# Patient Record
Sex: MALE | Race: BLACK OR AFRICAN AMERICAN | NOT HISPANIC OR LATINO | Employment: UNEMPLOYED | URBAN - METROPOLITAN AREA
[De-identification: names, ages, dates, MRNs, and addresses within clinical notes are randomized per-mention and may not be internally consistent; named-entity substitution may affect disease eponyms.]

---

## 2020-11-27 ENCOUNTER — HOSPITAL ENCOUNTER (OUTPATIENT)
Dept: TELEMEDICINE | Facility: HOSPITAL | Age: 60
Discharge: HOME | End: 2020-11-27

## 2020-11-27 DIAGNOSIS — I63.511 ACUTE ISCHEMIC RIGHT MCA STROKE: ICD-10-CM

## 2020-11-27 PROCEDURE — G0425 INPT/ED TELECONSULT30: HCPCS | Mod: GT,,, | Performed by: PSYCHIATRY & NEUROLOGY

## 2020-11-27 PROCEDURE — G0425 PR INPT TELEHEALTH CONSULT 30M: ICD-10-PCS | Mod: GT,,, | Performed by: PSYCHIATRY & NEUROLOGY

## 2020-11-27 NOTE — ASSESSMENT & PLAN NOTE
59 y/o man presenting with L facial and arm weakness.  Suspect subcoritcal stroke.  Recommend MRI brain/MRA head/neck.  Not in the window for tpa.    Antithrombotics for secondary stroke prevention: Antiplatelets: Aspirin: 81 mg daily  Clopidogrel: 300 mg loading dose x 1, now    Statins for secondary stroke prevention and hyperlipidemia, if present:   Statins: Atorvastatin- 80 mg daily    Aggressive risk factor modification: HTN     Rehab efforts: The patient has been evaluated by a stroke team provider and the therapy needs have been fully considered based off the presenting complaints and exam findings. The following therapy evaluations are needed: PT evaluate and treat, OT evaluate and treat, SLP evaluate and treat, PM&R evaluate for appropriate placement    Diagnostics ordered/pending: HgbA1C to assess blood glucose levels, Lipid Profile to assess cholesterol levels, MRA head to assess vasculature, MRA neck/arch to assess vasculature, MRI head without contrast to assess brain parenchyma, TTE to assess cardiac function/status     VTE prophylaxis: Heparin 5000 units SQ every 8 hours    BP parameters: Infarct: No intervention, SBP <220

## 2020-11-27 NOTE — HPI
59 y/o man with HTN, prior strokes, CAD who presents with L arm weakness.  Patient states symptoms started yesterday at 11 am.  They got worse early this morning.

## 2020-11-27 NOTE — CONSULTS
[facility]  Vascular Neurology  Comprehensive Stroke Center  Tele-Consultation Note      Consults    Consulting Provider: PATRICK BOND  Current Providers  No providers found    Patient Location: [facility] TELEMEDICINE ED [facility] Emergency Department  Spoke hospital nurse at bedside with patient assisting consultant.     Patient information was obtained from patient.         Assessment/Plan:     STROKE DOCUMENTATION     Acute Stroke Times:   Acute Stroke Times   Last Known Normal Date: 11/26/20  Last Known Normal Time: 1100  Stroke Team Called Date: 11/27/20  Stroke Team Called Time: 0454  Stroke Team Arrival Date: 11/27/20  Stroke Team Arrival Time: 0500  CT Interpretation Time: 0510    NIH Scale:  1a. Level of Consciousness: 0-->Alert, keenly responsive  1b. LOC Questions: 0-->Answers both questions correctly  1c. LOC Commands: 0-->Performs both tasks correctly  2. Best Gaze: 0-->Normal  3. Visual: 0-->No visual loss  4. Facial Palsy: 1-->Minor paralysis (flattened nasolabial fold, asymmetry on smiling)  5a. Motor Arm, Left: 1-->Drift, limb holds 90 (or 45) degrees, but drifts down before full 10 seconds, does not hit bed or other support  5b. Motor Arm, Right: 0-->No drift, limb holds 90 (or 45) degrees for full 10 secs  6a. Motor Leg, Left: 0-->No drift, leg holds 30 degree position for full 5 secs  6b. Motor Leg, Right: 0-->No drift, leg holds 30 degree position for full 5 secs  7. Limb Ataxia: 0-->Absent  8. Sensory: 1-->Mild-to-moderate sensory loss, patient feels pinprick is less sharp or is dull on the affected side, or there is a loss of superficial pain with pinprick, but patient is aware of being touched  9. Best Language: 0-->No aphasia, normal  10. Dysarthria: 2-->Severe dysarthria, patients speech is so slurred as to be unintelligible in the absence of or out of proportion to any dysphasia, or is mute/anarthric  11. Extinction and  Inattention (formerly Neglect): 0-->No abnormality  Total (NIH Stroke Scale): 5     Modified Montezuma    Eveline Coma Scale:    ABCD2 Score:    [id number] Score:   HAS -BLED Score:   ICH Score:   Hunt & Benítez Classification:       Diagnoses:   Acute ischemic right MCA stroke  59 y/o man presenting with L facial and arm weakness.  Suspect subcoritcal stroke.  Recommend MRI brain/MRA head/neck.  Not in the window for tpa.    Antithrombotics for secondary stroke prevention: Antiplatelets: Aspirin: 81 mg daily  Clopidogrel: 300 mg loading dose x 1, now    Statins for secondary stroke prevention and hyperlipidemia, if present:   Statins: Atorvastatin- 80 mg daily    Aggressive risk factor modification: HTN     Rehab efforts: The patient has been evaluated by a stroke team provider and the therapy needs have been fully considered based off the presenting complaints and exam findings. The following therapy evaluations are needed: PT evaluate and treat, OT evaluate and treat, SLP evaluate and treat, PM&R evaluate for appropriate placement    Diagnostics ordered/pending: HgbA1C to assess blood glucose levels, Lipid Profile to assess cholesterol levels, MRA head to assess vasculature, MRA neck/arch to assess vasculature, MRI head without contrast to assess brain parenchyma, TTE to assess cardiac function/status     VTE prophylaxis: Heparin 5000 units SQ every 8 hours    BP parameters: Infarct: No intervention, SBP <220            There were no vitals taken for this visit.  Alteplase Eligible?: No  Alteplase Recommendation: Alteplase not recommended due to Outside of treatment window   Possible Interventional Revascularization Candidate? low suspcion    Disposition Recommendation: admit to inpatient    Subjective:     History of Present Illness:  59 y/o man with HTN, prior strokes, CAD who presents with L arm weakness.  Patient states symptoms started yesterday at 11 am.  They got worse early this morning.      Woke up with  symptoms?: no    Recent bleeding noted: no  Does the patient take any Blood Thinners? no  Medications: Antiplatelets:  aspirin      Past Medical History: hypertension, MI/CAD and stroke    Past Surgical History: no major surgeries within the last 2 weeks    Family History: no relevant history    Social History: no smoking, no drinking, no drugs    Allergies: Allergies have not been reviewed No relevant allergies    Review of Systems   Constitutional: Negative for fever.   HENT: Negative for facial swelling.    Eyes: Negative for visual disturbance.   Respiratory: Negative for chest tightness and shortness of breath.    Cardiovascular: Negative for chest pain.   Genitourinary: Negative for dysuria.   Neurological: Positive for weakness and numbness.     Objective:   Vitals: There were no vitals taken for this visit. BP: 150/89    CT READ: Yes  No hemmorhage. No mass effect. No early infarct signs.  chronic b/l frontal strokes    Physical Exam  Constitutional:       General: He is not in acute distress.  HENT:      Head: Normocephalic and atraumatic.   Eyes:      Pupils: Pupils are equal, round, and reactive to light.   Pulmonary:      Effort: Pulmonary effort is normal.   Neurological:      Comments: MS: A&XO3, speech fluent, follows commands, no neglect  CN: PERRL, EOMI, sensation intact, L facial droop, severe dysarthria  Motor: L arm drift  Sens: decr sensation L arm  Coord: no ataxia on finger to nose                   Recommended the emergency room physician to have a brief discussion with the patient and/or family if available regarding the risks and benefits of treatment, and to briefly document the occurrence of that discussion in his clinical encounter note.     The attending portion of this evaluation, treatment, and documentation was performed per Babs Guadarrama MD via audiovisual.    Billing code:  (moderate to severe stroke, large areas of edema, some mimics)    · This patient has a critical  neurological condition/illness, with high morbidity and mortality.  · There is a high probability for acute neurological change leading to clinical and possibly life-threatening deterioration requiring highest level of physician preparedness for urgent intervention.  · Care was coordinated with other physicians involved in the patient's care.  · Radiologic studies and laboratory data were reviewed and interpreted, and plan of care was re-assessed based on the results.  · Diagnosis, treatment options and prognosis may have been discussed with the patient and/or family members or caregiver.  · Further advanced medical management and further evaluation is warranted for his care.      In your opinion, this was a: Tier 2 Van Negative    Consult End Time: 5:26 AM     Babs Guadarrama MD  Comprehensive Stroke Center  Vascular Neurology   [facility]

## 2020-11-27 NOTE — SUBJECTIVE & OBJECTIVE
Woke up with symptoms?: no    Recent bleeding noted: no  Does the patient take any Blood Thinners? no  Medications: Antiplatelets:  aspirin      Past Medical History: hypertension, MI/CAD and stroke    Past Surgical History: no major surgeries within the last 2 weeks    Family History: no relevant history    Social History: no smoking, no drinking, no drugs    Allergies: Allergies have not been reviewed No relevant allergies    Review of Systems   Constitutional: Negative for fever.   HENT: Negative for facial swelling.    Eyes: Negative for visual disturbance.   Respiratory: Negative for chest tightness and shortness of breath.    Cardiovascular: Negative for chest pain.   Genitourinary: Negative for dysuria.   Neurological: Positive for weakness and numbness.     Objective:   Vitals: There were no vitals taken for this visit. BP: 150/89    CT READ: Yes  No hemmorhage. No mass effect. No early infarct signs.  chronic b/l frontal strokes    Physical Exam  Constitutional:       General: He is not in acute distress.  HENT:      Head: Normocephalic and atraumatic.   Eyes:      Pupils: Pupils are equal, round, and reactive to light.   Pulmonary:      Effort: Pulmonary effort is normal.   Neurological:      Comments: MS: A&XO3, speech fluent, follows commands, no neglect  CN: PERRL, EOMI, sensation intact, L facial droop, severe dysarthria  Motor: L arm drift  Sens: decr sensation L arm  Coord: no ataxia on finger to nose

## 2023-01-07 ENCOUNTER — HOSPITAL ENCOUNTER (OUTPATIENT)
Facility: HOSPITAL | Age: 63
Discharge: HOME HEALTH | End: 2023-01-11
Attending: STUDENT IN AN ORGANIZED HEALTH CARE EDUCATION/TRAINING PROGRAM | Admitting: INTERNAL MEDICINE
Payer: MEDICAID

## 2023-01-07 DIAGNOSIS — R79.89 ELEVATED TROPONIN: Primary | ICD-10-CM

## 2023-01-07 DIAGNOSIS — I95.9 HYPOTENSION: ICD-10-CM

## 2023-01-07 DIAGNOSIS — R41.82 AMS (ALTERED MENTAL STATUS): ICD-10-CM

## 2023-01-07 DIAGNOSIS — R41.82 ALTERED MENTAL STATUS, UNSPECIFIED ALTERED MENTAL STATUS TYPE: ICD-10-CM

## 2023-01-07 LAB
ALBUMIN SERPL-MCNC: 3.4 G/DL (ref 3.4–4.8)
ALBUMIN/GLOB SERPL: 1.2 RATIO (ref 1.1–2)
ALP SERPL-CCNC: 67 UNIT/L (ref 40–150)
ALT SERPL-CCNC: 30 UNIT/L (ref 0–55)
AMPHET UR QL SCN: NEGATIVE
APPEARANCE UR: CLEAR
APTT PPP: 25.1 SECONDS (ref 23.2–33.7)
AST SERPL-CCNC: 28 UNIT/L (ref 5–34)
BACTERIA #/AREA URNS AUTO: NORMAL /HPF
BARBITURATE SCN PRESENT UR: NEGATIVE
BASOPHILS # BLD AUTO: 0.03 X10(3)/MCL (ref 0–0.2)
BASOPHILS NFR BLD AUTO: 0.4 %
BENZODIAZ UR QL SCN: NEGATIVE
BILIRUB UR QL STRIP.AUTO: NEGATIVE MG/DL
BILIRUBIN DIRECT+TOT PNL SERPL-MCNC: 0.3 MG/DL
BNP BLD-MCNC: 101.1 PG/ML
BUN SERPL-MCNC: 13.6 MG/DL (ref 8.4–25.7)
CALCIUM SERPL-MCNC: 9.2 MG/DL (ref 8.8–10)
CANNABINOIDS UR QL SCN: NEGATIVE
CHLORIDE SERPL-SCNC: 106 MMOL/L (ref 98–107)
CO2 SERPL-SCNC: 28 MMOL/L (ref 23–31)
COCAINE UR QL SCN: NEGATIVE
COLOR UR AUTO: YELLOW
CREAT SERPL-MCNC: 0.95 MG/DL (ref 0.73–1.18)
EOSINOPHIL # BLD AUTO: 0.02 X10(3)/MCL (ref 0–0.9)
EOSINOPHIL NFR BLD AUTO: 0.2 %
ERYTHROCYTE [DISTWIDTH] IN BLOOD BY AUTOMATED COUNT: 14.1 % (ref 11.6–14.4)
ETHANOL SERPL-MCNC: <10 MG/DL
FENTANYL UR QL SCN: NEGATIVE
FLUAV AG UPPER RESP QL IA.RAPID: NOT DETECTED
FLUBV AG UPPER RESP QL IA.RAPID: NOT DETECTED
GFR SERPLBLD CREATININE-BSD FMLA CKD-EPI: 90 MLS/MIN/1.73/M2
GLOBULIN SER-MCNC: 2.9 GM/DL (ref 2.4–3.5)
GLUCOSE SERPL-MCNC: 105 MG/DL (ref 82–115)
GLUCOSE UR QL STRIP.AUTO: NEGATIVE MG/DL
HCT VFR BLD AUTO: 46.9 % (ref 42–52)
HGB BLD-MCNC: 15.4 GM/DL (ref 14–18)
IMM GRANULOCYTES # BLD AUTO: 0.02 X10(3)/MCL (ref 0–0.04)
IMM GRANULOCYTES NFR BLD AUTO: 0.2 %
INR BLD: 0.98 (ref 0–1.3)
KETONES UR QL STRIP.AUTO: NEGATIVE MG/DL
LACTATE SERPL-SCNC: 1.6 MMOL/L (ref 0.5–2.2)
LEUKOCYTE ESTERASE UR QL STRIP.AUTO: NEGATIVE UNIT/L
LIPASE SERPL-CCNC: 27 U/L
LYMPHOCYTES # BLD AUTO: 1.2 X10(3)/MCL (ref 0.6–4.6)
LYMPHOCYTES NFR BLD AUTO: 14.9 %
MCH RBC QN AUTO: 29 PG
MCHC RBC AUTO-ENTMCNC: 32.8 MG/DL (ref 33–36)
MCV RBC AUTO: 88.3 FL (ref 80–94)
MDMA UR QL SCN: NEGATIVE
MONOCYTES # BLD AUTO: 0.68 X10(3)/MCL (ref 0.1–1.3)
MONOCYTES NFR BLD AUTO: 8.4 %
NEUTROPHILS # BLD AUTO: 6.1 X10(3)/MCL (ref 2.1–9.2)
NEUTROPHILS NFR BLD AUTO: 75.9 %
NITRITE UR QL STRIP.AUTO: NEGATIVE
NRBC BLD AUTO-RTO: 0 % (ref 0–1)
OPIATES UR QL SCN: NEGATIVE
PCP UR QL: NEGATIVE
PH UR STRIP.AUTO: 7 [PH]
PH UR: 7 [PH] (ref 3–11)
PLATELET # BLD AUTO: 179 X10(3)/MCL (ref 140–371)
PMV BLD AUTO: 10.9 FL (ref 9.4–12.4)
POTASSIUM SERPL-SCNC: 4.1 MMOL/L (ref 3.5–5.1)
PROT SERPL-MCNC: 6.3 GM/DL (ref 5.8–7.6)
PROT UR QL STRIP.AUTO: ABNORMAL MG/DL
PROTHROMBIN TIME: 12.9 SECONDS (ref 12.5–14.5)
RBC # BLD AUTO: 5.31 X10(6)/MCL (ref 4.7–6.1)
RBC #/AREA URNS AUTO: <5 /HPF
RBC UR QL AUTO: NEGATIVE UNIT/L
SARS-COV-2 RNA RESP QL NAA+PROBE: NOT DETECTED
SODIUM SERPL-SCNC: 141 MMOL/L (ref 136–145)
SP GR UR STRIP.AUTO: 1.01 (ref 1–1.03)
SQUAMOUS #/AREA URNS AUTO: <5 /HPF
TROPONIN I SERPL-MCNC: 0.05 NG/ML (ref 0–0.04)
UROBILINOGEN UR STRIP-ACNC: 0.2 MG/DL
WBC # SPEC AUTO: 8.1 X10(3)/MCL (ref 4.5–11.5)
WBC #/AREA URNS AUTO: <5 /HPF

## 2023-01-07 PROCEDURE — G0378 HOSPITAL OBSERVATION PER HR: HCPCS

## 2023-01-07 PROCEDURE — 96361 HYDRATE IV INFUSION ADD-ON: CPT

## 2023-01-07 PROCEDURE — 0240U COVID/FLU A&B PCR: CPT | Performed by: STUDENT IN AN ORGANIZED HEALTH CARE EDUCATION/TRAINING PROGRAM

## 2023-01-07 PROCEDURE — 93005 ELECTROCARDIOGRAM TRACING: CPT

## 2023-01-07 PROCEDURE — 93010 EKG 12-LEAD: ICD-10-PCS | Mod: ,,, | Performed by: INTERNAL MEDICINE

## 2023-01-07 PROCEDURE — 85610 PROTHROMBIN TIME: CPT | Performed by: STUDENT IN AN ORGANIZED HEALTH CARE EDUCATION/TRAINING PROGRAM

## 2023-01-07 PROCEDURE — 84484 ASSAY OF TROPONIN QUANT: CPT | Performed by: STUDENT IN AN ORGANIZED HEALTH CARE EDUCATION/TRAINING PROGRAM

## 2023-01-07 PROCEDURE — 99285 EMERGENCY DEPT VISIT HI MDM: CPT | Mod: 25

## 2023-01-07 PROCEDURE — 87040 BLOOD CULTURE FOR BACTERIA: CPT | Performed by: STUDENT IN AN ORGANIZED HEALTH CARE EDUCATION/TRAINING PROGRAM

## 2023-01-07 PROCEDURE — 80053 COMPREHEN METABOLIC PANEL: CPT | Performed by: STUDENT IN AN ORGANIZED HEALTH CARE EDUCATION/TRAINING PROGRAM

## 2023-01-07 PROCEDURE — 93010 ELECTROCARDIOGRAM REPORT: CPT | Mod: ,,, | Performed by: INTERNAL MEDICINE

## 2023-01-07 PROCEDURE — 83605 ASSAY OF LACTIC ACID: CPT | Performed by: STUDENT IN AN ORGANIZED HEALTH CARE EDUCATION/TRAINING PROGRAM

## 2023-01-07 PROCEDURE — 85025 COMPLETE CBC W/AUTO DIFF WBC: CPT | Performed by: STUDENT IN AN ORGANIZED HEALTH CARE EDUCATION/TRAINING PROGRAM

## 2023-01-07 PROCEDURE — 85730 THROMBOPLASTIN TIME PARTIAL: CPT | Performed by: STUDENT IN AN ORGANIZED HEALTH CARE EDUCATION/TRAINING PROGRAM

## 2023-01-07 PROCEDURE — 81001 URINALYSIS AUTO W/SCOPE: CPT | Performed by: STUDENT IN AN ORGANIZED HEALTH CARE EDUCATION/TRAINING PROGRAM

## 2023-01-07 PROCEDURE — 82077 ASSAY SPEC XCP UR&BREATH IA: CPT | Performed by: STUDENT IN AN ORGANIZED HEALTH CARE EDUCATION/TRAINING PROGRAM

## 2023-01-07 PROCEDURE — 25500020 PHARM REV CODE 255: Performed by: STUDENT IN AN ORGANIZED HEALTH CARE EDUCATION/TRAINING PROGRAM

## 2023-01-07 PROCEDURE — 96360 HYDRATION IV INFUSION INIT: CPT | Mod: 59

## 2023-01-07 PROCEDURE — 83690 ASSAY OF LIPASE: CPT | Performed by: STUDENT IN AN ORGANIZED HEALTH CARE EDUCATION/TRAINING PROGRAM

## 2023-01-07 PROCEDURE — 80307 DRUG TEST PRSMV CHEM ANLYZR: CPT | Performed by: STUDENT IN AN ORGANIZED HEALTH CARE EDUCATION/TRAINING PROGRAM

## 2023-01-07 PROCEDURE — 83880 ASSAY OF NATRIURETIC PEPTIDE: CPT | Performed by: STUDENT IN AN ORGANIZED HEALTH CARE EDUCATION/TRAINING PROGRAM

## 2023-01-07 PROCEDURE — 63600175 PHARM REV CODE 636 W HCPCS: Performed by: STUDENT IN AN ORGANIZED HEALTH CARE EDUCATION/TRAINING PROGRAM

## 2023-01-07 RX ORDER — CLOPIDOGREL BISULFATE 75 MG/1
75 TABLET ORAL DAILY
COMMUNITY

## 2023-01-07 RX ORDER — LISINOPRIL 2.5 MG/1
2.5 TABLET ORAL DAILY
Status: ON HOLD | COMMUNITY
End: 2023-01-11 | Stop reason: HOSPADM

## 2023-01-07 RX ORDER — ASPIRIN 81 MG/1
81 TABLET ORAL DAILY
COMMUNITY

## 2023-01-07 RX ORDER — ATORVASTATIN CALCIUM 10 MG/1
10 TABLET, FILM COATED ORAL DAILY
COMMUNITY

## 2023-01-07 RX ORDER — AMITRIPTYLINE HYDROCHLORIDE 50 MG/1
25 TABLET, FILM COATED ORAL NIGHTLY
Status: ON HOLD | COMMUNITY
End: 2023-04-28

## 2023-01-07 RX ORDER — SCOLOPAMINE TRANSDERMAL SYSTEM 1 MG/1
1 PATCH, EXTENDED RELEASE TRANSDERMAL
COMMUNITY

## 2023-01-07 RX ADMIN — SODIUM CHLORIDE, POTASSIUM CHLORIDE, SODIUM LACTATE AND CALCIUM CHLORIDE 1000 ML: 600; 310; 30; 20 INJECTION, SOLUTION INTRAVENOUS at 03:01

## 2023-01-07 RX ADMIN — SODIUM CHLORIDE, POTASSIUM CHLORIDE, SODIUM LACTATE AND CALCIUM CHLORIDE 1000 ML: 600; 310; 30; 20 INJECTION, SOLUTION INTRAVENOUS at 06:01

## 2023-01-07 RX ADMIN — IOPAMIDOL 100 ML: 755 INJECTION, SOLUTION INTRAVENOUS at 06:01

## 2023-01-07 NOTE — ED PROVIDER NOTES
Encounter Date: 1/7/2023    SCRIBE #1 NOTE: I, Garrick Tracy, am scribing for, and in the presence of,  Satya Chavis MD. I have scribed the following portions of the note - Other sections scribed: HPI, ROS, PE, EKG.     History     Chief Complaint   Patient presents with    Hypotension     Pt to ED with lethargy; EMS called by sister for pt with altered mental status. Hypotensive and bradycardic; initial GCS 10, given approx 700cc NS and GCS 14 currently.     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 Pt was hypotensive in route with EMS and received 700cc NS at that time. Pt reports that he saw his doctor on 01/04/2022. Pt is ambulatory at baseline.   Pt denies having any recent falls.     The history is provided by the patient and a relative.   General Illness   The current episode started today. The problem has been unchanged. The pain is at a severity of 0/10. Nothing relieves the symptoms. Nothing aggravates the symptoms. Pertinent negatives include no fever, no abdominal pain, no sore throat, no cough, no shortness of breath and no rash.       Review of patient's allergies indicates:  No Known Allergies  Past Medical History:   Diagnosis Date    Acid reflux     Cerebral aneurysm     Heart disease     Hypertension     Mixed hyperlipidemia      Past Surgical History:   Procedure Laterality Date    BACK SURGERY      CARDIAC SURGERY      KNEE SURGERY       Family History   Family history unknown: Yes     Social History     Tobacco Use    Smoking status: Every Day     Packs/day: 1.00     Types: Cigarettes    Smokeless tobacco: Never   Substance Use Topics    Alcohol use: Yes     Comment: 1/2 pint of whiskey every month    Drug use: Never     Review of Systems   Constitutional:  Negative for fever.        Lethargic   HENT:  Negative for sore throat.    Eyes:   Negative for visual disturbance.   Respiratory:  Negative for cough and shortness of breath.    Cardiovascular:  Negative for chest pain.   Gastrointestinal:  Negative for abdominal pain.   Genitourinary:  Negative for dysuria.   Musculoskeletal:  Negative for joint swelling.   Skin:  Negative for rash.   Neurological:  Positive for speech difficulty. Negative for weakness.        Altered mental status   Psychiatric/Behavioral:  Negative for confusion.    All other systems reviewed and are negative.    Physical Exam     Initial Vitals [01/07/23 1431]   BP Pulse Resp Temp SpO2   97/66 62 16 97.9 °F (36.6 °C) 99 %      MAP       --         Physical Exam    Nursing note and vitals reviewed.  Constitutional: He appears well-developed and well-nourished. He is not diaphoretic. No distress.   HENT:   Head: Normocephalic and atraumatic.   No abrasions, contusions, lacerations to the scalp or face.  No superior inferior orbital ridge tenderness to palpation.  No zygomatic arch tenderness to palpation.  No epistaxis.  No CSF rhinorrhea.  No septal hematoma.  No intraoral injuries noted.  Normal external ear.  No raccoon eyes.  No Garner sign.     Eyes: Conjunctivae and EOM are normal. Pupils are equal, round, and reactive to light.   Neck:   Normal range of motion.  Cardiovascular:  Normal rate, regular rhythm, normal heart sounds and intact distal pulses.           No murmur heard.  Pulmonary/Chest: Breath sounds normal. No respiratory distress. He has no rales.   Crackles bilaterally   Abdominal: Abdomen is soft. He exhibits no distension. There is no abdominal tenderness.   Musculoskeletal:         General: No tenderness or edema. Normal range of motion.      Cervical back: Normal range of motion.     Neurological: He is alert. No cranial nerve deficit. GCS eye subscore is 4. GCS verbal subscore is 3. GCS motor subscore is 6.   Pt is oriented to person/self only.   GCS: 13    Moving all extremities.  5/5 UE/LE strength.     Skin: Skin is warm and dry. Capillary refill takes less than 2 seconds. No rash noted. No erythema.       ED Course   Procedures  Labs Reviewed   B-TYPE NATRIURETIC PEPTIDE - Abnormal; Notable for the following components:       Result Value    Natriuretic Peptide 101.1 (*)     All other components within normal limits   TROPONIN I - Abnormal; Notable for the following components:    Troponin-I 0.048 (*)     All other components within normal limits   URINALYSIS, REFLEX TO URINE CULTURE - Abnormal; Notable for the following components:    Protein, UA Trace (*)     All other components within normal limits    Narrative:      URINE STABILITY IS 2 HOURS AT ROOM TEMP OR    SIX HOURS REFRIGERATED. PERFORMING TESTING ON    SPECIMENS GREATER THAN THIS AGE MAY AFFECT THE    FOLLOWING TESTS:    PH          SPECIFIC GRAVITY           BLOOD    CLARITY     BILIRUBIN               UROBILINOGEN   CBC WITH DIFFERENTIAL - Abnormal; Notable for the following components:    MCHC 32.8 (*)     All other components within normal limits   TROPONIN I - Abnormal; Notable for the following components:    Troponin-I 0.047 (*)     All other components within normal limits   TROPONIN I - Abnormal; Notable for the following components:    Troponin-I 0.048 (*)     All other components within normal limits   CBC WITH DIFFERENTIAL - Abnormal; Notable for the following components:    MCHC 32.3 (*)     All other components within normal limits   COVID/FLU A&B PCR - Normal    Narrative:     The Xpert Xpress SARS-CoV-2/FLU/RSV plus is a rapid, multiplexed real-time PCR test intended for the simultaneous qualitative detection and differentiation of SARS-CoV-2, Influenza A, Influenza B, and respiratory syncytial virus (RSV) viral RNA in either nasopharyngeal swab or nasal swab specimens.         APTT - Normal   PROTIME-INR - Normal   LIPASE - Normal   LACTIC ACID, PLASMA - Normal   URINALYSIS, MICROSCOPIC - Normal   DRUG SCREEN, URINE (BEAKER) -  Normal    Narrative:     Cut off concentrations:    Amphetamines - 1000 ng/ml  Barbiturates - 200 ng/ml  Benzodiazepine - 200 ng/ml  Cannabinoids (THC) - 50 ng/ml  Cocaine - 300 ng/ml  Fentanyl - 1.0 ng/ml  MDMA - 500 ng/ml  Opiates - 300 ng/ml   Phencyclidine (PCP) - 25 ng/ml    Specimen submitted for drug analysis and tested for pH and specific gravity in order to evaluate sample integrity. Suspect tampering if specific gravity is <1.003 and/or pH is not within the range of 4.5 - 8.0  False negatives may result form substances such as bleach added to urine.  False positives may result for the presence of a substance with similar chemical structure to the drug or its metabolite.    This test provides only a PRELIMINARY analytical test result. A more specific alternate chemical method must be used in order to obtain a confirmed analytical result. Gas chromatography/mass spectrometry (GC/MS) is the preferred confirmatory method. Other chemical confirmation methods are available. Clinical consideration and professional judgement should be applied to any drug of abuse test result, particularly when preliminary positive results are used.    Positive results will be confirmed only at the physicians request. Unconfirmed screening results are to be used only for medical purposes (treatment).        ALCOHOL,MEDICAL (ETHANOL) - Normal   CBC W/ AUTO DIFFERENTIAL    Narrative:     The following orders were created for panel order CBC auto differential.  Procedure                               Abnormality         Status                     ---------                               -----------         ------                     CBC with Differential[[id number]]        Abnormal            Final result                 Please view results for these tests on the individual orders.   COMPREHENSIVE METABOLIC PANEL   CBC W/ AUTO DIFFERENTIAL    Narrative:     The following orders were created for panel order CBC auto  differential.  Procedure                               Abnormality         Status                     ---------                               -----------         ------                     CBC with Differential[[id number]]        Abnormal            Final result                 Please view results for these tests on the individual orders.   COMPREHENSIVE METABOLIC PANEL     EKG Readings: (Independently Interpreted)   Initial Reading: No STEMI. Rhythm: Normal Sinus Rhythm. Heart Rate: 67. Ectopy: No Ectopy. Conduction: 1st Degree AV Block. ST Segments: Normal ST Segments. T Waves: Normal. Axis: Right Axis Deviation.   Time: 1427   ECG Results              EKG 12-lead (Final result)  Result time 01/07/23 22:14:09      Final result by Interface, Lab In [facility] (01/07/23 22:14:09)                   Narrative:    Test Reason : I95.9,    Vent. Rate : 067 BPM     Atrial Rate : 067 BPM     P-R Int : 214 ms          QRS Dur : 094 ms      QT Int : 426 ms       P-R-T Axes : 071 096 -89 degrees     QTc Int : 450 ms    Sinus rhythm with 1st degree A-V block  Rightward axis  Inferior infarct ,age undetermined  Anterior infarct ,age undetermined  Abnormal ECG  No previous ECGs available  Confirmed by Eliezer Penaloza MD ([id number]) on 1/7/2023 10:14:00 PM    Referred By: [email or web address]   SELF           Confirmed By:Eliezer Penaloza MD                                  Imaging Results              CTA Head and Neck (xpd) (Final result)  Result time 01/07/23 18:58:37   Procedure changed from CTA Brain     Final result by Nader Jain MD (01/07/23 18:58:37)                   Impression:      1. No high-grade arterial stenosis or occlusion in the head or neck.  2. Left anterior cerebral artery aneurysm measuring up to 6 mm.      Electronically signed by: Nader Jain  Date:    01/07/2023  Time:    18:58               Narrative:    EXAMINATION:  CTA HEAD AND NECK (XPD)    CLINICAL HISTORY:  Cerebral aneurysm, follow-up;    TECHNIQUE:  Helical acquisition  through the head and neck without and with IV contrast targeting the arterial phase. 3D MIP reconstructions made available for review. DLP 1926 mGycm. Automatic exposure control, adjustment of mA/kV or iterative reconstruction technique was used to reduce radiation.  NASCET criteria utilized.    COMPARISON:  No priors    FINDINGS:  Three-vessel aortic arch.  Some streak artifact obscures the proximal left common carotid and vertebral arteries.  Visualized portions of the common carotid arteries are widely patent.  There is some atherosclerotic plaque near the carotid bulbs but only mild narrowing here.  There is dense atherosclerotic plaque along the cavernous portions of the internal carotid arteries with moderate narrowing.  The cervical vertebral arteries are widely patent.    There is no high-grade intracranial stenosis or occlusion.  There is left anterior cerebral artery aneurysm measuring up to 6 mm on image 255 series 5.                                       CT Head Without Contrast (Final result)  Result time 01/07/23 17:06:51      Final result by Nader Jain MD (01/07/23 17:06:51)                   Impression:      1. No acute cortical infarct, hemorrhage or mass lesion.  2. Small focus of hyperattenuation in the expected area of the left anterior cerebral artery, question aneurysm.      Electronically signed by: Nader Jain  Date:    01/07/2023  Time:    17:06               Narrative:    EXAMINATION:  CT HEAD WITHOUT CONTRAST    CLINICAL HISTORY:  Mental status change, unknown cause;    TECHNIQUE:  CT imaging of the head performed from the skull base to the vertex without intravenous contrast.  mGycm. Automatic exposure control, adjustment of mA/kV or iterative reconstruction technique was used to reduce radiation.    COMPARISON:  7 November 2013    FINDINGS:  There is no acute cortical infarct, hemorrhage or mass lesion.  Bilateral areas of encephalomalacia are unchanged.  There is some ex  vacuo dilatation of the ventricles.  There is a focus of hyperattenuation near the expected area of the left anterior cerebral artery on image 17 series 2 measuring 6 mm.  This was not clearly seen on prior.    Visualized paranasal sinuses and mastoid air cells are clear.                                       X-Ray Chest AP Portable (Final result)  Result time 01/07/23 15:43:56      Final result by Arnulfo Yadav MD (01/07/23 15:43:56)                   Impression:      No acute cardiopulmonary process.      Electronically signed by: Arnulfo Yadav MD  Date:    01/07/2023  Time:    15:43               Narrative:    EXAMINATION:  XR CHEST AP PORTABLE    CLINICAL HISTORY:  hypotension;    TECHNIQUE:  Single frontal view of the chest was performed.    COMPARISON:  12/09/2013    FINDINGS:  Left chest wall AICD is identified.  Heart size enlarged the pulmonary vasculature is normal.  Postsurgical changes of CABG.    Coarse interstitial markings lungs.  No evidence for effusion.                                    X-Rays:   Independently Interpreted Readings:   Chest X-Ray: Pacemaker present.  No infiltrate   Medications   lactated ringers bolus 1,000 mL (0 mLs Intravenous Stopped 1/7/23 1615)   lactated ringers bolus 1,000 mL (0 mLs Intravenous Stopped 1/7/23 1916)   iopamidoL (ISOVUE-370) injection 100 mL (100 mLs Intravenous Given 1/7/23 1850)     Medical Decision Making:   History:   I obtained history from: EMS provider.       <> Summary of History: Collateral history obtained from EMS stating that the patient was hypotensive in route and received 700cc NS in route to ED.   Old Medical Records: I decided to obtain old medical records.  Old Records Summarized: records from clinic visits.  Differential Diagnosis:   Sepsis, Dehydration, MAMIE, CVA, ICH, Medication reaction, polypharmacy  Independently Interpreted Test(s):   I have ordered and independently interpreted EKG Reading(s) - see prior notes  Clinical Tests:   Lab  Tests: Ordered and Reviewed  Radiological Study: Ordered and Reviewed  Medical Tests: Ordered and Reviewed  ED Management:  Patient is a 62-year-old male who presents to the emergency department for altered mental status, confusion.  Patient reportedly lethargic at home.  EMS reports GCS of 10.  On arrival patient GCS of 13.  Labs and imaging obtained due to hypotension.  Patient given IV fluids with improvement in blood pressure.  No obvious sources of infection identified.  CT of the head without any acute abnormalities.  CTA with incidental aneurysm noted.  Discussed with neurology who recommends follow-up with IR.  Collateral history obtained from family.  Patient observed in the emergency department for several hours with improvement in mental status. Placed in observation for altered mental status, suspect maybe polypharmacy.  Discussed with medicine.  Results discussed with patient and family.  Answered all questions at this time.     MDM    Medical Decision Making  Problems Addressed:  Altered mental status, unspecified altered mental status type: acute illness or injury that poses a threat to life or bodily functions  Elevated troponin: acute illness or injury  Hypotension: acute illness or injury that poses a threat to life or bodily functions    Amount and/or Complexity of Data Reviewed  Independent Historian: caregiver and EMS  External Data Reviewed: labs, radiology and ECG.  Labs: ordered.  Radiology: ordered and independent interpretation performed.  ECG/medicine tests: ordered and independent interpretation performed.      Amount and/or Complexity of Data Reviewed    Co-morbidities and/or factors adding to the complexity or risk for the patient?: Yes  Differential diagnoses: as noted above  Decision to obtain previous or outside records?: Yes  Chart Review (nursing home, outside records, CareEverywhere): Yes  Review of RX medications/new RX prescribed by me?: No  My EKG Interpretation: see  above  Labs/imaging/other tests obtained/considered (risk/benefits of testing discussed): Yes  Labs/tests intepretation: Minimally elevated troponin  My independent imaging interpretation: Yes  Treatment/interventions, IV fluids, IV medications: Yes, IV fluids  Complex management (IV controlled substances, went to OR, DNR, meds requiring monitoring, transfer, etc)?: Admission  Workup/treatment affected by social determinants of health?:No  Point of care US done/interpretation: No  Consults/radiologist/EMS/social work/family discussion/alternate history: Yes  Advanced care planning/end of life discussion: No  Shared decision making: Yes  ETOH/smoking/drug cessation discussion: no  Dispo: Observation      Critical Care  none              Scribe Attestation:   Scribe #1: I performed the above scribed service and the documentation accurately describes the services I performed. I attest to the accuracy of the note.    Attending Attestation:           Physician Attestation for Scribe:  Physician Attestation Statement for Scribe #1: I, Satya Chavis MD, reviewed documentation, as scribed by Garrick Tracy in my presence, and it is both accurate and complete.           ED Course as of 01/27/23 0913   Sat Jan 07, 2023   1831 Discussed with Dr. Foss.  [RP]   2019 Discussed with Dr. Foss.  Recommends outpatient follow up with interventional neurology.  [RP]      ED Course User Index  [RP] Satya Chavis MD                 Clinical Impression:   Final diagnoses:  [I95.9] Hypotension  [R77.8] Elevated troponin (Primary)  [R41.82] Altered mental status, unspecified altered mental status type  [R41.82] AMS (altered mental status)        ED Disposition Condition    Observation                 Satya Chavis MD  01/27/23 0917

## 2023-01-08 PROBLEM — R41.82 AMS (ALTERED MENTAL STATUS): Status: ACTIVE | Noted: 2023-01-08

## 2023-01-08 LAB
ALBUMIN SERPL-MCNC: 3.6 G/DL (ref 3.4–4.8)
ALBUMIN/GLOB SERPL: 1.2 RATIO (ref 1.1–2)
ALP SERPL-CCNC: 72 UNIT/L (ref 40–150)
ALT SERPL-CCNC: 29 UNIT/L (ref 0–55)
AST SERPL-CCNC: 29 UNIT/L (ref 5–34)
BASOPHILS # BLD AUTO: 0.03 X10(3)/MCL (ref 0–0.2)
BASOPHILS NFR BLD AUTO: 0.6 %
BILIRUBIN DIRECT+TOT PNL SERPL-MCNC: 0.7 MG/DL
BUN SERPL-MCNC: 10 MG/DL (ref 8.4–25.7)
CALCIUM SERPL-MCNC: 9.3 MG/DL (ref 8.8–10)
CHLORIDE SERPL-SCNC: 105 MMOL/L (ref 98–107)
CO2 SERPL-SCNC: 27 MMOL/L (ref 23–31)
CREAT SERPL-MCNC: 0.84 MG/DL (ref 0.73–1.18)
EOSINOPHIL # BLD AUTO: 0.04 X10(3)/MCL (ref 0–0.9)
EOSINOPHIL NFR BLD AUTO: 0.7 %
ERYTHROCYTE [DISTWIDTH] IN BLOOD BY AUTOMATED COUNT: 14.1 % (ref 11.6–14.4)
GFR SERPLBLD CREATININE-BSD FMLA CKD-EPI: >90 MLS/MIN/1.73/M2
GLOBULIN SER-MCNC: 3 GM/DL (ref 2.4–3.5)
GLUCOSE SERPL-MCNC: 87 MG/DL (ref 82–115)
HCT VFR BLD AUTO: 49.2 % (ref 42–52)
HGB BLD-MCNC: 15.9 GM/DL (ref 14–18)
IMM GRANULOCYTES # BLD AUTO: 0.01 X10(3)/MCL (ref 0–0.04)
IMM GRANULOCYTES NFR BLD AUTO: 0.2 %
LYMPHOCYTES # BLD AUTO: 1.69 X10(3)/MCL (ref 0.6–4.6)
LYMPHOCYTES NFR BLD AUTO: 31.5 %
MCH RBC QN AUTO: 28.6 PG
MCHC RBC AUTO-ENTMCNC: 32.3 MG/DL (ref 33–36)
MCV RBC AUTO: 88.5 FL (ref 80–94)
MONOCYTES # BLD AUTO: 0.49 X10(3)/MCL (ref 0.1–1.3)
MONOCYTES NFR BLD AUTO: 9.1 %
NEUTROPHILS # BLD AUTO: 3.1 X10(3)/MCL (ref 2.1–9.2)
NEUTROPHILS NFR BLD AUTO: 57.9 %
NRBC BLD AUTO-RTO: 0 % (ref 0–1)
PLATELET # BLD AUTO: 181 X10(3)/MCL (ref 140–371)
PMV BLD AUTO: 11 FL (ref 9.4–12.4)
POTASSIUM SERPL-SCNC: 4.6 MMOL/L (ref 3.5–5.1)
PROT SERPL-MCNC: 6.6 GM/DL (ref 5.8–7.6)
RBC # BLD AUTO: 5.56 X10(6)/MCL (ref 4.7–6.1)
SODIUM SERPL-SCNC: 140 MMOL/L (ref 136–145)
TROPONIN I SERPL-MCNC: 0.05 NG/ML (ref 0–0.04)
TROPONIN I SERPL-MCNC: 0.05 NG/ML (ref 0–0.04)
WBC # SPEC AUTO: 5.4 X10(3)/MCL (ref 4.5–11.5)

## 2023-01-08 PROCEDURE — G0378 HOSPITAL OBSERVATION PER HR: HCPCS

## 2023-01-08 PROCEDURE — 85025 COMPLETE CBC W/AUTO DIFF WBC: CPT | Performed by: INTERNAL MEDICINE

## 2023-01-08 PROCEDURE — 84484 ASSAY OF TROPONIN QUANT: CPT | Performed by: INTERNAL MEDICINE

## 2023-01-08 PROCEDURE — 92610 EVALUATE SWALLOWING FUNCTION: CPT

## 2023-01-08 PROCEDURE — 25000003 PHARM REV CODE 250: Performed by: INTERNAL MEDICINE

## 2023-01-08 PROCEDURE — 63600175 PHARM REV CODE 636 W HCPCS: Performed by: INTERNAL MEDICINE

## 2023-01-08 PROCEDURE — 80053 COMPREHEN METABOLIC PANEL: CPT | Performed by: INTERNAL MEDICINE

## 2023-01-08 PROCEDURE — 96372 THER/PROPH/DIAG INJ SC/IM: CPT | Performed by: INTERNAL MEDICINE

## 2023-01-08 RX ORDER — ASPIRIN 81 MG/1
81 TABLET ORAL DAILY
Status: DISCONTINUED | OUTPATIENT
Start: 2023-01-08 | End: 2023-01-11 | Stop reason: HOSPADM

## 2023-01-08 RX ORDER — LISINOPRIL 2.5 MG/1
2.5 TABLET ORAL DAILY
Status: DISCONTINUED | OUTPATIENT
Start: 2023-01-08 | End: 2023-01-09

## 2023-01-08 RX ORDER — ATORVASTATIN CALCIUM 10 MG/1
10 TABLET, FILM COATED ORAL DAILY
Status: DISCONTINUED | OUTPATIENT
Start: 2023-01-08 | End: 2023-01-11 | Stop reason: HOSPADM

## 2023-01-08 RX ORDER — SODIUM CHLORIDE 0.9 % (FLUSH) 0.9 %
10 SYRINGE (ML) INJECTION
Status: DISCONTINUED | OUTPATIENT
Start: 2023-01-08 | End: 2023-01-11 | Stop reason: HOSPADM

## 2023-01-08 RX ORDER — CLOPIDOGREL BISULFATE 75 MG/1
75 TABLET ORAL DAILY
Status: DISCONTINUED | OUTPATIENT
Start: 2023-01-08 | End: 2023-01-11 | Stop reason: HOSPADM

## 2023-01-08 RX ORDER — ENOXAPARIN SODIUM 100 MG/ML
40 INJECTION SUBCUTANEOUS EVERY 24 HOURS
Status: DISCONTINUED | OUTPATIENT
Start: 2023-01-08 | End: 2023-01-11 | Stop reason: HOSPADM

## 2023-01-08 RX ORDER — TALC
6 POWDER (GRAM) TOPICAL NIGHTLY PRN
Status: DISCONTINUED | OUTPATIENT
Start: 2023-01-08 | End: 2023-01-11 | Stop reason: HOSPADM

## 2023-01-08 RX ADMIN — LISINOPRIL 2.5 MG: 2.5 TABLET ORAL at 09:01

## 2023-01-08 RX ADMIN — CLOPIDOGREL BISULFATE 75 MG: 75 TABLET ORAL at 09:01

## 2023-01-08 RX ADMIN — ATORVASTATIN CALCIUM 10 MG: 10 TABLET, FILM COATED ORAL at 09:01

## 2023-01-08 RX ADMIN — ASPIRIN 81 MG: 81 TABLET, COATED ORAL at 09:01

## 2023-01-08 RX ADMIN — ENOXAPARIN SODIUM 40 MG: 40 INJECTION SUBCUTANEOUS at 05:01

## 2023-01-08 NOTE — PROGRESS NOTES
[facility] Hospital Medicine Progress Note        Chief Complaint: Inpatient Follow-up for     Subjective:  Patient is a 62-year-old male with a past medical history of CAD/MI, CVA with residual left-sided weakness, hypertension who was brought to the ER because family members found him lethargic.  He was confused and hypotensive on arrival and was given normal saline with improvement in his blood pressure.  Lab work was completely unremarkable except for a very minimally elevated troponin of 0.048.  EKG showed no acute ST elevations or depressions.  CT head was unremarkable and CTA of the head and neck noted a 6 mm cerebral aneurysm for which Interventional Neurology recommended outpatient follow-up.  At the time of my evaluation patient was drowsy but awake and alert and oriented x4 and report he took his sleepy meds this morning because he stayed up all night.    Seen and examined the patient.  Afebrile vitals stable hemodynamics stable.    Presenting with hypotension and improved after IV fluids.     Objective/physical exam:  GENERAL: awake, alert, oriented and in no acute distress, non-toxic appearing   HEENT: normocephalic atraumatic   LUNGS: Clear bilaterally, no wheezing or rales, no accessory muscle use   CVS: Regular rate and rhythm, normal peripheral perfusion  ABD: Soft, non-tender, non-distended, bowel sounds present  EXTREMITIES: no clubbing or cyanosis  NEURO: alert and oriented, mild LUE and LLE weakness                VITAL SIGNS: 24 HRS MIN & MAX LAST   Temp  Min: 97.9 °F (36.6 °C)  Max: 98.2 °F (36.8 °C) 98.2 °F (36.8 °C)   BP  Min: 90/55  Max: 143/83 134/76   Pulse  Min: 56  Max: 98  75   Resp  Min: 11  Max: 20 13   SpO2  Min: 95 %  Max: 99 % 95 %       Labs, Microbiology and Imaging were Reviewed.      Microbiology Results (last 7 days)       Procedure Component Value Units Date/Time    Blood culture #2 **CANNOT BE ORDERED STAT** [[id number]] Collected: 01/07/23  1510    Order Status: Resulted Specimen: Blood Updated: 01/07/23 1555    Blood culture #1 **CANNOT BE ORDERED STAT** [[id number]] Collected: 01/07/23 1510    Order Status: Resulted Specimen: Blood Updated: 01/07/23 1553               Medications   aspirin  81 mg Oral Daily    atorvastatin  10 mg Oral Daily    clopidogreL  75 mg Oral Daily    enoxaparin  40 mg Subcutaneous Daily    lisinopriL  2.5 mg Oral Daily        melatonin, sodium chloride 0.9%     Radiology:  CTA Head and Neck (xpd)  Narrative: EXAMINATION:  CTA HEAD AND NECK (XPD)    CLINICAL HISTORY:  Cerebral aneurysm, follow-up;    TECHNIQUE:  Helical acquisition through the head and neck without and with IV contrast targeting the arterial phase. 3D MIP reconstructions made available for review. DLP 1926 mGycm. Automatic exposure control, adjustment of mA/kV or iterative reconstruction technique was used to reduce radiation.  NASCET criteria utilized.    COMPARISON:  No priors    FINDINGS:  Three-vessel aortic arch.  Some streak artifact obscures the proximal left common carotid and vertebral arteries.  Visualized portions of the common carotid arteries are widely patent.  There is some atherosclerotic plaque near the carotid bulbs but only mild narrowing here.  There is dense atherosclerotic plaque along the cavernous portions of the internal carotid arteries with moderate narrowing.  The cervical vertebral arteries are widely patent.    There is no high-grade intracranial stenosis or occlusion.  There is left anterior cerebral artery aneurysm measuring up to 6 mm on image 255 series 5.  Impression: 1. No high-grade arterial stenosis or occlusion in the head or neck.  2. Left anterior cerebral artery aneurysm measuring up to 6 mm.    Electronically signed by: Nader Jain  Date:    01/07/2023  Time:    18:58  CT Head Without Contrast  Narrative: EXAMINATION:  CT HEAD WITHOUT CONTRAST    CLINICAL HISTORY:  Mental status change, unknown  cause;    TECHNIQUE:  CT imaging of the head performed from the skull base to the vertex without intravenous contrast.  mGycm. Automatic exposure control, adjustment of mA/kV or iterative reconstruction technique was used to reduce radiation.    COMPARISON:  7 November 2013    FINDINGS:  There is no acute cortical infarct, hemorrhage or mass lesion.  Bilateral areas of encephalomalacia are unchanged.  There is some ex vacuo dilatation of the ventricles.  There is a focus of hyperattenuation near the expected area of the left anterior cerebral artery on image 17 series 2 measuring 6 mm.  This was not clearly seen on prior.    Visualized paranasal sinuses and mastoid air cells are clear.  Impression: 1. No acute cortical infarct, hemorrhage or mass lesion.  2. Small focus of hyperattenuation in the expected area of the left anterior cerebral artery, question aneurysm.    Electronically signed by: Nader Jain  Date:    01/07/2023  Time:    17:06  X-Ray Chest AP Portable  Narrative: EXAMINATION:  XR CHEST AP PORTABLE    CLINICAL HISTORY:  hypotension;    TECHNIQUE:  Single frontal view of the chest was performed.    COMPARISON:  12/09/2013    FINDINGS:  Left chest wall AICD is identified.  Heart size enlarged the pulmonary vasculature is normal.  Postsurgical changes of CABG.    Coarse interstitial markings lungs.  No evidence for effusion.  Impression: No acute cardiopulmonary process.    Electronically signed by: Arnulfo Yadav MD  Date:    01/07/2023  Time:    15:43          Assessment/Plan:  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  Hx CAD/CABG, multiple CVAs w/ resilual left sided weakness, HTN, HLD      - troponins are stable no need for cardiology consult.    -6 mm aneurysm in the CTA.  He will need follow-up with Interventional Neurology as outpatient.    -monitor his  blood pressure keep him overnight and everything results by tomorrow blood pressure is better and no issues consider discharging.    All diagnosis and differential diagnosis have been reviewed; assessment and plan has been documented; I have personally reviewed the labs and test results that are presently available; I have reviewed the patients medication list; I have reviewed the consulting providers response and recommendations. I have reviewed or attempted to review medical records based upon their availability.       Tony Eckert MD   01/08/2023

## 2023-01-08 NOTE — ED NOTES
Assumed care of pt. At this time.. pt. Sitting up in bed attempting to eat.. informed pt. He is npo at this time.. pt. Verbalized he will be released today.. denies other needs.. will continue to monitor

## 2023-01-08 NOTE — H&P
[facility] Hospital Medicine History & Physical Examination       Patient Name: Marion Mccrary  MRN: [id number]  Patient Class: OP- Observation   Admission Date: 1/7/2023  2:32 PM  Length of Stay: 0  Admitting Service: Hospital Medicine   Attending Physician: Manuel Guzman MD   Primary Care Provider: TERESSA Andrews MD  History source: EMR, patient and/or patient's family    CHIEF COMPLAINT   Hypotension and lethargy     HISTORY OF PRESENT ILLNESS:   Patient is a 62-year-old male with a past medical history of CAD/MI, CVA with residual left-sided weakness, hypertension who was brought to the ER because family members found him lethargic.  He was confused and hypotensive on arrival and was given normal saline with improvement in his blood pressure.  Lab work was completely unremarkable except for a very minimally elevated troponin of 0.048.  EKG showed no acute ST elevations or depressions.  CT head was unremarkable and CTA of the head and neck noted a 6 mm cerebral aneurysm for which Interventional Neurology recommended outpatient follow-up.  At the time of my evaluation patient was drowsy but awake and alert and oriented x4 and report he took his sleepy meds this morning because he stayed up all night.    PAST MEDICAL HISTORY:   History of CVA with mild residual left-sided weakness   History of coronary artery disease   Hypertension  Insomnia    PAST SURGICAL HISTORY:   History reviewed. No pertinent surgical history.    ALLERGIES:   Patient has no known allergies.    FAMILY HISTORY:   Reviewed and non-contributory     SOCIAL HISTORY:     Social History     Tobacco Use    Smoking status: Not on file    Smokeless tobacco: Not on file   Substance Use Topics    Alcohol use: Not on file        HOME MEDICATIONS:     amitriptyline (ELAVIL) 50 MG tablet Take 25 mg by mouth every evening.   aspirin (ECOTRIN) 81 MG EC tablet Take 81 mg by mouth once daily.   atorvastatin (LIPITOR)  10 MG tablet Take 10 mg by mouth once daily.   clopidogreL (PLAVIX) 75 mg tablet Take 75 mg by mouth once daily.   lisinopriL (PRINIVIL,ZESTRIL) 2.5 MG tablet Take 2.5 mg by mouth once daily.   sacubitriL-valsartan (ENTRESTO) 24-26 mg per tablet Take 1 tablet by mouth 2 (two) times daily.   scopolamine (TRANSDERM-SCOP) 1.3-1.5 mg (1 mg over 3 days) Place 1 patch onto the skin every 72 hours.       REVIEW OF SYSTEMS:   Except as documented, all other systems reviewed and negative     PHYSICAL EXAM:   T 97.9 °F (36.6 °C)   /76   P (!) 58   RR 20   O2 98 %  GENERAL: awake, alert, oriented and in no acute distress, non-toxic appearing   HEENT: normocephalic atraumatic   NECK: supple   LUNGS: Clear bilaterally, no wheezing or rales, no accessory muscle use   CVS: Regular rate and rhythm, normal peripheral perfusion  ABD: Soft, non-tender, non-distended, bowel sounds present  EXTREMITIES: no clubbing or cyanosis  SKIN: Warm, dry.   NEURO: alert and oriented, mild LUE and LLE weakness   PSYCHIATRIC: Cooperative    LABS AND IMAGING:     Recent Labs     01/07/23  1510   WBC 8.1   RBC 5.31   HGB 15.4   HCT 46.9   MCV 88.3   MCH 29.0   MCHC 32.8*   RDW 14.1        Recent Labs     01/07/23  1510   LACTIC 1.6     Recent Labs     01/07/23  1510   INR 0.98     No results for input(s): HGBA1C, CHOL, TRIG, LDL, VLDL, HDL in the last 72 hours.   Recent Labs     01/07/23  1510      K 4.1   CHLORIDE 106   CO2 28   BUN 13.6   CREATININE 0.95   GLUCOSE 105   CALCIUM 9.2   ALBUMIN 3.4   GLOBULIN 2.9   ALKPHOS 67   ALT 30   AST 28   BILITOT 0.3   LIPASE 27     Recent Labs     01/07/23  1510   .1*   TROPONINI 0.048*          CTA Head and Neck (xpd)  Impression: 1. No high-grade arterial stenosis or occlusion in the head or neck.  2. Left anterior cerebral artery aneurysm measuring up to 6 mm.  Electronically signed by: Nader Jain  Date:    01/07/2023  Time:    18:58    CT Head Without Contrast  Impression: 1. No  acute cortical infarct, hemorrhage or mass lesion.  2. Small focus of hyperattenuation in the expected area of the left anterior cerebral artery, question aneurysm.  Electronically signed by: Nader Jain  Date:    01/07/2023  Time:    17:06    X-Ray Chest AP Portable  Impression: No acute cardiopulmonary process.  Electronically signed by: Arnulfo Yadav MD  Date:    01/07/2023  Time:    15:43      ASSESSMENT & PLAN:   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  Hx CAD/CABG, multiple CVAs w/ resilual left sided weakness, HTN, HLD     - trend troponins, if uptrend contact [facility]   - hold sedating meds  - outpatient f/u for 6mm cerebral aneurysm   - resume home meds as appropriate     DVT prophylaxis: lovenox  Code status:     If patient was admitted under observational status it is with my approval/permission.     At least 55 min was spent on this history and physical.  Time seen: 10PM   Manuel Guzman MD

## 2023-01-08 NOTE — PT/OT/SLP EVAL
"Speech Language Pathology Department  Clinical Swallow Evaluation    Patient Name:  Marino Mccrary   MRN:  [id number]  Admitting Diagnosis: AMS (altered mental status)    Recommendations:     General recommendations:  SLP follow up x1  Diet recommendations:  Easy to Chew Diet - IDDSI Level 7, Liquid Diet Level: Thin liquids - IDDSI Level 0   Swallow strategies/precautions: small bites/sips, slow rate, Assist with feeding as needed, and medications whole in puree  Precautions: Standard, aspiration, aphasia    History:     History reviewed. No pertinent past medical history.    History reviewed. No pertinent surgical history.    Home Diet: Regular and thin liquids  Current Method of Nutrition: NPO    Subjective     Patient awake, alert, and cooperative.    Patient goals: "to go home"      Spiritual/Cultural/Jainism Beliefs/Practices that affect care: no    Pain/Comfort: Pain Rating 1: 0/10    Respiratory Status: room air     Objective:     Oral Musculature Evaluation  Oral Musculature: general weakness  Dentition: uses dentures to eat, other (see comments) (dentures not present)  Secretion Management: adequate  Mucosal Quality: good  Mandibular Strength and Mobility: WFL    Consistency Fed By Oral Symptoms Pharyngeal Symptoms   Thin liquid by cup Self None None   Thin liquid by straw Self None None   Puree SLP None None   Chewable solid SLP None None     Assessment:     Pt presents with no overt signs/sx of aspiration during PO intake. SLP educated pt on importance of posture during PO intake as pt indicated he "eats while lying down". SLP downgraded pt's solids to easy to chew secondary to dentures not present during hospital admission. SLP to follow up x1 regarding diet tolerance.     Patient Education:     Patient provided with verbal education regarding plan of care, signs/sx of aspiration, compensatory meal strategies to reduce risk of aspiration.  Understanding was verbalized.    Plan:     SLP Follow-Up:  Yes "   Plan of Care reviewed with:  patient      Time Tracking:     SLP Treatment Date:   01/08/23  Speech Start Time:  0900  Speech Stop Time:  0920     Speech Total Time (min):  20 min    Billable minutes:  Swallow and Oral Function Evaluation, 20 minutes      01/08/2023

## 2023-01-09 LAB
AV INDEX (PROSTH): 0.59
AV MEAN GRADIENT: 4 MMHG
AV PEAK GRADIENT: 7 MMHG
AV VELOCITY RATIO: 0.65
BSA FOR ECHO PROCEDURE: 2.04 M2
CV ECHO LV RWT: 0.46 CM
DOP CALC AO PEAK VEL: 1.31 M/S
DOP CALC AO VTI: 24.1 CM
DOP CALC LVOT PEAK VEL: 0.85 M/S
DOP CALCLVOT PEAK VEL VTI: 14.2 CM
E/A RATIO: 0.74
E/E' RATIO: 8.13 M/S
ECHO LV POSTERIOR WALL: 1.15 CM (ref 0.6–1.1)
EJECTION FRACTION: 35 %
FRACTIONAL SHORTENING: 22 % (ref 28–44)
INTERVENTRICULAR SEPTUM: 1.19 CM (ref 0.6–1.1)
LEFT ATRIUM SIZE: 4 CM
LEFT ATRIUM VOLUME INDEX MOD: 34.1 ML/M2
LEFT ATRIUM VOLUME MOD: 68.9 CM3
LEFT INTERNAL DIMENSION IN SYSTOLE: 3.89 CM (ref 2.1–4)
LEFT VENTRICLE DIASTOLIC VOLUME INDEX: 58.91 ML/M2
LEFT VENTRICLE DIASTOLIC VOLUME: 119 ML
LEFT VENTRICLE MASS INDEX: 112 G/M2
LEFT VENTRICLE SYSTOLIC VOLUME INDEX: 32.4 ML/M2
LEFT VENTRICLE SYSTOLIC VOLUME: 65.5 ML
LEFT VENTRICULAR INTERNAL DIMENSION IN DIASTOLE: 5.01 CM (ref 3.5–6)
LEFT VENTRICULAR MASS: 226.33 G
LV LATERAL E/E' RATIO: 6.78 M/S
LV SEPTAL E/E' RATIO: 10.17 M/S
LVOT MG: 1 MMHG
LVOT MV: 0.54 CM/S
MV PEAK A VEL: 0.82 M/S
MV PEAK E VEL: 0.61 M/S
PV PEAK VELOCITY: 1.08 CM/S
RA PRESSURE: 8 MMHG
RIGHT VENTRICULAR END-DIASTOLIC DIMENSION: 3.85 CM
TDI LATERAL: 0.09 M/S
TDI SEPTAL: 0.06 M/S
TDI: 0.08 M/S
TRICUSPID ANNULAR PLANE SYSTOLIC EXCURSION: 1.07 CM

## 2023-01-09 PROCEDURE — 99204 OFFICE O/P NEW MOD 45 MIN: CPT | Mod: ,,, | Performed by: PSYCHIATRY & NEUROLOGY

## 2023-01-09 PROCEDURE — 25000003 PHARM REV CODE 250: Performed by: INTERNAL MEDICINE

## 2023-01-09 PROCEDURE — 99204 PR OFFICE/OUTPT VISIT, NEW, LEVL IV, 45-59 MIN: ICD-10-PCS | Mod: ,,, | Performed by: PSYCHIATRY & NEUROLOGY

## 2023-01-09 PROCEDURE — 96372 THER/PROPH/DIAG INJ SC/IM: CPT | Performed by: INTERNAL MEDICINE

## 2023-01-09 PROCEDURE — G0378 HOSPITAL OBSERVATION PER HR: HCPCS

## 2023-01-09 PROCEDURE — S4991 NICOTINE PATCH NONLEGEND: HCPCS | Performed by: INTERNAL MEDICINE

## 2023-01-09 PROCEDURE — 25000003 PHARM REV CODE 250: Performed by: NURSE PRACTITIONER

## 2023-01-09 PROCEDURE — 63600175 PHARM REV CODE 636 W HCPCS: Performed by: INTERNAL MEDICINE

## 2023-01-09 RX ORDER — CARVEDILOL 12.5 MG/1
12.5 TABLET ORAL 2 TIMES DAILY
Status: DISCONTINUED | OUTPATIENT
Start: 2023-01-09 | End: 2023-01-10

## 2023-01-09 RX ORDER — PANTOPRAZOLE SODIUM 40 MG/1
40 TABLET, DELAYED RELEASE ORAL DAILY
Status: DISCONTINUED | OUTPATIENT
Start: 2023-01-10 | End: 2023-01-11 | Stop reason: HOSPADM

## 2023-01-09 RX ORDER — IBUPROFEN 200 MG
1 TABLET ORAL DAILY
Status: DISCONTINUED | OUTPATIENT
Start: 2023-01-09 | End: 2023-01-11 | Stop reason: HOSPADM

## 2023-01-09 RX ADMIN — ASPIRIN 81 MG: 81 TABLET, COATED ORAL at 08:01

## 2023-01-09 RX ADMIN — LISINOPRIL 2.5 MG: 2.5 TABLET ORAL at 10:01

## 2023-01-09 RX ADMIN — NICOTINE 1 PATCH: 21 PATCH, EXTENDED RELEASE TRANSDERMAL at 08:01

## 2023-01-09 RX ADMIN — ENOXAPARIN SODIUM 40 MG: 40 INJECTION SUBCUTANEOUS at 04:01

## 2023-01-09 RX ADMIN — CLOPIDOGREL BISULFATE 75 MG: 75 TABLET ORAL at 08:01

## 2023-01-09 RX ADMIN — ATORVASTATIN CALCIUM 10 MG: 10 TABLET, FILM COATED ORAL at 08:01

## 2023-01-09 RX ADMIN — CARVEDILOL 12.5 MG: 12.5 TABLET, FILM COATED ORAL at 08:01

## 2023-01-09 NOTE — PROGRESS NOTES
01/09/23 1111   Discharge Assessment   Assessment Type Discharge Planning Assessment   Confirmed/corrected address, phone number and insurance Yes   Confirmed Demographics Correct on Facesheet   Source of Information patient   Communicated HARESH with patient/caregiver Date not available/Unable to determine   Reason For Admission AMS   People in Home alone   Do you expect to return to your current living situation? Yes   Do you have help at home or someone to help you manage your care at home? No   Prior to hospitilization cognitive status: Unable to Assess   Current cognitive status: Alert/Oriented   Equipment Currently Used at Home none   Readmission within 30 days? No   Patient currently being followed by outpatient case management? No   Do you currently have service(s) that help you manage your care at home? No   Do you take prescription medications? Yes  (fills at [facility])   Do you have prescription coverage? Yes   Coverage Medicaid   Do you have any problems affording any of your prescribed medications? No   Is the patient taking medications as prescribed? yes   Who is going to help you get home at discharge? friend   How do you get to doctors appointments? family or friend will provide   Are you on dialysis? No   Do you take coumadin? No   Discharge Plan A Home   Discharge Plan B Home   DME Needed Upon Discharge  none   Discharge Plan discussed with: Patient   Discharge Barriers Identified None

## 2023-01-09 NOTE — NURSING
Nurses Note -- 4 Eyes      1/8/2023   11:37 PM      Skin assessed during: Admit      [x] No Pressure Injuries Present    []Prevention Measures Documented      [] Yes- Altered Skin Integrity Present or Discovered   [] LDA Added if Not in Epic (Describe Wound)   [] New Altered Skin Integrity was Present on Admit and Documented in LDA   [] Wound Image Taken    Wound Care Consulted? No    Attending Nurse:  Sabine Fong RN     Second RN/Staff Member:  luis angel limon

## 2023-01-09 NOTE — CONSULTS
Inpatient consult to Cardiology  Consult performed by: TELMA Macdonald  Consult ordered by: Carmen Barr MD  Reason for consult: PAF      [facility] - 4th Floor Medical Telemetry  Cardiology  Consult Note    Patient Name: Marino Mccrary  MRN: [id number]  Admission Date: 1/7/2023  Hospital Length of Stay: 0 days  Code Status: Full Code   Attending Provider: Manuel Guzman MD   Consulting Provider: TELMA Macdonald  Primary Care Physician: TERESSA Andrews MD  Principal Problem:AMS (altered mental status)    Patient information was obtained from patient, past medical records, ER records, and primary team.     Subjective:   Consultation Reason: PAF    HPI:   Mr. Mccrary is a 62 year old male, known to Dr. Jayce Ramires, who presented to the hospital with lethargy. He was noted to be confused and hypotensive on EMS arrival. IV Fluids were given with some improvement in his BP. Troponin noted to be mildly elevated at 0.04. There was some suspicion that polypharmacy was playing a role in his AMS, but drug toxicology noted to be negative. He is negative for Acute COVID & Influenza Infections. CT Head revealed no evidence of acute infarct, but did reveal Small focus of hyperattenuation in the expected area of the left anterior cerebral artery, question aneurysm. EKG revealed SR with First Degree AV Block.     PMH: CAD/CABG, Hypertension/HHD, ICMO, Hyperlipidemia, CVA  PSH: LHC, CABG, Knee Surgery, Back Surgery  Family History: Father- Heart Ailment, Mother- Hypertension, Sister- CVA/Hypertension  Social History: Tobacco- Active Smoker, Alcohol- Negative, Substance Abuse- Negative    Previous Cardiac Diagnostics:   Echocardiogram (1.9.23):  The left ventricle is mildly enlarged with concentric remodeling and severely decreased systolic function. The estimated ejection fraction is 35%. There is left ventricular global hypokinesis.  Grade I left ventricular diastolic dysfunction.  Normal right  ventricular size with normal right ventricular systolic function.  Mild aortic regurgitation.  Mild mitral regurgitation.  The estimated ejection fraction is 35%.    Coronary Angiogram (3.21.22):  LM- 30-40%, LAD- 30-40% Proximal to Mid Disease, LCX/OM- Patient with Mld Disease, RCA- Small with  of Distal Disease & Mid 50% Stenosis.  SVG to D1- Patent, LIMA to LAD- Atretic, SVG to OM- Occluded, SVG to RCA- Occluded.    Review of patient's allergies indicates:  No Known Allergies    No current facility-administered medications on file prior to encounter.     Current Outpatient Medications on File Prior to Encounter   Medication Sig    amitriptyline (ELAVIL) 50 MG tablet Take 25 mg by mouth every evening.    aspirin (ECOTRIN) 81 MG EC tablet Take 81 mg by mouth once daily.    atorvastatin (LIPITOR) 10 MG tablet Take 10 mg by mouth once daily.    clopidogreL (PLAVIX) 75 mg tablet Take 75 mg by mouth once daily.    lisinopriL (PRINIVIL,ZESTRIL) 2.5 MG tablet Take 2.5 mg by mouth once daily.    sacubitriL-valsartan (ENTRESTO) 24-26 mg per tablet Take 1 tablet by mouth 2 (two) times daily.    scopolamine (TRANSDERM-SCOP) 1.3-1.5 mg (1 mg over 3 days) Place 1 patch onto the skin every 72 hours.     Review of Systems   Respiratory:  Negative for chest tightness and shortness of breath.    Cardiovascular:  Negative for chest pain.   All other systems reviewed and are negative.    Objective:     Vital Signs (Most Recent):  Temp: 97.5 °F (36.4 °C) (01/09/23 1506)  Pulse: 89 (01/09/23 1506)  Resp: 18 (01/09/23 0423)  BP: (!) 151/84 (01/09/23 1506)  SpO2: 95 % (01/09/23 1506)   Vital Signs (24h Range):  Temp:  [97.5 °F (36.4 °C)-99.1 °F (37.3 °C)] 97.5 °F (36.4 °C)  Pulse:  [60-93] 89  Resp:  [13-18] 18  SpO2:  [95 %-99 %] 95 %  BP: (117-151)/(68-90) 151/84     Weight: 83.9 kg (185 lb)  Body mass index is 26.54 kg/m².    SpO2: 95 %         Intake/Output Summary (Last 24 hours) at 1/9/2023 1506  Last data filed at 1/9/2023  0712  Gross per 24 hour   Intake 240 ml   Output --   Net 240 ml       Lines/Drains/Airways       Peripheral Intravenous Line  Duration                  Peripheral IV - Single Lumen 01/08/23 2013 20 G Left;Posterior Wrist <1 day                  Significant Labs:  Recent Results (from the past 72 hour(s))   Comprehensive metabolic panel    Collection Time: 01/07/23  3:10 PM   Result Value Ref Range    Sodium Level 141 136 - 145 mmol/L    Potassium Level 4.1 3.5 - 5.1 mmol/L    Chloride 106 98 - 107 mmol/L    Carbon Dioxide 28 23 - 31 mmol/L    Glucose Level 105 82 - 115 mg/dL    Blood Urea Nitrogen 13.6 8.4 - 25.7 mg/dL    Creatinine 0.95 0.73 - 1.18 mg/dL    Calcium Level Total 9.2 8.8 - 10.0 mg/dL    Protein Total 6.3 5.8 - 7.6 gm/dL    Albumin Level 3.4 3.4 - 4.8 g/dL    Globulin 2.9 2.4 - 3.5 gm/dL    Albumin/Globulin Ratio 1.2 1.1 - 2.0 ratio    Bilirubin Total 0.3 <=1.5 mg/dL    Alkaline Phosphatase 67 40 - 150 unit/L    Alanine Aminotransferase 30 0 - 55 unit/L    Aspartate Aminotransferase 28 5 - 34 unit/L    eGFR 90 mls/min/1.73/m2   Brain natriuretic peptide    Collection Time: 01/07/23  3:10 PM   Result Value Ref Range    Natriuretic Peptide 101.1 (H) <=100.0 pg/mL   Troponin I    Collection Time: 01/07/23  3:10 PM   Result Value Ref Range    Troponin-I 0.048 (H) 0.000 - 0.045 ng/mL   APTT    Collection Time: 01/07/23  3:10 PM   Result Value Ref Range    PTT 25.1 23.2 - 33.7 seconds   Protime-INR    Collection Time: 01/07/23  3:10 PM   Result Value Ref Range    PT 12.9 12.5 - 14.5 seconds    INR 0.98 0.00 - 1.30   Lipase    Collection Time: 01/07/23  3:10 PM   Result Value Ref Range    Lipase Level 27 <=60 U/L   Lactic acid, plasma    Collection Time: 01/07/23  3:10 PM   Result Value Ref Range    Lactic Acid Level 1.6 0.5 - 2.2 mmol/L   Blood culture #1 **CANNOT BE ORDERED STAT**    Collection Time: 01/07/23  3:10 PM    Specimen: Blood   Result Value Ref Range    CULTURE, BLOOD ([facility]) No Growth At 24 Hours     Blood culture #2 **CANNOT BE ORDERED STAT**    Collection Time: 01/07/23  3:10 PM    Specimen: Blood   Result Value Ref Range    CULTURE, BLOOD ([facility]) No Growth At 24 Hours    CBC with Differential    Collection Time: 01/07/23  3:10 PM   Result Value Ref Range    WBC 8.1 4.5 - 11.5 x10(3)/mcL    RBC 5.31 4.70 - 6.10 x10(6)/mcL    Hgb 15.4 14.0 - 18.0 gm/dL    Hct 46.9 42.0 - 52.0 %    MCV 88.3 80.0 - 94.0 fL    MCH 29.0 pg    MCHC 32.8 (L) 33.0 - 36.0 mg/dL    RDW 14.1 11.6 - 14.4 %    Platelet 179 140 - 371 x10(3)/mcL    MPV 10.9 9.4 - 12.4 fL    Neut % 75.9 %    Lymph % 14.9 %    Mono % 8.4 %    Eos % 0.2 %    Basophil % 0.4 %    Lymph # 1.20 0.6 - 4.6 x10(3)/mcL    Neut # 6.10 2.1 - 9.2 x10(3)/mcL    Mono # 0.68 0.1 - 1.3 x10(3)/mcL    Eos # 0.02 0 - 0.9 x10(3)/mcL    Baso # 0.03 0 - 0.2 x10(3)/mcL    IG# 0.02 0 - 0.04 x10(3)/mcL    IG% 0.2 %    NRBC% 0.0 0 - 1 %   Ethanol    Collection Time: 01/07/23  3:10 PM   Result Value Ref Range    Ethanol Level <10.0 <=10.0 mg/dL   COVID/FLU A&B PCR    Collection Time: 01/07/23  3:53 PM   Result Value Ref Range    Influenza A PCR Not Detected Not Detected    Influenza B PCR Not Detected Not Detected    SARS-CoV-2 PCR Not Detected Not Detected   Urinalysis, Reflex to Urine Culture Urine, Clean Catch    Collection Time: 01/07/23  4:19 PM    Specimen: Urine   Result Value Ref Range    Color, UA Yellow Yellow, Light-Yellow, Dark Yellow, Lynnette, Straw    Appearance, UA Clear Clear    Specific Minto, UA 1.011 1.001 - 1.030    pH, UA 7.0 5.0 - 8.5    Protein, UA Trace (A) Negative mg/dL    Glucose, UA Negative Negative, Normal mg/dL    Ketones, UA Negative Negative mg/dL    Blood, UA Negative Negative unit/L    Bilirubin, UA Negative Negative mg/dL    Urobilinogen, UA 0.2 0.2, 1.0, Normal mg/dL    Nitrites, UA Negative Negative    Leukocyte Esterase, UA Negative Negative unit/L   Urinalysis, Microscopic    Collection Time: 01/07/23  4:19 PM   Result Value Ref Range    RBC, UA  <5 <=5 /HPF    WBC, UA <5 <=5 /HPF    Squamous Epithelial Cells, UA <5 <=5 /HPF    Bacteria, UA None Seen None Seen, Rare, Occasional /HPF   Drug Screen, Urine    Collection Time: 01/07/23  6:12 PM   Result Value Ref Range    Amphetamines, Urine Negative Negative    Barbituates, Urine Negative Negative    Benzodiazepine, Urine Negative Negative    Cannabinoids, Urine Negative Negative    Cocaine, Urine Negative Negative    Fentanyl, Urine Negative Negative    MDMA, Urine Negative Negative    Opiates, Urine Negative Negative    Phencyclidine, Urine Negative Negative    pH, Urine 7.0 3.0 - 11.0   Troponin I    Collection Time: 01/08/23 12:32 AM   Result Value Ref Range    Troponin-I 0.047 (H) 0.000 - 0.045 ng/mL   Troponin I    Collection Time: 01/08/23  6:01 AM   Result Value Ref Range    Troponin-I 0.048 (H) 0.000 - 0.045 ng/mL   Comprehensive metabolic panel    Collection Time: 01/08/23  6:01 AM   Result Value Ref Range    Sodium Level 140 136 - 145 mmol/L    Potassium Level 4.6 3.5 - 5.1 mmol/L    Chloride 105 98 - 107 mmol/L    Carbon Dioxide 27 23 - 31 mmol/L    Glucose Level 87 82 - 115 mg/dL    Blood Urea Nitrogen 10.0 8.4 - 25.7 mg/dL    Creatinine 0.84 0.73 - 1.18 mg/dL    Calcium Level Total 9.3 8.8 - 10.0 mg/dL    Protein Total 6.6 5.8 - 7.6 gm/dL    Albumin Level 3.6 3.4 - 4.8 g/dL    Globulin 3.0 2.4 - 3.5 gm/dL    Albumin/Globulin Ratio 1.2 1.1 - 2.0 ratio    Bilirubin Total 0.7 <=1.5 mg/dL    Alkaline Phosphatase 72 40 - 150 unit/L    Alanine Aminotransferase 29 0 - 55 unit/L    Aspartate Aminotransferase 29 5 - 34 unit/L    eGFR >90 mls/min/1.73/m2   CBC with Differential    Collection Time: 01/08/23  6:01 AM   Result Value Ref Range    WBC 5.4 4.5 - 11.5 x10(3)/mcL    RBC 5.56 4.70 - 6.10 x10(6)/mcL    Hgb 15.9 14.0 - 18.0 gm/dL    Hct 49.2 42.0 - 52.0 %    MCV 88.5 80.0 - 94.0 fL    MCH 28.6 pg    MCHC 32.3 (L) 33.0 - 36.0 mg/dL    RDW 14.1 11.6 - 14.4 %    Platelet 181 140 - 371 x10(3)/mcL    MPV  11.0 9.4 - 12.4 fL    Neut % 57.9 %    Lymph % 31.5 %    Mono % 9.1 %    Eos % 0.7 %    Basophil % 0.6 %    Lymph # 1.69 0.6 - 4.6 x10(3)/mcL    Neut # 3.10 2.1 - 9.2 x10(3)/mcL    Mono # 0.49 0.1 - 1.3 x10(3)/mcL    Eos # 0.04 0 - 0.9 x10(3)/mcL    Baso # 0.03 0 - 0.2 x10(3)/mcL    IG# 0.01 0 - 0.04 x10(3)/mcL    IG% 0.2 %    NRBC% 0.0 0 - 1 %   Echo    Collection Time: 01/09/23 12:36 PM   Result Value Ref Range    BSA 2.04 m2    TDI SEPTAL 0.06 m/s    LV LATERAL E/E' RATIO 6.78 m/s    LV SEPTAL E/E' RATIO 10.17 m/s    EF 35 %    Left Ventricular Outflow Tract Mean Velocity 0.54 cm/s    Left Ventricular Outflow Tract Mean Gradient 1.00 mmHg    TDI LATERAL 0.09 m/s    PV PEAK VELOCITY 1.08 cm/s    LVIDd 5.01 3.5 - 6.0 cm    IVS 1.19 (A) 0.6 - 1.1 cm    Posterior Wall 1.15 (A) 0.6 - 1.1 cm    LVIDs 3.89 2.1 - 4.0 cm    FS 22 28 - 44 %    LV mass 226.33 g    LA size 4.00 cm    RVDD 3.85 cm    TAPSE 1.07 cm    Left Ventricle Relative Wall Thickness 0.46 cm    AV mean gradient 4 mmHg    AV Velocity Ratio 0.65     AV index (prosthetic) 0.59     E/A ratio 0.74     Mean e' 0.08 m/s    LVOT peak koby 0.85 m/s    LVOT peak VTI 14.20 cm    Ao peak koby 1.31 m/s    Ao VTI 24.1 cm    AV peak gradient 7 mmHg    E/E' ratio 8.13 m/s    MV Peak E Koby 0.61 m/s    MV Peak A Koby 0.82 m/s    LV Systolic Volume 65.50 mL    LV Systolic Volume Index 32.4 mL/m2    LV Diastolic Volume 119.00 mL    LV Diastolic Volume Index 58.91 mL/m2    LV Mass Index 112 g/m2    LA Volume Index (Mod) 34.1 mL/m2    LA volume (mod) 68.90 cm3    Right Atrial Pressure (from IVC) 8 mmHg       Significant Imaging:  Imaging Results              CTA Head and Neck (xpd) (Final result)  Result time 01/07/23 18:58:37   Procedure changed from CTA Brain     Final result by Nader Jain MD (01/07/23 18:58:37)                   Impression:      1. No high-grade arterial stenosis or occlusion in the head or neck.  2. 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      Electronically signed by: Nader Jain  Date:    01/07/2023  Time:    18:58               Narrative:    EXAMINATION:  CTA HEAD AND NECK (XPD)    CLINICAL HISTORY:  Cerebral aneurysm, follow-up;    TECHNIQUE:  Helical acquisition through the head and neck without and with IV contrast targeting the arterial phase. 3D MIP reconstructions made available for review. DLP 1926 mGycm. Automatic exposure control, adjustment of mA/kV or iterative reconstruction technique was used to reduce radiation.  NASCET criteria utilized.    COMPARISON:  No priors    FINDINGS:  Three-vessel aortic arch.  Some streak artifact obscures the proximal left common carotid and vertebral arteries.  Visualized portions of the common carotid arteries are widely patent.  There is some atherosclerotic plaque near the carotid bulbs but only mild narrowing here.  There is dense atherosclerotic plaque along the cavernous portions of the internal carotid arteries with moderate narrowing.  The cervical vertebral arteries are widely patent.    There is no high-grade intracranial stenosis or occlusion.  There is left anterior cerebral artery aneurysm measuring up to 6 mm on image 255 series 5.                                       CT Head Without Contrast (Final result)  Result time 01/07/23 17:06:51      Final result by Nader Jain MD (01/07/23 17:06:51)                   Impression:      1. No acute cortical infarct, hemorrhage or mass lesion.  2. Small focus of hyperattenuation in the expected area of the left anterior cerebral artery, question aneurysm.      Electronically signed by: Nader Jain  Date:    01/07/2023  Time:    17:06               Narrative:    EXAMINATION:  CT HEAD WITHOUT CONTRAST    CLINICAL HISTORY:  Mental status change, unknown cause;    TECHNIQUE:  CT imaging of the head performed from the skull base to the vertex without intravenous contrast.  mGycm. Automatic exposure control, adjustment of mA/kV or  iterative reconstruction technique was used to reduce radiation.    COMPARISON:  7 November 2013    FINDINGS:  There is no acute cortical infarct, hemorrhage or mass lesion.  Bilateral areas of encephalomalacia are unchanged.  There is some ex vacuo dilatation of the ventricles.  There is a focus of hyperattenuation near the expected area of the left anterior cerebral artery on image 17 series 2 measuring 6 mm.  This was not clearly seen on prior.    Visualized paranasal sinuses and mastoid air cells are clear.                                       X-Ray Chest AP Portable (Final result)  Result time 01/07/23 15:43:56      Final result by Arnulfo Yadav MD (01/07/23 15:43:56)                   Impression:      No acute cardiopulmonary process.      Electronically signed by: Arnulfo Yadav MD  Date:    01/07/2023  Time:    15:43               Narrative:    EXAMINATION:  XR CHEST AP PORTABLE    CLINICAL HISTORY:  hypotension;    TECHNIQUE:  Single frontal view of the chest was performed.    COMPARISON:  12/09/2013    FINDINGS:  Left chest wall AICD is identified.  Heart size enlarged the pulmonary vasculature is normal.  Postsurgical changes of CABG.    Coarse interstitial markings lungs.  No evidence for effusion.                                    EKG:        Telemetry:  Sinus Rhythm    Physical Exam  Vitals reviewed.   Constitutional:       General: He is not in acute distress.  HENT:      Head: Normocephalic.   Cardiovascular:      Rate and Rhythm: Normal rate and regular rhythm.      Heart sounds: Normal heart sounds.      Comments: Sinus Rhythm   Pulmonary:      Effort: Pulmonary effort is normal. No respiratory distress.      Breath sounds: Normal breath sounds.   Abdominal:      General: There is no distension.      Palpations: Abdomen is soft.      Tenderness: There is no abdominal tenderness.   Musculoskeletal:         General: Normal range of motion.      Cervical back: Neck supple.   Skin:     General: Skin is  warm and dry.   Neurological:      Comments: Pleasantly Confused.     Home Medications:   No current facility-administered medications on file prior to encounter.     Current Outpatient Medications on File Prior to Encounter   Medication Sig Dispense Refill    amitriptyline (ELAVIL) 50 MG tablet Take 25 mg by mouth every evening.      aspirin (ECOTRIN) 81 MG EC tablet Take 81 mg by mouth once daily.      atorvastatin (LIPITOR) 10 MG tablet Take 10 mg by mouth once daily.      clopidogreL (PLAVIX) 75 mg tablet Take 75 mg by mouth once daily.      lisinopriL (PRINIVIL,ZESTRIL) 2.5 MG tablet Take 2.5 mg by mouth once daily.      sacubitriL-valsartan (ENTRESTO) 24-26 mg per tablet Take 1 tablet by mouth 2 (two) times daily.      scopolamine (TRANSDERM-SCOP) 1.3-1.5 mg (1 mg over 3 days) Place 1 patch onto the skin every 72 hours.         Current Inpatient Medications:    Current Facility-Administered Medications:     aspirin EC tablet 81 mg, 81 mg, Oral, Daily, Manuel Guzman MD, 81 mg at 01/09/23 0839    atorvastatin tablet 10 mg, 10 mg, Oral, Daily, Manuel Guzman MD, 10 mg at 01/09/23 0839    clopidogreL tablet 75 mg, 75 mg, Oral, Daily, Manuel Guzman MD, 75 mg at 01/09/23 0840    enoxaparin injection 40 mg, 40 mg, Subcutaneous, Daily, Manuel Guzman MD, 40 mg at 01/08/23 1700    lisinopriL tablet 2.5 mg, 2.5 mg, Oral, Daily, Manuel Guzman MD, 2.5 mg at 01/09/23 1034    melatonin tablet 6 mg, 6 mg, Oral, Nightly PRN, Manuel Guzman MD    sodium chloride 0.9% flush 10 mL, 10 mL, Intravenous, PRN, Manuel Guzman MD         VTE Risk Mitigation (From admission, onward)           Ordered     enoxaparin injection 40 mg  Daily         01/08/23 0020     IP VTE HIGH RISK PATIENT  Once         01/08/23 0020     Place sequential compression device  Until discontinued         01/08/23 0020                  Assessment:   Altered Mental Status due to Suspected Polypharmacy    -  Drug Toxicology Negative  Hypotension (Resolved with IV Fluid Resuscitation)  Elevated Troponin- in the Setting of Altered Mental Status, Do not Suspect ACS    - Peak 0.04 Flat Trend  CAD/CABG    - SVG to D1- Patent, LIMA to LAD- Atretic, SVG to OM- Occluded, SVG to RCA- Occluded (RCA Distal  with 50% Mid) (3.21.22)  Ischemic Cardiomyopathy    - EF 35%    - Grade I Diastolic Dysfunction    - Status Post ICD ([employer])  Left anterior cerebral artery aneurysm measuring up to 6 mm.  History of CVA  Hyperlipidemia  History of Hypertension  Nicotine Dependence    Plan:   Resume Coreg- Will start with 1/2 of home dose given presenting hypotension (On 25 Mg PO BID at Home)  Continue Current Cardiac Medications  No Evidence of AF on EKG or Tele. Will Interrogate Device.  Will follow up tomorrow.  Discontinue Lisinopril.. On Entresto Outpatient, plan to resume if BP Permits.  Thank you for your consult.     Hayley López, TELMA  Cardiology  [facility] - 4th Floor Medical Telemetry  01/09/2023 3:06 PM     I have seen and examined patient my self. I agree with Mr. López NP. H and P, physical exam and assessment.  Patient is a 61-year-old male with past medical history hypertension, CAD, ischemic cardiomyopathy status post ICD implantation, history of stroke and nicotine dependence admitted to the hospital with altered mental status.  At moment of exam he reports he is feeling fine and he does not remember why he is in the hospital.  Denies any chest pain, shortness of breath, palpitations, lower extremity edema, orthopnea or PND.  Telemetry documents normal sinus rhythm with occasional PVCs.  Recommend continue cardiac prudent medications, we are going to check [employer] ICD to rule out atrial fibrillation.  From Cardiology point of view no further tests or interventions are indicated at this point.  Please call us with any questions.   Nathanael Casas MD.

## 2023-01-09 NOTE — PT/OT/SLP DISCHARGE
Speech Language Pathology Department  Discharge Summary    Patient Name:  Marino Mccrary   MRN:  [id number]  Admitting Diagnosis: AMS (altered mental status)    Recommendations:     General Recommendations: SLP intervention not indicated  Diet recommendations:  Easy to Chew Diet - IDDSI Level 7, Liquid Diet Level: Thin liquids - IDDSI Level 0   Aspiration Precautions: small bites/sips and slow rate  General Precautions: Standard, aspiration, aphasia  Communication strategies:  none    Pt tolerating diet without signs/sx of aspiration. No skilled SLP intervention is warranted at this time. SLP to sign off.

## 2023-01-09 NOTE — CONSULTS
Neurointerventional Consult      Reason for consult: cerebral aneurysm    Patient is a 62-year-old male with a past medical history of CAD/MI, CVA with residual left-sided weakness, hypertension who was brought to the ER because family members found him lethargic.  He was confused and hypotensive on arrival and was given normal saline with improvement in his blood pressure.  Lab work was completely unremarkable except for a very minimally elevated troponin of 0.048.  EKG showed no acute ST elevations or depressions.  CT head was unremarkable and CTA of the head and neck noted a 6 mm cerebral aneurysm for which Interventional Neurology recommended outpatient follow-up.  At the time of my evaluation patient was drowsy but awake and alert and oriented x4 and report he took his sleepy meds this morning because he stayed up all night.    CTA shows 6mm unruptured left AMBER aneuryam      ROS:  Review of Systems   All other systems reviewed and are negative.     History reviewed. No pertinent past medical history.  History reviewed. No pertinent surgical history.  History reviewed. No pertinent family history.  Review of patient's allergies indicates:  No Known Allergies    Current Facility-Administered Medications:     aspirin EC tablet 81 mg, 81 mg, Oral, Daily, Manuel Guzman MD, 81 mg at 01/09/23 0839    atorvastatin tablet 10 mg, 10 mg, Oral, Daily, Manuel Guzman MD, 10 mg at 01/09/23 0839    clopidogreL tablet 75 mg, 75 mg, Oral, Daily, Manuel Guzman MD, 75 mg at 01/09/23 0840    enoxaparin injection 40 mg, 40 mg, Subcutaneous, Daily, Manuel Guzman MD, 40 mg at 01/08/23 1700    lisinopriL tablet 2.5 mg, 2.5 mg, Oral, Daily, Manuel Guzman MD, 2.5 mg at 01/09/23 1034    melatonin tablet 6 mg, 6 mg, Oral, Nightly PRN, Manuel Guzman MD    sodium chloride 0.9% flush 10 mL, 10 mL, Intravenous, PRN, Manuel Guzman MD  Outpatient Medications Marked as Taking for the 1/7/23  encounter (Hospital Encounter)   Medication Sig Dispense Refill    amitriptyline (ELAVIL) 50 MG tablet Take 25 mg by mouth every evening.      aspirin (ECOTRIN) 81 MG EC tablet Take 81 mg by mouth once daily.      atorvastatin (LIPITOR) 10 MG tablet Take 10 mg by mouth once daily.      clopidogreL (PLAVIX) 75 mg tablet Take 75 mg by mouth once daily.      lisinopriL (PRINIVIL,ZESTRIL) 2.5 MG tablet Take 2.5 mg by mouth once daily.      sacubitriL-valsartan (ENTRESTO) 24-26 mg per tablet Take 1 tablet by mouth 2 (two) times daily.      scopolamine (TRANSDERM-SCOP) 1.3-1.5 mg (1 mg over 3 days) Place 1 patch onto the skin every 72 hours.              Vitals:    01/09/23 0819   BP: 127/76   Pulse: 93   Resp:    Temp: 99.1 °F (37.3 °C)     Gen NAD  HEENT NC/AT  CV RRR, no carotid bruits  Resp clear  GI soft  Ext no C/C/E  Neuro  AAOx4  Speech fluent, appropriate  EOMI, PERRLA, VFF  LUMN facial droop  Tongue and palate midline  Motor LHP  Sensation intact  DTRs symmetric  Coord intact      Assessment: Left AMBER aneurysm (unruptured)    Plan: We will see him in the office for outpatient evaluation and possible coiling    Thank you for the consult.  Please call with any questions.

## 2023-01-09 NOTE — PROGRESS NOTES
[facility] Hospital Medicine Progress Note        Chief Complaint: Inpatient Follow-up for     HPI:  62-year-old male with a past medical history of CAD/MI, CVA with residual left-sided weakness, hypertension who was brought to the ER because family members found him lethargic.  He was confused and hypotensive on arrival and was given normal saline with improvement in his blood pressure.  Lab work was completely unremarkable except for a very minimally elevated troponin of 0.048.  EKG showed no acute ST elevations or depressions.  CT head was unremarkable and CTA of the head and neck noted a 6 mm cerebral aneurysm for which Interventional Neurology recommended outpatient follow-up.  At the time of my evaluation patient was drowsy but awake and alert and oriented x4 and report he took his sleepy meds this morning because he stayed up all night    Interval Hx:   Patient seen and examined this morning no family member bedside was just getting echo done    Objective/physical exam:  General: In no acute distress, afebrile  Chest: Clear to auscultation bilaterally  Heart: RRR, +S1, S2, no appreciable murmur  Abdomen: Soft, nontender, BS +  MSK: Warm, no lower extremity edema, no clubbing or cyanosis  Neurologic: Alert and awake   VITAL SIGNS: 24 HRS MIN & MAX LAST   Temp  Min: 97.6 °F (36.4 °C)  Max: 99.1 °F (37.3 °C) 99.1 °F (37.3 °C)   BP  Min: 117/68  Max: 149/89 127/76   Pulse  Min: 60  Max: 93  93   Resp  Min: 13  Max: 18 18   SpO2  Min: 96 %  Max: 99 % 96 %       Recent Labs   Lab 01/07/23  1510 01/08/23  0601   WBC 8.1 5.4   RBC 5.31 5.56   HGB 15.4 15.9   HCT 46.9 49.2   MCV 88.3 88.5   MCH 29.0 28.6   MCHC 32.8* 32.3*   RDW 14.1 14.1    181   MPV 10.9 11.0       Recent Labs   Lab 01/07/23  1510 01/08/23  0601    140   K 4.1 4.6   CO2 28 27   BUN 13.6 10.0   CREATININE 0.95 0.84   CALCIUM 9.2 9.3   ALBUMIN 3.4 3.6   ALKPHOS 67 72   ALT 30 29   AST 28 29   BILITOT 0.3 0.7           Microbiology Results (last 7 days)       Procedure Component Value Units Date/Time    Blood culture #1 **CANNOT BE ORDERED STAT** [[id number]]  (Normal) Collected: 01/07/23 1510    Order Status: Completed Specimen: Blood Updated: 01/08/23 1701     CULTURE, BLOOD ([facility]) No Growth At 24 Hours    Blood culture #2 **CANNOT BE ORDERED STAT** [[id number]]  (Normal) Collected: 01/07/23 1510    Order Status: Completed Specimen: Blood Updated: 01/08/23 1701     CULTURE, BLOOD ([facility]) No Growth At 24 Hours             See below for Radiology    Scheduled Med:   aspirin  81 mg Oral Daily    atorvastatin  10 mg Oral Daily    clopidogreL  75 mg Oral Daily    enoxaparin  40 mg Subcutaneous Daily    lisinopriL  2.5 mg Oral Daily        Continuous Infusions:       PRN Meds:  melatonin, sodium chloride 0.9%       Assessment/Plan:  AMS, suspect polypharmacy, resolved  Hypotension, resolved with IVF   Paroxysmal AFib new onset?  NSTEMI, likely demand ischemia from hypotension   Left anterior cerebral artery aneurysm measuring up to 6 mm  Hx CAD/CABG, multiple CVAs w/ resilual left sided weakness, HTN, HLD     Will consult patient's cardiologist heart rate is controlled   But patient is not on any anticoagulation   Troponins were elevated on admission will stable possibly like secondary to hypotension but will get 2D echo  Interventional Neurology was consulted and Dr. King recommends outpatient follow-up  Continue with other home medications    VTE prophylaxis:     Patient condition:  Stable/Fair/Guarded/ Serious/ Critical    Anticipated discharge and Disposition:         All diagnosis and differential diagnosis have been reviewed; assessment and plan has been documented; I have personally reviewed the labs and test results that are presently available; I have reviewed the patients medication list; I have reviewed the consulting providers response and recommendations. I have reviewed or attempted to review medical records  based upon their availability    All of the patient's questions have been  addressed and answered. Patient's is agreeable to the above stated plan. I will continue to monitor closely and make adjustments to medical management as needed.  _____________________________________________________________________    Nutrition Status:    Radiology:  CTA Head and Neck (xpd)  Narrative: EXAMINATION:  CTA HEAD AND NECK (XPD)    CLINICAL HISTORY:  Cerebral aneurysm, follow-up;    TECHNIQUE:  Helical acquisition through the head and neck without and with IV contrast targeting the arterial phase. 3D MIP reconstructions made available for review. DLP 1926 mGycm. Automatic exposure control, adjustment of mA/kV or iterative reconstruction technique was used to reduce radiation.  NASCET criteria utilized.    COMPARISON:  No priors    FINDINGS:  Three-vessel aortic arch.  Some streak artifact obscures the proximal left common carotid and vertebral arteries.  Visualized portions of the common carotid arteries are widely patent.  There is some atherosclerotic plaque near the carotid bulbs but only mild narrowing here.  There is dense atherosclerotic plaque along the cavernous portions of the internal carotid arteries with moderate narrowing.  The cervical vertebral arteries are widely patent.    There is no high-grade intracranial stenosis or occlusion.  There is left anterior cerebral artery aneurysm measuring up to 6 mm on image 255 series 5.  Impression: 1. No high-grade arterial stenosis or occlusion in the head or neck.  2. Left anterior cerebral artery aneurysm measuring up to 6 mm.    Electronically signed by: Nader Jain  Date:    01/07/2023  Time:    18:58  CT Head Without Contrast  Narrative: EXAMINATION:  CT HEAD WITHOUT CONTRAST    CLINICAL HISTORY:  Mental status change, unknown cause;    TECHNIQUE:  CT imaging of the head performed from the skull base to the vertex without intravenous contrast.  mGycm. Automatic  exposure control, adjustment of mA/kV or iterative reconstruction technique was used to reduce radiation.    COMPARISON:  7 November 2013    FINDINGS:  There is no acute cortical infarct, hemorrhage or mass lesion.  Bilateral areas of encephalomalacia are unchanged.  There is some ex vacuo dilatation of the ventricles.  There is a focus of hyperattenuation near the expected area of the left anterior cerebral artery on image 17 series 2 measuring 6 mm.  This was not clearly seen on prior.    Visualized paranasal sinuses and mastoid air cells are clear.  Impression: 1. No acute cortical infarct, hemorrhage or mass lesion.  2. Small focus of hyperattenuation in the expected area of the left anterior cerebral artery, question aneurysm.    Electronically signed by: Nader Jain  Date:    01/07/2023  Time:    17:06  X-Ray Chest AP Portable  Narrative: EXAMINATION:  XR CHEST AP PORTABLE    CLINICAL HISTORY:  hypotension;    TECHNIQUE:  Single frontal view of the chest was performed.    COMPARISON:  12/09/2013    FINDINGS:  Left chest wall AICD is identified.  Heart size enlarged the pulmonary vasculature is normal.  Postsurgical changes of CABG.    Coarse interstitial markings lungs.  No evidence for effusion.  Impression: No acute cardiopulmonary process.    Electronically signed by: Arnulfo Yadav MD  Date:    01/07/2023  Time:    15:43      Carmen Barr MD   01/09/2023

## 2023-01-09 NOTE — PROGRESS NOTES
"Inpatient Nutrition Evaluation    Admit Date: 1/7/2023   Total duration of encounter: 2 days    Nutrition Recommendation/Prescription     Continue oral diet as tolerated.  Add Boost Very High Calorie (provides 530 kcal, 22 g protein per serving).  Encouraged intake.    Nutrition Assessment     Chart Review    Reason Seen: malnutrition screening tool (MST)    Malnutrition Screening Tool Results   Have you recently lost weight without trying?: Unsure  Have you been eating poorly because of a decreased appetite?: No   MST Score: 2     Diagnosis:  AMS, suspect polypharmacy, resolved  Hypotension, resolved with IVF   Paroxysmal AFib new onset?  NSTEMI, likely demand ischemia from hypotension   Left anterior cerebral artery aneurysm measuring up to 6 mm    Relevant Medical History:  CAD/MI, CVA with residual left-sided weakness, hypertension     Nutrition-Related Medications: no pertinent medications at this time    Nutrition-Related Labs: no pertinent labs at this time    Diet Order: Diet Easy to Chew Supervision with Meals  Oral Supplement Order: none  Appetite/Oral Intake: good/% of meals  Factors Affecting Nutritional Intake: difficulty/impaired swallowing  Food/Yarsani/Cultural Preferences: none reported  Food Allergies: none reported       Wound(s):   N/A    Comments    1/9/23 Patient reports a good appetite, denies weight loss, drinks vanilla/strawberry Ensure at home.    Anthropometrics    Height: 5' 10" (177.8 cm) Height Method: Estimated  Last Weight: 83.9 kg (185 lb) (01/08/23 2343) Weight Method: Standard Scale  BMI (Calculated): 26.5  BMI Classification: overweight (BMI 25-29.9)        Ideal Body Weight (IBW), Male: 166 lb     % Ideal Body Weight, Male (lb): 111.45 %                          Usual Weight Provided By: patient, 175#    Wt Readings from Last 3 Encounters:   01/08/23 2343 83.9 kg (185 lb)   01/07/23 1431 83.9 kg (185 lb)      Weight Change(s) Since Admission:  Admit Weight: 83.9 kg (185 " jose) (01/07/23 5834)    Patient Education    Not applicable.    Monitoring & Evaluation     Dietitian will monitor food and beverage intake.  Nutrition Risk/Follow-Up: low (follow-up in 5-7 days)  Patients assigned 'low nutrition risk' status do not qualify for a full nutritional assessment but will be monitored and re-evaluated in a 5-7 day time period. Please consult if re-evaluation needed sooner.

## 2023-01-10 LAB
ANION GAP SERPL CALC-SCNC: 7 MEQ/L
BASOPHILS # BLD AUTO: 0.02 X10(3)/MCL (ref 0–0.2)
BASOPHILS NFR BLD AUTO: 0.4 %
BUN SERPL-MCNC: 17.1 MG/DL (ref 8.4–25.7)
CALCIUM SERPL-MCNC: 9.2 MG/DL (ref 8.8–10)
CHLORIDE SERPL-SCNC: 105 MMOL/L (ref 98–107)
CO2 SERPL-SCNC: 27 MMOL/L (ref 23–31)
CREAT SERPL-MCNC: 0.85 MG/DL (ref 0.73–1.18)
CREAT/UREA NIT SERPL: 20
EOSINOPHIL # BLD AUTO: 0.04 X10(3)/MCL (ref 0–0.9)
EOSINOPHIL NFR BLD AUTO: 0.8 %
ERYTHROCYTE [DISTWIDTH] IN BLOOD BY AUTOMATED COUNT: 14.1 % (ref 11.5–17)
GFR SERPLBLD CREATININE-BSD FMLA CKD-EPI: >60 MLS/MIN/1.73/M2
GLUCOSE SERPL-MCNC: 95 MG/DL (ref 82–115)
HCT VFR BLD AUTO: 46.7 % (ref 42–52)
HGB BLD-MCNC: 15.5 GM/DL (ref 14–18)
IMM GRANULOCYTES # BLD AUTO: 0.01 X10(3)/MCL (ref 0–0.04)
IMM GRANULOCYTES NFR BLD AUTO: 0.2 %
LYMPHOCYTES # BLD AUTO: 1.95 X10(3)/MCL (ref 0.6–4.6)
LYMPHOCYTES NFR BLD AUTO: 37.4 %
MAGNESIUM SERPL-MCNC: 1.9 MG/DL (ref 1.6–2.6)
MCH RBC QN AUTO: 29 PG
MCHC RBC AUTO-ENTMCNC: 33.2 MG/DL (ref 33–36)
MCV RBC AUTO: 87.3 FL (ref 80–94)
MONOCYTES # BLD AUTO: 0.6 X10(3)/MCL (ref 0.1–1.3)
MONOCYTES NFR BLD AUTO: 11.5 %
NEUTROPHILS # BLD AUTO: 2.6 X10(3)/MCL (ref 2.1–9.2)
NEUTROPHILS NFR BLD AUTO: 49.7 %
NRBC BLD AUTO-RTO: 0 %
PLATELET # BLD AUTO: 203 X10(3)/MCL (ref 130–400)
PMV BLD AUTO: 11.2 FL (ref 7.4–10.4)
POTASSIUM SERPL-SCNC: 4.3 MMOL/L (ref 3.5–5.1)
RBC # BLD AUTO: 5.35 X10(6)/MCL (ref 4.7–6.1)
SODIUM SERPL-SCNC: 139 MMOL/L (ref 136–145)
WBC # SPEC AUTO: 5.2 X10(3)/MCL (ref 4.5–11.5)

## 2023-01-10 PROCEDURE — 83735 ASSAY OF MAGNESIUM: CPT | Performed by: NURSE PRACTITIONER

## 2023-01-10 PROCEDURE — 63600175 PHARM REV CODE 636 W HCPCS: Performed by: INTERNAL MEDICINE

## 2023-01-10 PROCEDURE — 97162 PT EVAL MOD COMPLEX 30 MIN: CPT

## 2023-01-10 PROCEDURE — 96372 THER/PROPH/DIAG INJ SC/IM: CPT | Performed by: INTERNAL MEDICINE

## 2023-01-10 PROCEDURE — 85025 COMPLETE CBC W/AUTO DIFF WBC: CPT | Performed by: INTERNAL MEDICINE

## 2023-01-10 PROCEDURE — G0378 HOSPITAL OBSERVATION PER HR: HCPCS

## 2023-01-10 PROCEDURE — 80048 BASIC METABOLIC PNL TOTAL CA: CPT | Performed by: INTERNAL MEDICINE

## 2023-01-10 PROCEDURE — 25000003 PHARM REV CODE 250: Performed by: INTERNAL MEDICINE

## 2023-01-10 PROCEDURE — 36415 COLL VENOUS BLD VENIPUNCTURE: CPT | Performed by: INTERNAL MEDICINE

## 2023-01-10 PROCEDURE — S4991 NICOTINE PATCH NONLEGEND: HCPCS | Performed by: INTERNAL MEDICINE

## 2023-01-10 PROCEDURE — 25000003 PHARM REV CODE 250: Performed by: NURSE PRACTITIONER

## 2023-01-10 RX ORDER — CARVEDILOL 12.5 MG/1
25 TABLET ORAL 2 TIMES DAILY
Status: DISCONTINUED | OUTPATIENT
Start: 2023-01-10 | End: 2023-01-11 | Stop reason: HOSPADM

## 2023-01-10 RX ADMIN — SACUBITRIL AND VALSARTAN 1 TABLET: 24; 26 TABLET, FILM COATED ORAL at 10:01

## 2023-01-10 RX ADMIN — SACUBITRIL AND VALSARTAN 1 TABLET: 24; 26 TABLET, FILM COATED ORAL at 09:01

## 2023-01-10 RX ADMIN — Medication 6 MG: at 09:01

## 2023-01-10 RX ADMIN — ENOXAPARIN SODIUM 40 MG: 40 INJECTION SUBCUTANEOUS at 05:01

## 2023-01-10 RX ADMIN — NICOTINE 1 PATCH: 21 PATCH, EXTENDED RELEASE TRANSDERMAL at 10:01

## 2023-01-10 RX ADMIN — CARVEDILOL 25 MG: 12.5 TABLET, FILM COATED ORAL at 09:01

## 2023-01-10 RX ADMIN — ATORVASTATIN CALCIUM 10 MG: 10 TABLET, FILM COATED ORAL at 10:01

## 2023-01-10 RX ADMIN — PANTOPRAZOLE SODIUM 40 MG: 40 TABLET, DELAYED RELEASE ORAL at 10:01

## 2023-01-10 RX ADMIN — CLOPIDOGREL BISULFATE 75 MG: 75 TABLET ORAL at 10:01

## 2023-01-10 RX ADMIN — ASPIRIN 81 MG: 81 TABLET, COATED ORAL at 10:01

## 2023-01-10 RX ADMIN — CARVEDILOL 25 MG: 12.5 TABLET, FILM COATED ORAL at 10:01

## 2023-01-10 NOTE — PROGRESS NOTES
[facility] Hospital Medicine Progress Note        Chief Complaint: Inpatient Follow-up for     HPI:   HPI reviewed    Interval Hx:   Afebrile.  Hemodynamically stable.  No episodes of hypotension.  He was comfortably resting in the bed.  He had a mechanical fall this morning with no head injury.  When seen at bedside he was comfortably resting.  Denies any pain, headache.  Reports living by himself at baseline and independent with ADLs and IADLs.  Inquires about discharge.  Labs unremarkable.      TTE showed EF 30% with left ventricular global hypokinesis, grade 1 diastolic dysfunction, mild AR, mild MR.    Objective/physical exam:  Vitals:    01/09/23 2042 01/09/23 2336 01/10/23 0327 01/10/23 0726   BP: 133/86 (!) 149/92 (!) 143/88 (!) 154/86   BP Location: Right arm      Patient Position: Lying      Pulse: 78 66 63 68   Resp:    19   Temp: 97.8 °F (36.6 °C) 98.1 °F (36.7 °C) 98.1 °F (36.7 °C) 98.1 °F (36.7 °C)   TempSrc: Oral Oral Oral Oral   SpO2: 96% 96% 95% 97%   Weight:       Height:         General: In no acute distress, afebrile  Respiratory: Clear to auscultation bilaterally  Cardiovascular: S1, S2, no appreciable murmur  Abdomen: Soft, nontender, BS +  MSK: Warm, no lower extremity edema, no clubbing or cyanosis  Neurologic: Alert and oriented x4, moving all extremities with good strength     Lab Results   Component Value Date     01/10/2023    K 4.3 01/10/2023    CO2 27 01/10/2023    BUN 17.1 01/10/2023    CREATININE 0.85 01/10/2023    CALCIUM 9.2 01/10/2023      Lab Results   Component Value Date    ALT 29 01/08/2023    AST 29 01/08/2023    ALKPHOS 72 01/08/2023    BILITOT 0.7 01/08/2023      Lab Results   Component Value Date    WBC 5.2 01/10/2023    HGB 15.5 01/10/2023    HCT 46.7 01/10/2023    MCV 87.3 01/10/2023     01/10/2023           Medications:   aspirin  81 mg Oral Daily    atorvastatin  10 mg Oral Daily    carvediloL  12.5 mg Oral BID    clopidogreL   75 mg Oral Daily    enoxaparin  40 mg Subcutaneous Daily    nicotine  1 patch Transdermal Daily    pantoprazole  40 mg Oral Daily      melatonin, sodium chloride 0.9%     Assessment/Plan:    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  Mechanical fall while in the hospital     Hx CAD/CABG, multiple CVAs w/ resilual left sided weakness, HTN, HLD     Plan:  -blood pressure looking better pedal resumed Entresto and monitor  -low concern for AFib on telemetry as per Cardiology.  Coreg resumed to home dose. Continue telemetry monitor while inpatient.  TTE reviewed.  No indication for anticoagulation  -encourage p.o. intake.  Fall precautions while inpatient.  Will get PT eval.  -Interventional Neurology was consulted and Dr. King recommends outpatient follow-up  -home meds reviewed      Bobby Martinez MD

## 2023-01-10 NOTE — PROGRESS NOTES
[facility] - 4th Floor Medical Telemetry  Cardiology  Progress Note    Patient Name: Marino Mccrary  MRN: [id number]  Admission Date: 1/7/2023  Hospital Length of Stay: 0 days  Code Status: Full Code   Attending Physician: Manuel Guzman MD   Primary Care Physician: TERESSA Andrews MD  Expected Discharge Date: 1/10/2023  Principal Problem:AMS (altered mental status)    Subjective:   Consultation Reason: PAF     HPI:   Mr. Mccrary is a 62 year old male, known to Dr. Jayce Ramires, who presented to the hospital with lethargy. He was noted to be confused and hypotensive on EMS arrival. IV Fluids were given with some improvement in his BP. Troponin noted to be mildly elevated at 0.04. There was some suspicion that polypharmacy was playing a role in his AMS, but drug toxicology noted to be negative. He is negative for Acute COVID & Influenza Infections. CT Head revealed no evidence of acute infarct, but did reveal Small focus of hyperattenuation in the expected area of the left anterior cerebral artery, question aneurysm. EKG revealed SR with First Degree AV Block.     Hospital Course:   1.10.23: NAD Noted. Vitals Stable. Progressing well. SR on Tele. Device Interrogation Reviewed.     PMH: CAD/CABG, Hypertension/HHD, ICMO, Hyperlipidemia, CVA  PSH: LHC, CABG, Knee Surgery, Back Surgery  Family History: Father- Heart Ailment, Mother- Hypertension, Sister- CVA/Hypertension  Social History: Tobacco- Active Smoker, Alcohol- Negative, Substance Abuse- Negative     Previous Cardiac Diagnostics:   Echocardiogram (1.9.23):  The left ventricle is mildly enlarged with concentric remodeling and severely decreased systolic function. The estimated ejection fraction is 35%. There is left ventricular global hypokinesis.  Grade I left ventricular diastolic dysfunction.  Normal right ventricular size with normal right ventricular systolic function.  Mild aortic regurgitation.  Mild mitral regurgitation.  The  estimated ejection fraction is 35%.     Coronary Angiogram (3.21.22):  LM- 30-40%, LAD- 30-40% Proximal to Mid Disease, LCX/OM- Patient with Mld Disease, RCA- Small with  of Distal Disease & Mid 50% Stenosis.  SVG to D1- Patent, LIMA to LAD- Atretic, SVG to OM- Occluded, SVG to RCA- Occluded.     Review of patient's allergies indicates:  No Known Allergies     Review of Systems   Cardiovascular:  Negative for chest pain and palpitations.   Respiratory:  Negative for shortness of breath.    All other systems reviewed and are negative.    Objective:     Vital Signs (Most Recent):  Temp: 98.1 °F (36.7 °C) (01/10/23 0726)  Pulse: 68 (01/10/23 0726)  Resp: 19 (01/10/23 0726)  BP: (!) 154/86 (01/10/23 0726)  SpO2: 97 % (01/10/23 0726)   Vital Signs (24h Range):  Temp:  [97.5 °F (36.4 °C)-98.1 °F (36.7 °C)] 98.1 °F (36.7 °C)  Pulse:  [63-89] 68  Resp:  [19] 19  SpO2:  [95 %-97 %] 97 %  BP: (133-154)/(84-92) 154/86     Weight: 83.9 kg (185 lb)  Body mass index is 26.54 kg/m².    SpO2: 97 %       No intake or output data in the 24 hours ending 01/10/23 0902    Lines/Drains/Airways       Peripheral Intravenous Line  Duration                  Peripheral IV - Single Lumen 01/08/23 2013 20 G Left;Posterior Wrist 1 day                    Significant Labs:   Recent Results (from the past 72 hour(s))   Comprehensive metabolic panel    Collection Time: 01/07/23  3:10 PM   Result Value Ref Range    Sodium Level 141 136 - 145 mmol/L    Potassium Level 4.1 3.5 - 5.1 mmol/L    Chloride 106 98 - 107 mmol/L    Carbon Dioxide 28 23 - 31 mmol/L    Glucose Level 105 82 - 115 mg/dL    Blood Urea Nitrogen 13.6 8.4 - 25.7 mg/dL    Creatinine 0.95 0.73 - 1.18 mg/dL    Calcium Level Total 9.2 8.8 - 10.0 mg/dL    Protein Total 6.3 5.8 - 7.6 gm/dL    Albumin Level 3.4 3.4 - 4.8 g/dL    Globulin 2.9 2.4 - 3.5 gm/dL    Albumin/Globulin Ratio 1.2 1.1 - 2.0 ratio    Bilirubin Total 0.3 <=1.5 mg/dL    Alkaline Phosphatase 67 40 - 150 unit/L     Alanine Aminotransferase 30 0 - 55 unit/L    Aspartate Aminotransferase 28 5 - 34 unit/L    eGFR 90 mls/min/1.73/m2   Brain natriuretic peptide    Collection Time: 01/07/23  3:10 PM   Result Value Ref Range    Natriuretic Peptide 101.1 (H) <=100.0 pg/mL   Troponin I    Collection Time: 01/07/23  3:10 PM   Result Value Ref Range    Troponin-I 0.048 (H) 0.000 - 0.045 ng/mL   APTT    Collection Time: 01/07/23  3:10 PM   Result Value Ref Range    PTT 25.1 23.2 - 33.7 seconds   Protime-INR    Collection Time: 01/07/23  3:10 PM   Result Value Ref Range    PT 12.9 12.5 - 14.5 seconds    INR 0.98 0.00 - 1.30   Lipase    Collection Time: 01/07/23  3:10 PM   Result Value Ref Range    Lipase Level 27 <=60 U/L   Lactic acid, plasma    Collection Time: 01/07/23  3:10 PM   Result Value Ref Range    Lactic Acid Level 1.6 0.5 - 2.2 mmol/L   Blood culture #1 **CANNOT BE ORDERED STAT**    Collection Time: 01/07/23  3:10 PM    Specimen: Blood   Result Value Ref Range    CULTURE, BLOOD ([facility]) No Growth At 48 Hours    Blood culture #2 **CANNOT BE ORDERED STAT**    Collection Time: 01/07/23  3:10 PM    Specimen: Blood   Result Value Ref Range    CULTURE, BLOOD ([facility]) No Growth At 48 Hours    CBC with Differential    Collection Time: 01/07/23  3:10 PM   Result Value Ref Range    WBC 8.1 4.5 - 11.5 x10(3)/mcL    RBC 5.31 4.70 - 6.10 x10(6)/mcL    Hgb 15.4 14.0 - 18.0 gm/dL    Hct 46.9 42.0 - 52.0 %    MCV 88.3 80.0 - 94.0 fL    MCH 29.0 pg    MCHC 32.8 (L) 33.0 - 36.0 mg/dL    RDW 14.1 11.6 - 14.4 %    Platelet 179 140 - 371 x10(3)/mcL    MPV 10.9 9.4 - 12.4 fL    Neut % 75.9 %    Lymph % 14.9 %    Mono % 8.4 %    Eos % 0.2 %    Basophil % 0.4 %    Lymph # 1.20 0.6 - 4.6 x10(3)/mcL    Neut # 6.10 2.1 - 9.2 x10(3)/mcL    Mono # 0.68 0.1 - 1.3 x10(3)/mcL    Eos # 0.02 0 - 0.9 x10(3)/mcL    Baso # 0.03 0 - 0.2 x10(3)/mcL    IG# 0.02 0 - 0.04 x10(3)/mcL    IG% 0.2 %    NRBC% 0.0 0 - 1 %   Ethanol    Collection Time: 01/07/23  3:10 PM   Result  Value Ref Range    Ethanol Level <10.0 <=10.0 mg/dL   COVID/FLU A&B PCR    Collection Time: 01/07/23  3:53 PM   Result Value Ref Range    Influenza A PCR Not Detected Not Detected    Influenza B PCR Not Detected Not Detected    SARS-CoV-2 PCR Not Detected Not Detected   Urinalysis, Reflex to Urine Culture Urine, Clean Catch    Collection Time: 01/07/23  4:19 PM    Specimen: Urine   Result Value Ref Range    Color, UA Yellow Yellow, Light-Yellow, Dark Yellow, [place], Straw    Appearance, UA Clear Clear    Specific [place], UA 1.011 1.001 - 1.030    pH, UA 7.0 5.0 - 8.5    Protein, UA Trace (A) Negative mg/dL    Glucose, UA Negative Negative, Normal mg/dL    Ketones, UA Negative Negative mg/dL    Blood, UA Negative Negative unit/L    Bilirubin, UA Negative Negative mg/dL    Urobilinogen, UA 0.2 0.2, 1.0, Normal mg/dL    Nitrites, UA Negative Negative    Leukocyte Esterase, UA Negative Negative unit/L   Urinalysis, Microscopic    Collection Time: 01/07/23  4:19 PM   Result Value Ref Range    RBC, UA <5 <=5 /HPF    WBC, UA <5 <=5 /HPF    Squamous Epithelial Cells, UA <5 <=5 /HPF    Bacteria, UA None Seen None Seen, Rare, Occasional /HPF   Drug Screen, Urine    Collection Time: 01/07/23  6:12 PM   Result Value Ref Range    Amphetamines, Urine Negative Negative    Barbituates, Urine Negative Negative    Benzodiazepine, Urine Negative Negative    Cannabinoids, Urine Negative Negative    Cocaine, Urine Negative Negative    Fentanyl, Urine Negative Negative    MDMA, Urine Negative Negative    Opiates, Urine Negative Negative    Phencyclidine, Urine Negative Negative    pH, Urine 7.0 3.0 - 11.0   Troponin I    Collection Time: 01/08/23 12:32 AM   Result Value Ref Range    Troponin-I 0.047 (H) 0.000 - 0.045 ng/mL   Troponin I    Collection Time: 01/08/23  6:01 AM   Result Value Ref Range    Troponin-I 0.048 (H) 0.000 - 0.045 ng/mL   Comprehensive metabolic panel    Collection Time: 01/08/23  6:01 AM   Result Value Ref Range     Sodium Level 140 136 - 145 mmol/L    Potassium Level 4.6 3.5 - 5.1 mmol/L    Chloride 105 98 - 107 mmol/L    Carbon Dioxide 27 23 - 31 mmol/L    Glucose Level 87 82 - 115 mg/dL    Blood Urea Nitrogen 10.0 8.4 - 25.7 mg/dL    Creatinine 0.84 0.73 - 1.18 mg/dL    Calcium Level Total 9.3 8.8 - 10.0 mg/dL    Protein Total 6.6 5.8 - 7.6 gm/dL    Albumin Level 3.6 3.4 - 4.8 g/dL    Globulin 3.0 2.4 - 3.5 gm/dL    Albumin/Globulin Ratio 1.2 1.1 - 2.0 ratio    Bilirubin Total 0.7 <=1.5 mg/dL    Alkaline Phosphatase 72 40 - 150 unit/L    Alanine Aminotransferase 29 0 - 55 unit/L    Aspartate Aminotransferase 29 5 - 34 unit/L    eGFR >90 mls/min/1.73/m2   CBC with Differential    Collection Time: 01/08/23  6:01 AM   Result Value Ref Range    WBC 5.4 4.5 - 11.5 x10(3)/mcL    RBC 5.56 4.70 - 6.10 x10(6)/mcL    Hgb 15.9 14.0 - 18.0 gm/dL    Hct 49.2 42.0 - 52.0 %    MCV 88.5 80.0 - 94.0 fL    MCH 28.6 pg    MCHC 32.3 (L) 33.0 - 36.0 mg/dL    RDW 14.1 11.6 - 14.4 %    Platelet 181 140 - 371 x10(3)/mcL    MPV 11.0 9.4 - 12.4 fL    Neut % 57.9 %    Lymph % 31.5 %    Mono % 9.1 %    Eos % 0.7 %    Basophil % 0.6 %    Lymph # 1.69 0.6 - 4.6 x10(3)/mcL    Neut # 3.10 2.1 - 9.2 x10(3)/mcL    Mono # 0.49 0.1 - 1.3 x10(3)/mcL    Eos # 0.04 0 - 0.9 x10(3)/mcL    Baso # 0.03 0 - 0.2 x10(3)/mcL    IG# 0.01 0 - 0.04 x10(3)/mcL    IG% 0.2 %    NRBC% 0.0 0 - 1 %   Echo    Collection Time: 01/09/23 12:36 PM   Result Value Ref Range    BSA 2.04 m2    TDI SEPTAL 0.06 m/s    LV LATERAL E/E' RATIO 6.78 m/s    LV SEPTAL E/E' RATIO 10.17 m/s    EF 35 %    Left Ventricular Outflow Tract Mean Velocity 0.54 cm/s    Left Ventricular Outflow Tract Mean Gradient 1.00 mmHg    TDI LATERAL 0.09 m/s    PV PEAK VELOCITY 1.08 cm/s    LVIDd 5.01 3.5 - 6.0 cm    IVS 1.19 (A) 0.6 - 1.1 cm    Posterior Wall 1.15 (A) 0.6 - 1.1 cm    LVIDs 3.89 2.1 - 4.0 cm    FS 22 28 - 44 %    LV mass 226.33 g    LA size 4.00 cm    RVDD 3.85 cm    TAPSE 1.07 cm    Left Ventricle  Relative Wall Thickness 0.46 cm    AV mean gradient 4 mmHg    AV Velocity Ratio 0.65     AV index (prosthetic) 0.59     E/A ratio 0.74     Mean e' 0.08 m/s    LVOT peak koby 0.85 m/s    LVOT peak VTI 14.20 cm    Ao peak koby 1.31 m/s    Ao VTI 24.1 cm    AV peak gradient 7 mmHg    E/E' ratio 8.13 m/s    MV Peak E Koby 0.61 m/s    MV Peak A Koby 0.82 m/s    LV Systolic Volume 65.50 mL    LV Systolic Volume Index 32.4 mL/m2    LV Diastolic Volume 119.00 mL    LV Diastolic Volume Index 58.91 mL/m2    LV Mass Index 112 g/m2    LA Volume Index (Mod) 34.1 mL/m2    LA volume (mod) 68.90 cm3    Right Atrial Pressure (from IVC) 8 mmHg   Basic Metabolic Panel    Collection Time: 01/10/23  4:33 AM   Result Value Ref Range    Sodium Level 139 136 - 145 mmol/L    Potassium Level 4.3 3.5 - 5.1 mmol/L    Chloride 105 98 - 107 mmol/L    Carbon Dioxide 27 23 - 31 mmol/L    Glucose Level 95 82 - 115 mg/dL    Blood Urea Nitrogen 17.1 8.4 - 25.7 mg/dL    Creatinine 0.85 0.73 - 1.18 mg/dL    BUN/Creatinine Ratio 20     Calcium Level Total 9.2 8.8 - 10.0 mg/dL    Anion Gap 7.0 mEq/L    eGFR >60 mls/min/1.73/m2   CBC with Differential    Collection Time: 01/10/23  4:33 AM   Result Value Ref Range    WBC 5.2 4.5 - 11.5 x10(3)/mcL    RBC 5.35 4.70 - 6.10 x10(6)/mcL    Hgb 15.5 14.0 - 18.0 gm/dL    Hct 46.7 42.0 - 52.0 %    MCV 87.3 80.0 - 94.0 fL    MCH 29.0 pg    MCHC 33.2 33.0 - 36.0 mg/dL    RDW 14.1 11.5 - 17.0 %    Platelet 203 130 - 400 x10(3)/mcL    MPV 11.2 (H) 7.4 - 10.4 fL    Neut % 49.7 %    Lymph % 37.4 %    Mono % 11.5 %    Eos % 0.8 %    Basophil % 0.4 %    Lymph # 1.95 0.6 - 4.6 x10(3)/mcL    Neut # 2.60 2.1 - 9.2 x10(3)/mcL    Mono # 0.60 0.1 - 1.3 x10(3)/mcL    Eos # 0.04 0 - 0.9 x10(3)/mcL    Baso # 0.02 0 - 0.2 x10(3)/mcL    IG# 0.01 0 - 0.04 x10(3)/mcL    IG% 0.2 %    NRBC% 0.0 %   Magnesium    Collection Time: 01/10/23  4:33 AM   Result Value Ref Range    Magnesium Level 1.90 1.60 - 2.60 mg/dL       Telemetry:  Sinus  Rhythm    Physical Exam  Vitals reviewed.   Constitutional:       Appearance: Normal appearance.   HENT:      Head: Normocephalic.      Mouth/Throat:      Mouth: Mucous membranes are moist.      Pharynx: Oropharynx is clear.   Cardiovascular:      Rate and Rhythm: Normal rate and regular rhythm.      Comments: Sinus Rhythm  Pulmonary:      Effort: Pulmonary effort is normal. No respiratory distress.      Breath sounds: Normal breath sounds.   Abdominal:      General: There is no distension.      Palpations: Abdomen is soft.      Tenderness: There is no abdominal tenderness.   Musculoskeletal:      Cervical back: Neck supple.   Skin:     General: Skin is warm and dry.   Neurological:      General: No focal deficit present.      Mental Status: He is alert. Mental status is at baseline.   Psychiatric:         Behavior: Behavior normal.       Current Inpatient Medications:    Current Facility-Administered Medications:     aspirin EC tablet 81 mg, 81 mg, Oral, Daily, Manuel Guzman MD, 81 mg at 01/09/23 0839    atorvastatin tablet 10 mg, 10 mg, Oral, Daily, Manuel Guzman MD, 10 mg at 01/09/23 0839    carvediloL tablet 12.5 mg, 12.5 mg, Oral, BID, TELMA Macdonald, 12.5 mg at 01/09/23 2042    clopidogreL tablet 75 mg, 75 mg, Oral, Daily, Manuel Guzman MD, 75 mg at 01/09/23 0840    enoxaparin injection 40 mg, 40 mg, Subcutaneous, Daily, Manuel Guzman MD, 40 mg at 01/09/23 1637    melatonin tablet 6 mg, 6 mg, Oral, Nightly PRN, Manuel Guzman MD    nicotine 21 mg/24 hr 1 patch, 1 patch, Transdermal, Daily, Carmen Barr MD, 1 patch at 01/09/23 2042    pantoprazole EC tablet 40 mg, 40 mg, Oral, Daily, TELMA Macdonald    sodium chloride 0.9% flush 10 mL, 10 mL, Intravenous, PRN, Manuel Guzman MD    VTE Risk Mitigation (From admission, onward)           Ordered     enoxaparin injection 40 mg  Daily         01/08/23 0020     IP VTE HIGH RISK PATIENT  Once         01/08/23 0020      Place sequential compression device  Until discontinued         01/08/23 0020                  Assessment:   Altered Mental Status due to Suspected Polypharmacy- Appears Back to Baseline    - Drug Toxicology Negative  Hypotension (Resolved with IV Fluid Resuscitation)- Now Hypertensive    - History of Hypertension  Elevated Troponin- in the Setting of Altered Mental Status, Do not Suspect ACS    - Peak 0.04 Flat Trend  CAD/CABG    - SVG to D1- Patent, LIMA to LAD- Atretic, SVG to OM- Occluded, SVG to RCA- Occluded (RCA Distal  with 50% Mid) (3.21.22)  Ischemic Cardiomyopathy    - EF 35%    - Grade I Diastolic Dysfunction    - Status Post ICD ([employer])- Normal Device Function with NSVT  Left anterior cerebral artery aneurysm measuring up to 6 mm.  History of CVA  Hyperlipidemia  Nicotine Dependence    Plan:   Increase Coreg back to 25 Mg PO BID  Continue Current Cardiac Medications  No Evidence of AF on EKG or Tele.   Resume Entresto 24/26 Mg PO BID Prior to discharge  Follow up with [facility]  Will be available. Call if needed.    TELMA Macdonald  Cardiology  [facility] - 4th Floor Medical Telemetry  01/10/2023

## 2023-01-10 NOTE — PLAN OF CARE
Problem: Physical Therapy  Goal: Physical Therapy Goal  Description: Goals to be met by: 02/10/23     Patient will increase functional independence with mobility by performin. Gait  x 200 feet with Modified [place] using Rolling Walker vs none.     Outcome: Ongoing, Progressing

## 2023-01-11 VITALS
BODY MASS INDEX: 26.48 KG/M2 | DIASTOLIC BLOOD PRESSURE: 83 MMHG | HEART RATE: 79 BPM | SYSTOLIC BLOOD PRESSURE: 127 MMHG | HEIGHT: 70 IN | OXYGEN SATURATION: 95 % | RESPIRATION RATE: 20 BRPM | TEMPERATURE: 99 F | WEIGHT: 185 LBS

## 2023-01-11 PROBLEM — R41.82 AMS (ALTERED MENTAL STATUS): Status: RESOLVED | Noted: 2023-01-08 | Resolved: 2023-01-11

## 2023-01-11 LAB
ALBUMIN SERPL-MCNC: 3.6 G/DL (ref 3.4–4.8)
ALBUMIN/GLOB SERPL: 1 RATIO (ref 1.1–2)
ALP SERPL-CCNC: 65 UNIT/L (ref 40–150)
ALT SERPL-CCNC: 48 UNIT/L (ref 0–55)
AST SERPL-CCNC: 45 UNIT/L (ref 5–34)
BASOPHILS # BLD AUTO: 0.04 X10(3)/MCL (ref 0–0.2)
BASOPHILS NFR BLD AUTO: 0.5 %
BILIRUBIN DIRECT+TOT PNL SERPL-MCNC: 0.9 MG/DL
BUN SERPL-MCNC: 16.1 MG/DL (ref 8.4–25.7)
CALCIUM SERPL-MCNC: 9.2 MG/DL (ref 8.8–10)
CHLORIDE SERPL-SCNC: 110 MMOL/L (ref 98–107)
CO2 SERPL-SCNC: 19 MMOL/L (ref 23–31)
CREAT SERPL-MCNC: 0.73 MG/DL (ref 0.73–1.18)
DEPRECATED CALCIDIOL+CALCIFEROL SERPL-MC: 25.1 NG/ML (ref 30–80)
EOSINOPHIL # BLD AUTO: 0.02 X10(3)/MCL (ref 0–0.9)
EOSINOPHIL NFR BLD AUTO: 0.2 %
ERYTHROCYTE [DISTWIDTH] IN BLOOD BY AUTOMATED COUNT: 13.9 % (ref 11.5–17)
GFR SERPLBLD CREATININE-BSD FMLA CKD-EPI: >60 MLS/MIN/1.73/M2
GLOBULIN SER-MCNC: 3.6 GM/DL (ref 2.4–3.5)
GLUCOSE SERPL-MCNC: 112 MG/DL (ref 82–115)
HCT VFR BLD AUTO: 47.5 % (ref 42–52)
HGB BLD-MCNC: 15.8 GM/DL (ref 14–18)
IMM GRANULOCYTES # BLD AUTO: 0.03 X10(3)/MCL (ref 0–0.04)
IMM GRANULOCYTES NFR BLD AUTO: 0.4 %
LYMPHOCYTES # BLD AUTO: 1.65 X10(3)/MCL (ref 0.6–4.6)
LYMPHOCYTES NFR BLD AUTO: 19.4 %
MAGNESIUM SERPL-MCNC: 1.9 MG/DL (ref 1.6–2.6)
MCH RBC QN AUTO: 28.6 PG
MCHC RBC AUTO-ENTMCNC: 33.3 MG/DL (ref 33–36)
MCV RBC AUTO: 85.9 FL (ref 80–94)
MONOCYTES # BLD AUTO: 0.68 X10(3)/MCL (ref 0.1–1.3)
MONOCYTES NFR BLD AUTO: 8 %
NEUTROPHILS # BLD AUTO: 6.07 X10(3)/MCL (ref 2.1–9.2)
NEUTROPHILS NFR BLD AUTO: 71.5 %
NRBC BLD AUTO-RTO: 0 %
PLATELET # BLD AUTO: 189 X10(3)/MCL (ref 130–400)
PMV BLD AUTO: 11.1 FL (ref 7.4–10.4)
POTASSIUM SERPL-SCNC: 4.1 MMOL/L (ref 3.5–5.1)
PROT SERPL-MCNC: 7.2 GM/DL (ref 5.8–7.6)
RBC # BLD AUTO: 5.53 X10(6)/MCL (ref 4.7–6.1)
SODIUM SERPL-SCNC: 138 MMOL/L (ref 136–145)
WBC # SPEC AUTO: 8.5 X10(3)/MCL (ref 4.5–11.5)

## 2023-01-11 PROCEDURE — 85025 COMPLETE CBC W/AUTO DIFF WBC: CPT | Performed by: INTERNAL MEDICINE

## 2023-01-11 PROCEDURE — 36415 COLL VENOUS BLD VENIPUNCTURE: CPT | Performed by: INTERNAL MEDICINE

## 2023-01-11 PROCEDURE — 83735 ASSAY OF MAGNESIUM: CPT | Performed by: INTERNAL MEDICINE

## 2023-01-11 PROCEDURE — 82306 VITAMIN D 25 HYDROXY: CPT | Performed by: INTERNAL MEDICINE

## 2023-01-11 PROCEDURE — 80053 COMPREHEN METABOLIC PANEL: CPT | Performed by: INTERNAL MEDICINE

## 2023-01-11 PROCEDURE — G0378 HOSPITAL OBSERVATION PER HR: HCPCS

## 2023-01-11 PROCEDURE — 97530 THERAPEUTIC ACTIVITIES: CPT | Mod: CQ

## 2023-01-11 PROCEDURE — 97116 GAIT TRAINING THERAPY: CPT | Mod: CQ

## 2023-01-11 PROCEDURE — 25000003 PHARM REV CODE 250: Performed by: NURSE PRACTITIONER

## 2023-01-11 RX ORDER — CALCIUM CARBONATE 200(500)MG
500 TABLET,CHEWABLE ORAL 3 TIMES DAILY PRN
Status: DISCONTINUED | OUTPATIENT
Start: 2023-01-11 | End: 2023-01-11 | Stop reason: HOSPADM

## 2023-01-11 RX ORDER — ERGOCALCIFEROL 1.25 MG/1
50000 CAPSULE ORAL
Qty: 12 CAPSULE | Refills: 0 | Status: SHIPPED | OUTPATIENT
Start: 2023-01-18 | End: 2023-04-06

## 2023-01-11 RX ORDER — ERGOCALCIFEROL 1.25 MG/1
50000 CAPSULE ORAL
Status: DISCONTINUED | OUTPATIENT
Start: 2023-01-11 | End: 2023-01-11 | Stop reason: HOSPADM

## 2023-01-11 RX ORDER — CARVEDILOL 25 MG/1
25 TABLET ORAL 2 TIMES DAILY
Qty: 120 TABLET | Refills: 0 | Status: SHIPPED | OUTPATIENT
Start: 2023-01-11 | End: 2023-01-23

## 2023-01-11 RX ORDER — PANTOPRAZOLE SODIUM 40 MG/1
40 TABLET, DELAYED RELEASE ORAL DAILY
Qty: 60 TABLET | Refills: 0 | Status: SHIPPED | OUTPATIENT
Start: 2023-01-12 | End: 2023-03-13

## 2023-01-11 RX ADMIN — CALCIUM CARBONATE (ANTACID) CHEW TAB 500 MG 500 MG: 500 CHEW TAB at 05:01

## 2023-01-11 NOTE — PT/OT/SLP PROGRESS
Physical Therapy         Treatment        Marino Mccrary   MRN: [id number]     PT Received On: 01/11/23  PT Start Time: 1000     PT Stop Time: 1025    PT Total Time (min): 25 min       Billable Minutes:  Gait [id number] and Therapeutic Activity 15  Total Minutes: 25    Treatment Type: Treatment  PT/PTA: PTA     PTA Visit Number: 1       General Precautions: Standard, fall  Orthopedic Precautions: Orthopedic Precautions : N/A   Braces:      Spiritual, Cultural Beliefs, Faith Practices, Values that Affect Care: no    Objective:  Patient found in bed upon entry.    Functional Mobility:  Bed Mobility:   Supine to sit: Modified Independent   Sit to supine: Modified Independent   Rolling: Activity did not occur   Scooting: Modified Independent    Balance:     Transitional Sit-to-stand: SBA      Gait Training: SBA  Pt amb 300ft with step through gait pattern. Pt presents with LLE circumduction with decreased L hip and knee flexion. Pt also presents with lateral excursion but had no LOB.     Additional Treatment:    Strengthening/Balance   Pt performed forward step ups with LLE on 4inch step 2 X 10 reps     Pt performed eccentric step down of LLE on 4inch step 2 X 10 reps     Pt performed L lateral toe taps on 4inch step 2 X 10 reps.     Activity Tolerance:  Patient tolerated treatment well    Patient left HOB elevated with call button in reach.    Assessment:  Marino Mccrary is a 62 y.o. male with a medical diagnosis of AMS (altered mental status).     Rehab potential is excellent.    Activity tolerance: Excellent    Discharge recommendations: Discharge Facility/Level of Care Needs: home health PT     Equipment recommendations:       GOALS:   Multidisciplinary Problems       Physical Therapy Goals          Problem: Physical Therapy    Goal Priority Disciplines Outcome Goal Variances Interventions   Physical Therapy Goal     PT, PT/OT Ongoing, Progressing     Description: Goals to be met by: 02/10/23     Patient  will increase functional independence with mobility by performin. Gait  x 200 feet with Modified [place] using Rolling Walker vs none.                          PLAN:    Patient to be seen 5 x/week  to address the above listed problems via gait training, therapeutic exercises  Plan of Care expires: 02/10/23  Plan of Care reviewed with: patient         2023

## 2023-01-11 NOTE — PLAN OF CARE
Problem: Adult Inpatient Plan of Care  Goal: Plan of Care Review  Outcome: Adequate for Care Transition  Goal: Patient-Specific Goal (Individualized)  Outcome: Adequate for Care Transition  Goal: Absence of Hospital-Acquired Illness or Injury  Outcome: Adequate for Care Transition  Goal: Optimal Comfort and Wellbeing  Outcome: Adequate for Care Transition  Goal: Readiness for Transition of Care  Outcome: Adequate for Care Transition     Problem: Fall Injury Risk  Goal: Absence of Fall and Fall-Related Injury  Outcome: Adequate for Care Transition     Problem: Skin Injury Risk Increased  Goal: Skin Health and Integrity  Outcome: Adequate for Care Transition

## 2023-01-11 NOTE — DISCHARGE SUMMARY
[facility] - 4th Floor [facility] Medicine  Discharge Summary      Patient Name: Marino Mccrary  MRN: [id number]  [facility]: [id number]  Patient Class: OP- Observation  Admission Date: 1/7/2023  Hospital Length of Stay: 0 days  Discharge Date and Time:  01/11/2023 9:42 AM  Attending Physician: Manuel Guzman MD   Discharging Provider: Bobby Martinez MD  Primary Care Provider: TERESSA Andrews MD    Primary Care Team: Networked reference to record PCT      62-year-old male with a past medical history of CAD/MI, CVA with residual left-sided weakness, hypertension who was brought to the ER because family members found him lethargic.  He was confused and hypotensive on arrival and was given normal saline with improvement in his blood pressure.  Lab work was completely unremarkable except for a very minimally elevated troponin of 0.048.  EKG showed no acute ST elevations or depressions.  CT head was unremarkable and CTA of the head and neck noted a 6 mm cerebral aneurysm for which Interventional Neurology recommended outpatient follow-up.      Cardiology was consulted for further input.  There was no concern for atrial fibrillation on telemetry as per Cardiology.  Coreg was resumed to home dose and telemetry was continued while inpatient.  Hospital stay was complicated with mechanical fall with no injuries.  Cardiology recommended against any anticoagulation. TTE showed EF 30% with left ventricular global hypokinesis, grade 1 diastolic dysfunction, mild AR, mild MR.  Mental status has resolved to baseline and he tolerated PT well.  Will be discharged to home today with home health.  All medications were reconciled.      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  Mechanical fall while in the hospital      Hx CAD/CABG, multiple CVAs w/ resilual  left sided weakness, HTN, HLD      Goals of Care Treatment Preferences:  Code Status: Full Code    Vitals:    01/11/23 1108   BP: 127/83   Pulse: 79   Resp:    Temp: 98.6 °F (37 °C)    General: In no acute distress, afebrile  Respiratory: Clear to auscultation bilaterally  Cardiovascular: S1, S2, no appreciable murmur  Abdomen: Soft, nontender, BS +  MSK: Warm, no lower extremity edema, no clubbing or cyanosis  Neurologic: Alert and oriented x4, moving all extremities with good strength         Consults:   Consults (From admission, onward)          Status Ordering Provider     Inpatient consult to Cardiology  Once        Provider:  Rolo Kimball MD    Completed ALE CONNOLLY     IP CONSULT TO NEURO-INTERVENTIONAL  Once        Provider:  Shahzad King MD    Acknowledged ALE CONNOLLY            No new Assessment & Plan notes have been filed under this hospital service since the last note was generated.  Service: Hospital Medicine    Final Active Diagnoses:      Problems Resolved During this Admission:    Diagnosis Date Noted Date Resolved POA    PRINCIPAL PROBLEM:  AMS (altered mental status) [R41.82] 01/08/2023 01/11/2023 Yes       Discharged Condition: good    [place] [facility] [place]:   Follow-up Information       Shahzad King MD. Go to.    Specialties: Neurology, Interventional Neurology  Why: January 23rd @1pm  Contact information:  [address] [place] [address]  [address]  [facility] [address]  [phone]               Jayce Ramires MD. Go on 1/20/2023.    Specialty: Cardiology  Why: Hospital Follow Up on Jan 20 at 1:30pm  Contact information:  [address] [place] [address]  [address]  [facility] [address]  [phone]                           Patient Instructions:   No discharge procedures on file.    Significant Diagnostic Studies: Labs: CMP   Recent Labs   Lab 01/10/23  0433 01/11/23  0421    138   K 4.3 4.1   CO2 27 19*   BUN 17.1 16.1   CREATININE 0.85 0.73   CALCIUM  9.2 9.2   ALBUMIN  --  3.6   BILITOT  --  0.9   ALKPHOS  --  65   AST  --  45*   ALT  --  48       Pending Diagnostic Studies:       None           Medications:  Reconciled Home Medications:      Medication List        START taking these medications      carvediloL 25 MG tablet  Commonly known as: COREG  Take 1 tablet (25 mg total) by mouth 2 (two) times daily.     ergocalciferol 50,000 unit Cap  Commonly known as: ERGOCALCIFEROL  Take 1 capsule (50,000 Units total) by mouth every 7 days. for 12 doses  Start taking on: January 18, 2023     pantoprazole 40 MG tablet  Commonly known as: PROTONIX  Take 1 tablet (40 mg total) by mouth once daily.  Start taking on: January 12, 2023            CONTINUE taking these medications      amitriptyline 50 MG tablet  Commonly known as: ELAVIL  Take 25 mg by mouth every evening.     aspirin 81 MG EC tablet  Commonly known as: ECOTRIN  Take 81 mg by mouth once daily.     atorvastatin 10 MG tablet  Commonly known as: LIPITOR  Take 10 mg by mouth once daily.     clopidogreL 75 mg tablet  Commonly known as: PLAVIX  Take 75 mg by mouth once daily.     sacubitriL-valsartan 24-26 mg per tablet  Commonly known as: ENTRESTO  Take 1 tablet by mouth 2 (two) times daily.     scopolamine 1.3-1.5 mg (1 mg over 3 days)  Commonly known as: TRANSDERM-SCOP  Place 1 patch onto the skin every 72 hours.            STOP taking these medications      lisinopriL 2.5 MG tablet  Commonly known as: PRINIVIL,ZESTRIL              Indwelling Lines/Drains at time of discharge:   Lines/Drains/Airways       None                   Time spent on the discharge of patient: 35 minutes         Bobby Martinez MD  Department of Hospital Medicine  [facility] - 4th Floor Medical Telemetry

## 2023-01-12 LAB
BACTERIA BLD CULT: NORMAL
BACTERIA BLD CULT: NORMAL

## 2023-01-23 ENCOUNTER — OFFICE VISIT (OUTPATIENT)
Dept: NEUROLOGY | Facility: CLINIC | Age: 63
End: 2023-01-23
Payer: MEDICAID

## 2023-01-23 VITALS
DIASTOLIC BLOOD PRESSURE: 86 MMHG | BODY MASS INDEX: 26.48 KG/M2 | SYSTOLIC BLOOD PRESSURE: 128 MMHG | WEIGHT: 185 LBS | HEIGHT: 70 IN

## 2023-01-23 DIAGNOSIS — I67.1 CEREBRAL ANEURYSM: Primary | ICD-10-CM

## 2023-01-23 PROCEDURE — 1159F PR MEDICATION LIST DOCUMENTED IN MEDICAL RECORD: ICD-10-PCS | Mod: CPTII,,, | Performed by: PSYCHIATRY & NEUROLOGY

## 2023-01-23 PROCEDURE — 99214 OFFICE O/P EST MOD 30 MIN: CPT | Mod: S$PBB,,, | Performed by: PSYCHIATRY & NEUROLOGY

## 2023-01-23 PROCEDURE — 3074F SYST BP LT 130 MM HG: CPT | Mod: CPTII,,, | Performed by: PSYCHIATRY & NEUROLOGY

## 2023-01-23 PROCEDURE — 4010F ACE/ARB THERAPY RXD/TAKEN: CPT | Mod: CPTII,,, | Performed by: PSYCHIATRY & NEUROLOGY

## 2023-01-23 PROCEDURE — 99999 PR PBB SHADOW E&M-EST. PATIENT-LVL III: CPT | Mod: PBBFAC,,, | Performed by: PSYCHIATRY & NEUROLOGY

## 2023-01-23 PROCEDURE — 3008F BODY MASS INDEX DOCD: CPT | Mod: CPTII,,, | Performed by: PSYCHIATRY & NEUROLOGY

## 2023-01-23 PROCEDURE — 4010F PR ACE/ARB THEARPY RXD/TAKEN: ICD-10-PCS | Mod: CPTII,,, | Performed by: PSYCHIATRY & NEUROLOGY

## 2023-01-23 PROCEDURE — 3074F PR MOST RECENT SYSTOLIC BLOOD PRESSURE < 130 MM HG: ICD-10-PCS | Mod: CPTII,,, | Performed by: PSYCHIATRY & NEUROLOGY

## 2023-01-23 PROCEDURE — 99213 OFFICE O/P EST LOW 20 MIN: CPT | Mod: PBBFAC | Performed by: PSYCHIATRY & NEUROLOGY

## 2023-01-23 PROCEDURE — 3079F PR MOST RECENT DIASTOLIC BLOOD PRESSURE 80-89 MM HG: ICD-10-PCS | Mod: CPTII,,, | Performed by: PSYCHIATRY & NEUROLOGY

## 2023-01-23 PROCEDURE — 1159F MED LIST DOCD IN RCRD: CPT | Mod: CPTII,,, | Performed by: PSYCHIATRY & NEUROLOGY

## 2023-01-23 PROCEDURE — 99214 PR OFFICE/OUTPT VISIT, EST, LEVL IV, 30-39 MIN: ICD-10-PCS | Mod: S$PBB,,, | Performed by: PSYCHIATRY & NEUROLOGY

## 2023-01-23 PROCEDURE — 99999 PR PBB SHADOW E&M-EST. PATIENT-LVL III: ICD-10-PCS | Mod: PBBFAC,,, | Performed by: PSYCHIATRY & NEUROLOGY

## 2023-01-23 PROCEDURE — 3008F PR BODY MASS INDEX (BMI) DOCUMENTED: ICD-10-PCS | Mod: CPTII,,, | Performed by: PSYCHIATRY & NEUROLOGY

## 2023-01-23 PROCEDURE — 3079F DIAST BP 80-89 MM HG: CPT | Mod: CPTII,,, | Performed by: PSYCHIATRY & NEUROLOGY

## 2023-01-23 RX ORDER — LOSARTAN POTASSIUM 50 MG/1
50 TABLET ORAL
COMMUNITY
Start: 2023-01-17 | End: 2023-01-23

## 2023-01-23 RX ORDER — CLINDAMYCIN HYDROCHLORIDE 150 MG/1
CAPSULE ORAL
Status: ON HOLD | COMMUNITY
Start: 2023-01-14 | End: 2023-04-28

## 2023-01-23 RX ORDER — METOPROLOL SUCCINATE 100 MG/1
100 TABLET, EXTENDED RELEASE ORAL
Status: ON HOLD | COMMUNITY
Start: 2023-01-20 | End: 2023-05-15 | Stop reason: HOSPADM

## 2023-01-23 RX ORDER — FLUTICASONE PROPIONATE 50 MCG
1 SPRAY, SUSPENSION (ML) NASAL
Status: ON HOLD | COMMUNITY
End: 2023-04-28

## 2023-01-23 NOTE — PROGRESS NOTES
Neurology Office Visit  Neurology    Marino Mccrary is a 62 y.o. male for [facility] f/u.  Was brought to the ER because family members found him lethargic.  He was confused and hypotensive on arrival and was given normal saline with improvement in his blood pressure.  Lab work was completely unremarkable except for a very minimally elevated troponin of 0.048.  EKG showed no acute ST elevations or depressions.  CT head was unremarkable and CTA of the head and neck noted a 6 mm L AMBER aneurysm.    ROS:  Review of Systems   All other systems reviewed and are negative.     Past Medical History:   Diagnosis Date    Acid reflux     Cerebral aneurysm     Heart disease     Hypertension     Mixed hyperlipidemia      Past Surgical History:   Procedure Laterality Date    BACK SURGERY      CARDIAC SURGERY      KNEE SURGERY       Family History   Family history unknown: Yes     Review of patient's allergies indicates:  No Known Allergies    Current Outpatient Medications:     amitriptyline (ELAVIL) 50 MG tablet, Take 25 mg by mouth every evening., Disp: , Rfl:     aspirin (ECOTRIN) 81 MG EC tablet, Take 81 mg by mouth once daily., Disp: , Rfl:     atorvastatin (LIPITOR) 10 MG tablet, Take 10 mg by mouth once daily., Disp: , Rfl:     clindamycin (CLEOCIN) 150 MG capsule, SMARTSIG:3 Capsule(s) By Mouth Every 8 Hours, Disp: , Rfl:     clopidogreL (PLAVIX) 75 mg tablet, Take 75 mg by mouth once daily., Disp: , Rfl:     ergocalciferol (ERGOCALCIFEROL) 50,000 unit Cap, Take 1 capsule (50,000 Units total) by mouth every 7 days. for 12 doses, Disp: 12 capsule, Rfl: 0    fluticasone propionate (FLONASE) 50 mcg/actuation nasal spray, 1 spray by Each Nostril route., Disp: , Rfl:     metoprolol succinate (TOPROL-XL) 50 MG 24 hr tablet, Take 50 mg by mouth., Disp: , Rfl:     pantoprazole (PROTONIX) 40 MG tablet, Take 1 tablet (40 mg total) by mouth once daily., Disp: 60 tablet, Rfl: 0    sacubitriL-valsartan (ENTRESTO) 24-26 mg per tablet, Take  1 tablet by mouth 2 (two) times daily., Disp: , Rfl:     scopolamine (TRANSDERM-SCOP) 1.3-1.5 mg (1 mg over 3 days), Place 1 patch onto the skin every 72 hours., Disp: , Rfl:   Outpatient Medications Marked as Taking for the 1/23/23 encounter (Office Visit) with Shahzad King MD   Medication Sig Dispense Refill    amitriptyline (ELAVIL) 50 MG tablet Take 25 mg by mouth every evening.      aspirin (ECOTRIN) 81 MG EC tablet Take 81 mg by mouth once daily.      atorvastatin (LIPITOR) 10 MG tablet Take 10 mg by mouth once daily.      clindamycin (CLEOCIN) 150 MG capsule SMARTSIG:3 Capsule(s) By Mouth Every 8 Hours      clopidogreL (PLAVIX) 75 mg tablet Take 75 mg by mouth once daily.      ergocalciferol (ERGOCALCIFEROL) 50,000 unit Cap Take 1 capsule (50,000 Units total) by mouth every 7 days. for 12 doses 12 capsule 0    fluticasone propionate (FLONASE) 50 mcg/actuation nasal spray 1 spray by Each Nostril route.      metoprolol succinate (TOPROL-XL) 50 MG 24 hr tablet Take 50 mg by mouth.      pantoprazole (PROTONIX) 40 MG tablet Take 1 tablet (40 mg total) by mouth once daily. 60 tablet 0    sacubitriL-valsartan (ENTRESTO) 24-26 mg per tablet Take 1 tablet by mouth 2 (two) times daily.      scopolamine (TRANSDERM-SCOP) 1.3-1.5 mg (1 mg over 3 days) Place 1 patch onto the skin every 72 hours.      [DISCONTINUED] carvediloL (COREG) 25 MG tablet Take 1 tablet (25 mg total) by mouth 2 (two) times daily. 120 tablet 0     Social History     Tobacco Use    Smoking status: Every Day     Packs/day: 1.00     Types: Cigarettes    Smokeless tobacco: Never   Substance Use Topics    Alcohol use: Yes     Comment: 1/2 pint of whiskey every month    Drug use: Never         Vitals:    01/23/23 1342   BP: 128/86     Gen NAD  HEENT NC/AT  CV RRR, no carotid bruits  Resp clear  GI soft  Ext edema and erythema left hand (currently on clinda for this)  Neuro  AAOx4  Speech fluent, appropriate  EOMI, PERRLA, VFF  LUMN facial  droop  Tongue and palate midline  Motor LHP  Sensation intact  DTRs symmetric  Coord intact    Assessment: L AMBER Aneurysm    Plan: Order diagnostic cerebral angiogram

## 2023-01-25 RX ORDER — SODIUM CHLORIDE 9 MG/ML
INJECTION, SOLUTION INTRAVENOUS
Status: CANCELLED | OUTPATIENT
Start: 2023-01-25

## 2023-01-26 ENCOUNTER — HOSPITAL ENCOUNTER (OUTPATIENT)
Dept: INTERVENTIONAL RADIOLOGY/VASCULAR | Facility: HOSPITAL | Age: 63
Discharge: HOME | End: 2023-01-26
Attending: PSYCHIATRY & NEUROLOGY
Payer: MEDICAID

## 2023-01-26 DIAGNOSIS — I67.1 CEREBRAL ANEURYSM, NONRUPTURED: ICD-10-CM

## 2023-01-26 DIAGNOSIS — I67.1 CEREBRAL ANEURYSM: ICD-10-CM

## 2023-02-13 ENCOUNTER — PATIENT MESSAGE (OUTPATIENT)
Dept: NEUROLOGY | Facility: CLINIC | Age: 63
End: 2023-02-13
Payer: MEDICAID

## 2023-02-14 ENCOUNTER — PATIENT MESSAGE (OUTPATIENT)
Dept: NEUROLOGY | Facility: CLINIC | Age: 63
End: 2023-02-14
Payer: MEDICAID

## 2023-03-07 ENCOUNTER — TELEPHONE (OUTPATIENT)
Dept: NEUROLOGY | Facility: CLINIC | Age: 63
End: 2023-03-07
Payer: MEDICAID

## 2023-03-07 DIAGNOSIS — Z01.818 PRE-OP TESTING: Primary | ICD-10-CM

## 2023-03-07 NOTE — TELEPHONE ENCOUNTER
Patient scheduled for cerebral angiogram 3/9/23. Patient' sister Tisha would like to be the   from [facility]. Her # [phone]

## 2023-03-08 ENCOUNTER — TELEPHONE (OUTPATIENT)
Dept: NEUROLOGY | Facility: CLINIC | Age: 63
End: 2023-03-08
Payer: MEDICAID

## 2023-03-08 ENCOUNTER — LAB VISIT (OUTPATIENT)
Dept: LAB | Facility: HOSPITAL | Age: 63
End: 2023-03-08
Attending: PSYCHIATRY & NEUROLOGY
Payer: MEDICAID

## 2023-03-08 DIAGNOSIS — Z01.818 PRE-OP TESTING: ICD-10-CM

## 2023-03-08 LAB
ANION GAP SERPL CALC-SCNC: 9 MEQ/L
BASOPHILS # BLD AUTO: 0.03 X10(3)/MCL (ref 0–0.2)
BASOPHILS NFR BLD AUTO: 0.3 %
BUN SERPL-MCNC: 10.1 MG/DL (ref 8.4–25.7)
CALCIUM SERPL-MCNC: 9.1 MG/DL (ref 8.8–10)
CHLORIDE SERPL-SCNC: 104 MMOL/L (ref 98–107)
CO2 SERPL-SCNC: 28 MMOL/L (ref 23–31)
CREAT SERPL-MCNC: 0.87 MG/DL (ref 0.73–1.18)
CREAT/UREA NIT SERPL: 12
EOSINOPHIL # BLD AUTO: 0.04 X10(3)/MCL (ref 0–0.9)
EOSINOPHIL NFR BLD AUTO: 0.4 %
ERYTHROCYTE [DISTWIDTH] IN BLOOD BY AUTOMATED COUNT: 15.2 % (ref 11.5–17)
GFR SERPLBLD CREATININE-BSD FMLA CKD-EPI: >60 MLS/MIN/1.73/M2
GLUCOSE SERPL-MCNC: 111 MG/DL (ref 82–115)
HCT VFR BLD AUTO: 50.5 % (ref 42–52)
HGB BLD-MCNC: 16.2 G/DL (ref 14–18)
IMM GRANULOCYTES # BLD AUTO: 0.03 X10(3)/MCL (ref 0–0.04)
IMM GRANULOCYTES NFR BLD AUTO: 0.3 %
INR BLD: 0.92 (ref 0–1.3)
LYMPHOCYTES # BLD AUTO: 2.4 X10(3)/MCL (ref 0.6–4.6)
LYMPHOCYTES NFR BLD AUTO: 26.8 %
MCH RBC QN AUTO: 28.2 PG
MCHC RBC AUTO-ENTMCNC: 32.1 G/DL (ref 33–36)
MCV RBC AUTO: 88 FL (ref 80–94)
MONOCYTES # BLD AUTO: 0.73 X10(3)/MCL (ref 0.1–1.3)
MONOCYTES NFR BLD AUTO: 8.1 %
NEUTROPHILS # BLD AUTO: 5.74 X10(3)/MCL (ref 2.1–9.2)
NEUTROPHILS NFR BLD AUTO: 64.1 %
NRBC BLD AUTO-RTO: 0 %
PLATELET # BLD AUTO: 211 X10(3)/MCL (ref 130–400)
PMV BLD AUTO: 10.9 FL (ref 7.4–10.4)
POTASSIUM SERPL-SCNC: 4.3 MMOL/L (ref 3.5–5.1)
PROTHROMBIN TIME: 12.3 SECONDS (ref 12.5–14.5)
RBC # BLD AUTO: 5.74 X10(6)/MCL (ref 4.7–6.1)
SODIUM SERPL-SCNC: 141 MMOL/L (ref 136–145)
WBC # SPEC AUTO: 9 X10(3)/MCL (ref 4.5–11.5)

## 2023-03-08 PROCEDURE — 36415 COLL VENOUS BLD VENIPUNCTURE: CPT

## 2023-03-08 PROCEDURE — 85025 COMPLETE CBC W/AUTO DIFF WBC: CPT

## 2023-03-08 PROCEDURE — 85610 PROTHROMBIN TIME: CPT

## 2023-03-08 PROCEDURE — 80048 BASIC METABOLIC PNL TOTAL CA: CPT

## 2023-03-09 ENCOUNTER — HOSPITAL ENCOUNTER (OUTPATIENT)
Dept: INTERVENTIONAL RADIOLOGY/VASCULAR | Facility: HOSPITAL | Age: 63
Discharge: HOME | End: 2023-03-09
Attending: PSYCHIATRY & NEUROLOGY
Payer: MEDICAID

## 2023-03-09 ENCOUNTER — HOSPITAL ENCOUNTER (OUTPATIENT)
Dept: RADIOLOGY | Facility: HOSPITAL | Age: 63
Discharge: HOME | End: 2023-03-09
Attending: PSYCHIATRY & NEUROLOGY
Payer: MEDICAID

## 2023-03-09 VITALS
DIASTOLIC BLOOD PRESSURE: 76 MMHG | TEMPERATURE: 99 F | WEIGHT: 169.75 LBS | BODY MASS INDEX: 25.73 KG/M2 | HEIGHT: 68 IN | HEART RATE: 85 BPM | OXYGEN SATURATION: 98 % | SYSTOLIC BLOOD PRESSURE: 138 MMHG

## 2023-03-09 DIAGNOSIS — I67.1 CEREBRAL ANEURYSM: ICD-10-CM

## 2023-03-09 DIAGNOSIS — I67.1 CEREBRAL ANEURYSM, NONRUPTURED: Primary | ICD-10-CM

## 2023-03-09 DIAGNOSIS — I63.9 CVA (CEREBRAL VASCULAR ACCIDENT): ICD-10-CM

## 2023-03-09 LAB
ALBUMIN SERPL-MCNC: 3.7 G/DL (ref 3.4–4.8)
ALBUMIN/GLOB SERPL: 1.1 RATIO (ref 1.1–2)
ALP SERPL-CCNC: 68 UNIT/L (ref 40–150)
ALT SERPL-CCNC: 34 UNIT/L (ref 0–55)
AST SERPL-CCNC: 30 UNIT/L (ref 5–34)
BASOPHILS # BLD AUTO: 0.05 X10(3)/MCL (ref 0–0.2)
BASOPHILS NFR BLD AUTO: 0.6 %
BILIRUBIN DIRECT+TOT PNL SERPL-MCNC: 0.6 MG/DL
BUN SERPL-MCNC: 19.3 MG/DL (ref 8.4–25.7)
CALCIUM SERPL-MCNC: 9.6 MG/DL (ref 8.8–10)
CHLORIDE SERPL-SCNC: 109 MMOL/L (ref 98–107)
CO2 SERPL-SCNC: 26 MMOL/L (ref 23–31)
CREAT SERPL-MCNC: 0.88 MG/DL (ref 0.73–1.18)
EOSINOPHIL # BLD AUTO: 0.04 X10(3)/MCL (ref 0–0.9)
EOSINOPHIL NFR BLD AUTO: 0.5 %
ERYTHROCYTE [DISTWIDTH] IN BLOOD BY AUTOMATED COUNT: 15.3 % (ref 11.5–17)
GFR SERPLBLD CREATININE-BSD FMLA CKD-EPI: >60 MLS/MIN/1.73/M2
GLOBULIN SER-MCNC: 3.4 GM/DL (ref 2.4–3.5)
GLUCOSE SERPL-MCNC: 105 MG/DL (ref 82–115)
HCT VFR BLD AUTO: 47.5 % (ref 42–52)
HGB BLD-MCNC: 15.7 G/DL (ref 14–18)
IMM GRANULOCYTES # BLD AUTO: 0.04 X10(3)/MCL (ref 0–0.04)
IMM GRANULOCYTES NFR BLD AUTO: 0.5 %
INR BLD: 0.92 (ref 0–1.3)
LYMPHOCYTES # BLD AUTO: 2.43 X10(3)/MCL (ref 0.6–4.6)
LYMPHOCYTES NFR BLD AUTO: 29.3 %
MCH RBC QN AUTO: 28.5 PG
MCHC RBC AUTO-ENTMCNC: 33.1 G/DL (ref 33–36)
MCV RBC AUTO: 86.2 FL (ref 80–94)
MONOCYTES # BLD AUTO: 0.77 X10(3)/MCL (ref 0.1–1.3)
MONOCYTES NFR BLD AUTO: 9.3 %
NEUTROPHILS # BLD AUTO: 4.96 X10(3)/MCL (ref 2.1–9.2)
NEUTROPHILS NFR BLD AUTO: 59.8 %
NRBC BLD AUTO-RTO: 0 %
PLATELET # BLD AUTO: 198 X10(3)/MCL (ref 130–400)
PMV BLD AUTO: 11.2 FL (ref 7.4–10.4)
POTASSIUM SERPL-SCNC: 4.3 MMOL/L (ref 3.5–5.1)
PROT SERPL-MCNC: 7.1 GM/DL (ref 5.8–7.6)
PROTHROMBIN TIME: 12.3 SECONDS (ref 12.5–14.5)
RBC # BLD AUTO: 5.51 X10(6)/MCL (ref 4.7–6.1)
SODIUM SERPL-SCNC: 143 MMOL/L (ref 136–145)
WBC # SPEC AUTO: 8.3 X10(3)/MCL (ref 4.5–11.5)

## 2023-03-09 PROCEDURE — C1760 CLOSURE DEV, VASC: HCPCS | Performed by: PSYCHIATRY & NEUROLOGY

## 2023-03-09 PROCEDURE — 63600175 PHARM REV CODE 636 W HCPCS: Performed by: PSYCHIATRY & NEUROLOGY

## 2023-03-09 PROCEDURE — 80053 COMPREHEN METABOLIC PANEL: CPT

## 2023-03-09 PROCEDURE — C1769 GUIDE WIRE: HCPCS | Performed by: PSYCHIATRY & NEUROLOGY

## 2023-03-09 PROCEDURE — 76377 PR  3D RENDERING W/ IMAGE POSTPROCESS: ICD-10-PCS | Mod: 26,,, | Performed by: PSYCHIATRY & NEUROLOGY

## 2023-03-09 PROCEDURE — 85025 COMPLETE CBC W/AUTO DIFF WBC: CPT

## 2023-03-09 PROCEDURE — 70450 CT HEAD/BRAIN W/O DYE: CPT | Mod: TC

## 2023-03-09 PROCEDURE — 25000003 PHARM REV CODE 250: Performed by: PSYCHIATRY & NEUROLOGY

## 2023-03-09 PROCEDURE — 36224 PR ANGIO INTRCRNL ART +/- CERVIOCEREBRAL ARCH, INTRNL CAROTID ART, SELECTV CATH ,S&I: ICD-10-PCS | Mod: 50,,, | Performed by: PSYCHIATRY & NEUROLOGY

## 2023-03-09 PROCEDURE — 76377 3D RENDER W/INTRP POSTPROCES: CPT | Mod: 26,,, | Performed by: PSYCHIATRY & NEUROLOGY

## 2023-03-09 PROCEDURE — 36224 PLACE CATH CAROTD ART: CPT | Mod: 50,,, | Performed by: PSYCHIATRY & NEUROLOGY

## 2023-03-09 PROCEDURE — 36226 PLACE CATH VERTEBRAL ART: CPT | Mod: 51,50,, | Performed by: PSYCHIATRY & NEUROLOGY

## 2023-03-09 PROCEDURE — 76377 3D RENDER W/INTRP POSTPROCES: CPT | Mod: TC | Performed by: PSYCHIATRY & NEUROLOGY

## 2023-03-09 PROCEDURE — 99152 MOD SED SAME PHYS/QHP 5/>YRS: CPT | Performed by: PSYCHIATRY & NEUROLOGY

## 2023-03-09 PROCEDURE — 85610 PROTHROMBIN TIME: CPT

## 2023-03-09 PROCEDURE — 36226 PR ANGIO VERTEBRAL ARTERY +/- CERVIOCEREBRAL ARCH, VERTEBRAL ART, SELECTV CATH,S&I: ICD-10-PCS | Mod: 51,50,, | Performed by: PSYCHIATRY & NEUROLOGY

## 2023-03-09 PROCEDURE — C1894 INTRO/SHEATH, NON-LASER: HCPCS | Performed by: PSYCHIATRY & NEUROLOGY

## 2023-03-09 PROCEDURE — C1887 CATHETER, GUIDING: HCPCS | Performed by: PSYCHIATRY & NEUROLOGY

## 2023-03-09 PROCEDURE — 36226 PLACE CATH VERTEBRAL ART: CPT | Mod: 50 | Performed by: PSYCHIATRY & NEUROLOGY

## 2023-03-09 PROCEDURE — 36224 PLACE CATH CAROTD ART: CPT | Mod: 50

## 2023-03-09 PROCEDURE — 25500020 PHARM REV CODE 255: Performed by: PSYCHIATRY & NEUROLOGY

## 2023-03-09 RX ORDER — SODIUM CHLORIDE 9 MG/ML
0-999 INJECTION, SOLUTION INTRAVENOUS
Status: DISCONTINUED | OUTPATIENT
Start: 2023-03-09 | End: 2023-03-10 | Stop reason: HOSPADM

## 2023-03-09 RX ORDER — FENTANYL CITRATE 50 UG/ML
INJECTION, SOLUTION INTRAMUSCULAR; INTRAVENOUS
Status: COMPLETED | OUTPATIENT
Start: 2023-03-09 | End: 2023-03-09

## 2023-03-09 RX ORDER — HEPARIN SOD,PORCINE/0.9 % NACL 1000/500ML
INTRAVENOUS SOLUTION INTRAVENOUS
Status: COMPLETED | OUTPATIENT
Start: 2023-03-09 | End: 2023-03-09

## 2023-03-09 RX ORDER — SODIUM CHLORIDE 9 MG/ML
INJECTION, SOLUTION INTRAVENOUS
Status: DISCONTINUED | OUTPATIENT
Start: 2023-03-09 | End: 2023-03-10 | Stop reason: HOSPADM

## 2023-03-09 RX ORDER — MIDAZOLAM HYDROCHLORIDE 1 MG/ML
INJECTION INTRAMUSCULAR; INTRAVENOUS
Status: COMPLETED | OUTPATIENT
Start: 2023-03-09 | End: 2023-03-09

## 2023-03-09 RX ORDER — HYDRALAZINE HYDROCHLORIDE 20 MG/ML
INJECTION INTRAMUSCULAR; INTRAVENOUS
Status: COMPLETED | OUTPATIENT
Start: 2023-03-09 | End: 2023-03-09

## 2023-03-09 RX ORDER — LIDOCAINE HYDROCHLORIDE 20 MG/ML
INJECTION, SOLUTION INFILTRATION; PERINEURAL
Status: COMPLETED | OUTPATIENT
Start: 2023-03-09 | End: 2023-03-09

## 2023-03-09 RX ADMIN — FENTANYL CITRATE 25 MCG: 50 INJECTION, SOLUTION INTRAMUSCULAR; INTRAVENOUS at 09:03

## 2023-03-09 RX ADMIN — HYDRALAZINE HYDROCHLORIDE 20 MG: 20 INJECTION INTRAMUSCULAR; INTRAVENOUS at 09:03

## 2023-03-09 RX ADMIN — Medication 2000 ML: at 09:03

## 2023-03-09 RX ADMIN — MIDAZOLAM 0.5 MG: 1 INJECTION INTRAMUSCULAR; INTRAVENOUS at 09:03

## 2023-03-09 RX ADMIN — IOPAMIDOL 100 ML: 755 INJECTION, SOLUTION INTRAVENOUS at 10:03

## 2023-03-09 RX ADMIN — LIDOCAINE HYDROCHLORIDE 5 ML: 20 INJECTION, SOLUTION INFILTRATION; PERINEURAL at 09:03

## 2023-03-09 NOTE — DISCHARGE SUMMARY
[facility] - Interventional Radiology  Discharge Note  Short Stay    IR Angiogram Carotid Cerebral Bilateral      OUTCOME: Patient tolerated treatment/procedure well without complication and is now ready for discharge.    DISPOSITION: Home or Self Care    FINAL DIAGNOSIS:  <principal problem not specified>    FOLLOWUP: In clinic    DISCHARGE INSTRUCTIONS:  No discharge procedures on file.     TIME SPENT ON DISCHARGE: 31 minutes

## 2023-03-09 NOTE — H&P
[facility]                       [address] [place] [address]    PATIENT NAME:       VLADISLAV BROWN  YOB: 1960  CSN:                [id number]   MRN:                [id number]  ADMIT DATE:         03/09/2023 06:52:08  PHYSICIAN:          Shahzad King MD                        HISTORY AND PHYSICAL      PREOPERATIVE DIAGNOSIS:  Left anterior cerebral artery aneurysm.    POSTOPERATIVE DIAGNOSIS:  Left anterior cerebral artery wide-necked aneurysm,   approximately 7 x 7 mm.    PROCEDURES PERFORMED:    1. Cerebral angiogram with catheter insertion in the following arteries:  Right   common carotid, right internal carotid, right subclavian, right vertebral, left   common carotid, left internal carotid, left subclavian, left vertebral.  2. Three-dimensional rotational angiogram with post-processing on a separate   station (x2).    LEVEL OF SEDATION:  Conscious sedation.  Sedation administered by independent   trained observer under attending supervision with continuous monitoring of the   patient's level of consciousness and physiologic status.  Total intraservice   sedation time 60 minutes.    COMPLICATIONS:  None.    DETAILED DESCRIPTION:  Following informed consent, the patient was prepped and   draped in the usual sterile fashion.  I infiltrated the right groin with local   anesthetic and punctured the right femoral artery, placing a 5-Italian sheath   without incident.  I introduced my catheter and wire and advanced into the   aortic arch.  I selected the right common carotid artery.  Angiographic images   demonstrated a normal bifurcation.  I selected the right internal carotid   artery.  Cerebral AP and lateral views demonstrated the previously discovered   left anterior cerebral artery aneurysm.  I performed a three-dimensional   rotational angiogram with post-processing on a separate station.   Rotational   angiogram again demonstrated the aneurysm.  Due to flow characteristics, it was   not optimally visualized via the right internal carotid artery injection.  I   then selected the right subclavian artery.  Angiographic images demonstrated no   stenosis at the origin of the right vertebral artery.  I selected the right   vertebral artery.  Cerebral AP and lateral views demonstrated no evidence of   significant stenosis, mass effect, vascular malformation, or early venous   drainage.  I then selected the left common carotid artery.  Angiographic images   demonstrated mild stenosis of the carotid bifurcation.  I selected the left   internal carotid artery.  Cerebral AP and lateral views demonstrated the left   anterior cerebral artery aneurysm.  I performed a three-dimensional rotational   angiogram with post-processing on a separate station.  Rotational angiogram   demonstrated a wide-necked, approximately 7 x 7 mm left anterior cerebral artery   aneurysm with evidence of instability.  I then selected the left subclavian   artery.  Angiographic images demonstrated no stenosis at the origin of the left   vertebral artery.  I selected the left vertebral artery.  Cerebral AP and   lateral views demonstrated no evidence of significant stenosis, mass effect,   vascular malformation, or early venous drainage.  I removed the catheter.    Hemostasis was achieved using a Mynx closure device.      The patient tolerated the procedure well without apparent complication.        ______________________________  Shahzad King MD    DEP/AQS  DD:  03/09/2023  Time:  10:08AM  DT:  03/09/2023  Time:  10:24AM  Job #:  [id number]      HISTORY AND PHYSICAL

## 2023-03-09 NOTE — H&P
Pre-Operative History and Physical   Interventional Neurology    Marino Mccrary is a 63 y.o. male here for DSA.    ROS:  Review of Systems   All other systems reviewed and are negative.     Past Medical History:   Diagnosis Date    Acid reflux     Cerebral aneurysm     Heart disease     Hypertension     Mixed hyperlipidemia      Past Surgical History:   Procedure Laterality Date    BACK SURGERY      CARDIAC SURGERY      KNEE SURGERY       Family History   Family history unknown: Yes     Review of patient's allergies indicates:  No Known Allergies    Current Outpatient Medications:     aspirin (ECOTRIN) 81 MG EC tablet, Take 81 mg by mouth once daily., Disp: , Rfl:     clopidogreL (PLAVIX) 75 mg tablet, Take 75 mg by mouth once daily., Disp: , Rfl:     metoprolol succinate (TOPROL-XL) 50 MG 24 hr tablet, Take 50 mg by mouth., Disp: , Rfl:     sacubitriL-valsartan (ENTRESTO) 24-26 mg per tablet, Take 1 tablet by mouth 2 (two) times daily., Disp: , Rfl:     amitriptyline (ELAVIL) 50 MG tablet, Take 25 mg by mouth every evening., Disp: , Rfl:     atorvastatin (LIPITOR) 10 MG tablet, Take 10 mg by mouth once daily., Disp: , Rfl:     clindamycin (CLEOCIN) 150 MG capsule, SMARTSIG:3 Capsule(s) By Mouth Every 8 Hours, Disp: , Rfl:     ergocalciferol (ERGOCALCIFEROL) 50,000 unit Cap, Take 1 capsule (50,000 Units total) by mouth every 7 days. for 12 doses, Disp: 12 capsule, Rfl: 0    fluticasone propionate (FLONASE) 50 mcg/actuation nasal spray, 1 spray by Each Nostril route., Disp: , Rfl:     pantoprazole (PROTONIX) 40 MG tablet, Take 1 tablet (40 mg total) by mouth once daily., Disp: 60 tablet, Rfl: 0    scopolamine (TRANSDERM-SCOP) 1.3-1.5 mg (1 mg over 3 days), Place 1 patch onto the skin every 72 hours., Disp: , Rfl:     Current Facility-Administered Medications:     0.9%  NaCl infusion, 0-999 mL/hr, Intravenous, On Call Procedure, Paper Order    0.9%  NaCl infusion, , Intravenous, On Call Procedure, Shahzad CASTORENA  MD Christine  Outpatient Medications Marked as Taking for the 3/9/23 encounter (Hospital Encounter) with [facility] IR1   Medication Sig Dispense Refill    aspirin (ECOTRIN) 81 MG EC tablet Take 81 mg by mouth once daily.      clopidogreL (PLAVIX) 75 mg tablet Take 75 mg by mouth once daily.      metoprolol succinate (TOPROL-XL) 50 MG 24 hr tablet Take 50 mg by mouth.      sacubitriL-valsartan (ENTRESTO) 24-26 mg per tablet Take 1 tablet by mouth 2 (two) times daily.       Social History     Tobacco Use    Smoking status: Every Day     Packs/day: 1.00     Types: Cigarettes    Smokeless tobacco: Never   Substance Use Topics    Alcohol use: Yes     Comment: 1/2 pint of whiskey every month    Drug use: Never         Vitals:    03/09/23 0723   BP: (!) 162/84   Pulse: 79   Temp: 98.7 °F (37.1 °C)     Gen NAD  HEENT NC/AT  CV RRR, no carotid bruits  Resp clear  GI soft  Ext no c/c/e  Neuro  AAOx4  Speech fluent, appropriate  EOMI, PERRLA, VFF  LUMN facial droop  Tongue and palate midline  Motor LHP  Sensation intact  DTRs symmetric  Coord intact        Assessment: Cerebral Aneurysm     Plan: DSA    I have discussed the risks, benefits, indications, and alternatives of the procedure in detail.  The patient verbalizes her understanding.  All questions answered.  Consents signed.  The patient agrees to proceed to proceed as planned.

## 2023-03-09 NOTE — DISCHARGE INSTRUCTIONS
You may remove your dressing tomorrow (3/10/2023) @ 1:00 pm. Site may stay open to air afterwards.   No showering until dressing is removed tomorrow. No baths or soaking site in water (tubs, hot tub, or pool) for 5 days.   No bending over, strenuous activity, or lifting over 5 lbs for five days.  If signs of infection develop (fever, chills, purulent drainage, etc.) develop, please call your doctor.  If bleeding occurs, lay down and apply direct and firm pressure to the site for 10 minutes. Call 911!  Please keep your follow up appointments and resume taking all your medications as ordered.

## 2023-03-21 DIAGNOSIS — I67.1 CEREBRAL ANEURYSM, NONRUPTURED: Primary | ICD-10-CM

## 2023-04-26 ENCOUNTER — ANESTHESIA (OUTPATIENT)
Dept: INTERVENTIONAL RADIOLOGY/VASCULAR | Facility: HOSPITAL | Age: 63
DRG: 026 | End: 2023-04-26
Payer: MEDICAID

## 2023-04-26 ENCOUNTER — HOSPITAL ENCOUNTER (OUTPATIENT)
Dept: RADIOLOGY | Facility: HOSPITAL | Age: 63
Discharge: HOME | DRG: 026 | End: 2023-04-26
Attending: PSYCHIATRY & NEUROLOGY
Payer: MEDICAID

## 2023-04-26 ENCOUNTER — HOSPITAL ENCOUNTER (INPATIENT)
Dept: INTERVENTIONAL RADIOLOGY/VASCULAR | Facility: HOSPITAL | Age: 63
LOS: 19 days | Discharge: NURSING, REHAB OR LONG-TERM CARE | DRG: 026 | End: 2023-05-15
Attending: PSYCHIATRY & NEUROLOGY | Admitting: INTERNAL MEDICINE
Payer: MEDICAID

## 2023-04-26 DIAGNOSIS — I49.3 PVC'S (PREMATURE VENTRICULAR CONTRACTIONS): ICD-10-CM

## 2023-04-26 DIAGNOSIS — R13.10 DYSPHAGIA, UNSPECIFIED TYPE: ICD-10-CM

## 2023-04-26 DIAGNOSIS — I67.1 CEREBRAL ANEURYSM, NONRUPTURED: ICD-10-CM

## 2023-04-26 DIAGNOSIS — I67.1 UNRUPTURED CEREBRAL ANEURYSM: Primary | ICD-10-CM

## 2023-04-26 DIAGNOSIS — I63.511 ACUTE ISCHEMIC RIGHT MCA STROKE: ICD-10-CM

## 2023-04-26 LAB
ANION GAP SERPL CALC-SCNC: 10 MEQ/L
BASOPHILS # BLD AUTO: 0.03 X10(3)/MCL (ref 0–0.2)
BASOPHILS NFR BLD AUTO: 0.4 %
BUN SERPL-MCNC: 18.7 MG/DL (ref 8.4–25.7)
CALCIUM SERPL-MCNC: 9 MG/DL (ref 8.8–10)
CHLORIDE SERPL-SCNC: 106 MMOL/L (ref 98–107)
CO2 SERPL-SCNC: 25 MMOL/L (ref 23–31)
CREAT SERPL-MCNC: 0.84 MG/DL (ref 0.73–1.18)
CREAT/UREA NIT SERPL: 22
EOSINOPHIL # BLD AUTO: 0.04 X10(3)/MCL (ref 0–0.9)
EOSINOPHIL NFR BLD AUTO: 0.5 %
ERYTHROCYTE [DISTWIDTH] IN BLOOD BY AUTOMATED COUNT: 14.9 % (ref 11.5–17)
GFR SERPLBLD CREATININE-BSD FMLA CKD-EPI: >60 MLS/MIN/1.73/M2
GLUCOSE SERPL-MCNC: 97 MG/DL (ref 82–115)
HCT VFR BLD AUTO: 49.4 % (ref 42–52)
HGB BLD-MCNC: 15.9 G/DL (ref 14–18)
IMM GRANULOCYTES # BLD AUTO: 0.02 X10(3)/MCL (ref 0–0.04)
IMM GRANULOCYTES NFR BLD AUTO: 0.3 %
INR BLD: 1 (ref 0–1.3)
LYMPHOCYTES # BLD AUTO: 2.47 X10(3)/MCL (ref 0.6–4.6)
LYMPHOCYTES NFR BLD AUTO: 32.5 %
MCH RBC QN AUTO: 28.8 PG (ref 27–31)
MCHC RBC AUTO-ENTMCNC: 32.2 G/DL (ref 33–36)
MCV RBC AUTO: 89.3 FL (ref 80–94)
MONOCYTES # BLD AUTO: 0.59 X10(3)/MCL (ref 0.1–1.3)
MONOCYTES NFR BLD AUTO: 7.8 %
NEUTROPHILS # BLD AUTO: 4.46 X10(3)/MCL (ref 2.1–9.2)
NEUTROPHILS NFR BLD AUTO: 58.5 %
NRBC BLD AUTO-RTO: 0 %
PLATELET # BLD AUTO: 189 X10(3)/MCL (ref 130–400)
PMV BLD AUTO: 10.7 FL (ref 7.4–10.4)
POCT GLUCOSE: 89 MG/DL (ref 70–110)
POTASSIUM SERPL-SCNC: 4.1 MMOL/L (ref 3.5–5.1)
PROTHROMBIN TIME: 13.1 SECONDS (ref 12.5–14.5)
RBC # BLD AUTO: 5.53 X10(6)/MCL (ref 4.7–6.1)
SODIUM SERPL-SCNC: 141 MMOL/L (ref 136–145)
WBC # SPEC AUTO: 7.6 X10(3)/MCL (ref 4.5–11.5)

## 2023-04-26 PROCEDURE — 25000003 PHARM REV CODE 250

## 2023-04-26 PROCEDURE — D9220A PRA ANESTHESIA: Mod: ,,,

## 2023-04-26 PROCEDURE — 61624 TCAT PERM OCCLS/EMBOLJ CNS: CPT | Mod: ,,, | Performed by: PSYCHIATRY & NEUROLOGY

## 2023-04-26 PROCEDURE — 36224 PR ANGIO INTRCRNL ART +/- CERVIOCEREBRAL ARCH, INTRNL CAROTID ART, SELECTV CATH ,S&I: ICD-10-PCS | Mod: 51,LT,, | Performed by: PSYCHIATRY & NEUROLOGY

## 2023-04-26 PROCEDURE — 76377 3D RENDER W/INTRP POSTPROCES: CPT | Mod: 26,,, | Performed by: PSYCHIATRY & NEUROLOGY

## 2023-04-26 PROCEDURE — 36228 PR ANGIO INTRL CAROT/VERT, INTRCRNL BRNCH, SELECTV CATH,S&I: ICD-10-PCS | Mod: LT,,, | Performed by: PSYCHIATRY & NEUROLOGY

## 2023-04-26 PROCEDURE — 99223 PR INITIAL HOSPITAL CARE,LEVL III: ICD-10-PCS | Mod: 25,,, | Performed by: PSYCHIATRY & NEUROLOGY

## 2023-04-26 PROCEDURE — 76377 3D RENDER W/INTRP POSTPROCES: CPT | Mod: TC | Performed by: PSYCHIATRY & NEUROLOGY

## 2023-04-26 PROCEDURE — 37000008 HC ANESTHESIA 1ST 15 MINUTES: Performed by: PSYCHIATRY & NEUROLOGY

## 2023-04-26 PROCEDURE — 25000003 PHARM REV CODE 250: Performed by: ANESTHESIOLOGY

## 2023-04-26 PROCEDURE — 61624 PR PERM OCCLUSION/EMBOLIZATION,PERCUT,CNS: ICD-10-PCS | Mod: ,,, | Performed by: PSYCHIATRY & NEUROLOGY

## 2023-04-26 PROCEDURE — 75898 FOLLOW-UP ANGIOGRAPHY: CPT | Mod: 26,,, | Performed by: PSYCHIATRY & NEUROLOGY

## 2023-04-26 PROCEDURE — D9220A PRA ANESTHESIA: ICD-10-PCS | Mod: ,,,

## 2023-04-26 PROCEDURE — 36224 PLACE CATH CAROTD ART: CPT | Mod: 51,LT,, | Performed by: PSYCHIATRY & NEUROLOGY

## 2023-04-26 PROCEDURE — 75894 X-RAYS TRANSCATH THERAPY: CPT | Mod: 26,,, | Performed by: PSYCHIATRY & NEUROLOGY

## 2023-04-26 PROCEDURE — 85610 PROTHROMBIN TIME: CPT | Performed by: PSYCHIATRY & NEUROLOGY

## 2023-04-26 PROCEDURE — 75898 PR  ANGIOGRAM,F/U STUDY,CATH THER/EMBOL/INF: ICD-10-PCS | Mod: 26,,, | Performed by: PSYCHIATRY & NEUROLOGY

## 2023-04-26 PROCEDURE — 36224 PLACE CATH CAROTD ART: CPT | Mod: LT | Performed by: PSYCHIATRY & NEUROLOGY

## 2023-04-26 PROCEDURE — C1769 GUIDE WIRE: HCPCS | Performed by: PSYCHIATRY & NEUROLOGY

## 2023-04-26 PROCEDURE — 75894 X-RAYS TRANSCATH THERAPY: CPT | Mod: TC

## 2023-04-26 PROCEDURE — 85025 COMPLETE CBC W/AUTO DIFF WBC: CPT | Performed by: PSYCHIATRY & NEUROLOGY

## 2023-04-26 PROCEDURE — C1887 CATHETER, GUIDING: HCPCS | Performed by: PSYCHIATRY & NEUROLOGY

## 2023-04-26 PROCEDURE — 36228 PLACE CATH INTRACRANIAL ART: CPT | Mod: LT | Performed by: PSYCHIATRY & NEUROLOGY

## 2023-04-26 PROCEDURE — 75894 PR  TRANSCATHETER RX EMBOLIZATN: ICD-10-PCS | Mod: 26,,, | Performed by: PSYCHIATRY & NEUROLOGY

## 2023-04-26 PROCEDURE — 80048 BASIC METABOLIC PNL TOTAL CA: CPT | Performed by: PSYCHIATRY & NEUROLOGY

## 2023-04-26 PROCEDURE — 63600175 PHARM REV CODE 636 W HCPCS: Performed by: FAMILY MEDICINE

## 2023-04-26 PROCEDURE — 63600175 PHARM REV CODE 636 W HCPCS

## 2023-04-26 PROCEDURE — 25500020 PHARM REV CODE 255: Performed by: PSYCHIATRY & NEUROLOGY

## 2023-04-26 PROCEDURE — 37000009 HC ANESTHESIA EA ADD 15 MINS: Performed by: PSYCHIATRY & NEUROLOGY

## 2023-04-26 PROCEDURE — 36228 PLACE CATH INTRACRANIAL ART: CPT | Mod: LT,,, | Performed by: PSYCHIATRY & NEUROLOGY

## 2023-04-26 PROCEDURE — 27201423 OPTIME MED/SURG SUP & DEVICES STERILE SUPPLY: Performed by: PSYCHIATRY & NEUROLOGY

## 2023-04-26 PROCEDURE — 27800903 OPTIME MED/SURG SUP & DEVICES OTHER IMPLANTS: Performed by: PSYCHIATRY & NEUROLOGY

## 2023-04-26 PROCEDURE — 94761 N-INVAS EAR/PLS OXIMETRY MLT: CPT

## 2023-04-26 PROCEDURE — 20000000 HC ICU ROOM

## 2023-04-26 PROCEDURE — 99223 1ST HOSP IP/OBS HIGH 75: CPT | Mod: 25,,, | Performed by: PSYCHIATRY & NEUROLOGY

## 2023-04-26 PROCEDURE — 76377 PR  3D RENDERING W/ IMAGE POSTPROCESS: ICD-10-PCS | Mod: 26,,, | Performed by: PSYCHIATRY & NEUROLOGY

## 2023-04-26 PROCEDURE — 25000003 PHARM REV CODE 250: Performed by: NURSE PRACTITIONER

## 2023-04-26 PROCEDURE — 75898 FOLLOW-UP ANGIOGRAPHY: CPT | Mod: TC | Performed by: PSYCHIATRY & NEUROLOGY

## 2023-04-26 PROCEDURE — 70450 CT HEAD/BRAIN W/O DYE: CPT | Mod: TC

## 2023-04-26 PROCEDURE — C1894 INTRO/SHEATH, NON-LASER: HCPCS | Performed by: PSYCHIATRY & NEUROLOGY

## 2023-04-26 PROCEDURE — 27000221 HC OXYGEN, UP TO 24 HOURS

## 2023-04-26 RX ORDER — ATROPINE SULFATE 0.1 MG/ML
INJECTION INTRAVENOUS
Status: DISCONTINUED
Start: 2023-04-26 | End: 2023-04-26 | Stop reason: WASHOUT

## 2023-04-26 RX ORDER — GLYCOPYRROLATE 0.2 MG/ML
INJECTION INTRAMUSCULAR; INTRAVENOUS
Status: DISCONTINUED | OUTPATIENT
Start: 2023-04-26 | End: 2023-04-26

## 2023-04-26 RX ORDER — PHENYLEPHRINE HCL IN 0.9% NACL 1 MG/10 ML
SYRINGE (ML) INTRAVENOUS
Status: DISCONTINUED | OUTPATIENT
Start: 2023-04-26 | End: 2023-04-26

## 2023-04-26 RX ORDER — CEFAZOLIN SODIUM 1 G/3ML
INJECTION, POWDER, FOR SOLUTION INTRAMUSCULAR; INTRAVENOUS
Status: DISCONTINUED | OUTPATIENT
Start: 2023-04-26 | End: 2023-04-26

## 2023-04-26 RX ORDER — LIDOCAINE HYDROCHLORIDE 20 MG/ML
INJECTION, SOLUTION EPIDURAL; INFILTRATION; INTRACAUDAL; PERINEURAL
Status: DISCONTINUED | OUTPATIENT
Start: 2023-04-26 | End: 2023-04-26

## 2023-04-26 RX ORDER — ESMOLOL HYDROCHLORIDE 10 MG/ML
INJECTION INTRAVENOUS
Status: DISCONTINUED | OUTPATIENT
Start: 2023-04-26 | End: 2023-04-26

## 2023-04-26 RX ORDER — QUETIAPINE FUMARATE 100 MG/1
100 TABLET, FILM COATED ORAL NIGHTLY
Status: ON HOLD | COMMUNITY
Start: 2023-04-14 | End: 2023-05-15 | Stop reason: HOSPADM

## 2023-04-26 RX ORDER — ROCURONIUM BROMIDE 10 MG/ML
INJECTION, SOLUTION INTRAVENOUS
Status: DISCONTINUED | OUTPATIENT
Start: 2023-04-26 | End: 2023-04-26

## 2023-04-26 RX ORDER — MUPIROCIN 20 MG/G
OINTMENT TOPICAL 2 TIMES DAILY
Status: DISPENSED | OUTPATIENT
Start: 2023-04-26 | End: 2023-05-01

## 2023-04-26 RX ORDER — CLOPIDOGREL BISULFATE 75 MG/1
75 TABLET ORAL DAILY
Status: DISCONTINUED | OUTPATIENT
Start: 2023-04-27 | End: 2023-05-03 | Stop reason: SDUPTHER

## 2023-04-26 RX ORDER — LIDOCAINE HYDROCHLORIDE 10 MG/ML
1 INJECTION, SOLUTION EPIDURAL; INFILTRATION; INTRACAUDAL; PERINEURAL ONCE
Status: DISCONTINUED | OUTPATIENT
Start: 2023-04-26 | End: 2023-05-04

## 2023-04-26 RX ORDER — ASPIRIN 81 MG/1
81 TABLET ORAL DAILY
Status: DISCONTINUED | OUTPATIENT
Start: 2023-04-27 | End: 2023-05-03

## 2023-04-26 RX ORDER — ONDANSETRON HYDROCHLORIDE 2 MG/ML
INJECTION, SOLUTION INTRAMUSCULAR; INTRAVENOUS
Status: DISCONTINUED | OUTPATIENT
Start: 2023-04-26 | End: 2023-04-26

## 2023-04-26 RX ORDER — ONDANSETRON 2 MG/ML
4 INJECTION INTRAMUSCULAR; INTRAVENOUS EVERY 6 HOURS PRN
Status: DISCONTINUED | OUTPATIENT
Start: 2023-04-26 | End: 2023-05-15 | Stop reason: HOSPADM

## 2023-04-26 RX ORDER — ZOLPIDEM TARTRATE 5 MG/1
5 TABLET ORAL NIGHTLY PRN
Status: ON HOLD | COMMUNITY
Start: 2023-04-18 | End: 2023-05-15 | Stop reason: HOSPADM

## 2023-04-26 RX ORDER — HEPARIN SODIUM 1000 [USP'U]/ML
INJECTION, SOLUTION INTRAVENOUS; SUBCUTANEOUS
Status: DISCONTINUED | OUTPATIENT
Start: 2023-04-26 | End: 2023-04-26

## 2023-04-26 RX ORDER — ACETAMINOPHEN 325 MG/1
650 TABLET ORAL EVERY 6 HOURS PRN
Status: DISCONTINUED | OUTPATIENT
Start: 2023-04-26 | End: 2023-05-15 | Stop reason: HOSPADM

## 2023-04-26 RX ORDER — HEPARIN SOD,PORCINE/0.9 % NACL 1000/500ML
INTRAVENOUS SOLUTION INTRAVENOUS
Status: COMPLETED | OUTPATIENT
Start: 2023-04-26 | End: 2023-04-26

## 2023-04-26 RX ORDER — LABETALOL HYDROCHLORIDE 5 MG/ML
10 INJECTION, SOLUTION INTRAVENOUS
Status: DISCONTINUED | OUTPATIENT
Start: 2023-04-26 | End: 2023-05-15 | Stop reason: HOSPADM

## 2023-04-26 RX ORDER — EPHEDRINE SULFATE 50 MG/ML
INJECTION, SOLUTION INTRAVENOUS
Status: DISCONTINUED | OUTPATIENT
Start: 2023-04-26 | End: 2023-04-26

## 2023-04-26 RX ORDER — SPIRONOLACTONE 25 MG/1
25 TABLET ORAL DAILY
Status: ON HOLD | COMMUNITY
Start: 2023-04-14 | End: 2023-05-15 | Stop reason: HOSPADM

## 2023-04-26 RX ORDER — SODIUM CHLORIDE 9 MG/ML
INJECTION, SOLUTION INTRAVENOUS CONTINUOUS
Status: DISCONTINUED | OUTPATIENT
Start: 2023-04-26 | End: 2023-04-30

## 2023-04-26 RX ORDER — FAMOTIDINE 10 MG/ML
20 INJECTION INTRAVENOUS ONCE
Status: COMPLETED | OUTPATIENT
Start: 2023-04-26 | End: 2023-04-26

## 2023-04-26 RX ORDER — PROPOFOL 10 MG/ML
VIAL (ML) INTRAVENOUS
Status: DISCONTINUED | OUTPATIENT
Start: 2023-04-26 | End: 2023-04-26

## 2023-04-26 RX ORDER — DEXAMETHASONE SODIUM PHOSPHATE 4 MG/ML
INJECTION, SOLUTION INTRA-ARTICULAR; INTRALESIONAL; INTRAMUSCULAR; INTRAVENOUS; SOFT TISSUE
Status: DISCONTINUED | OUTPATIENT
Start: 2023-04-26 | End: 2023-04-26

## 2023-04-26 RX ADMIN — CEFAZOLIN 2 G: 1 INJECTION, POWDER, FOR SOLUTION INTRAMUSCULAR; INTRAVENOUS at 01:04

## 2023-04-26 RX ADMIN — PROPOFOL 200 MG: 10 INJECTION, EMULSION INTRAVENOUS at 01:04

## 2023-04-26 RX ADMIN — HEPARIN SODIUM 7000 UNITS: 1000 INJECTION, SOLUTION INTRAVENOUS; SUBCUTANEOUS at 02:04

## 2023-04-26 RX ADMIN — Medication 100 MCG: at 01:04

## 2023-04-26 RX ADMIN — Medication 200 MCG: at 01:04

## 2023-04-26 RX ADMIN — Medication 50 MCG: at 02:04

## 2023-04-26 RX ADMIN — Medication 100 MCG: at 02:04

## 2023-04-26 RX ADMIN — IOPAMIDOL 120 ML: 755 INJECTION, SOLUTION INTRAVENOUS at 03:04

## 2023-04-26 RX ADMIN — PROPOFOL 100 MG: 10 INJECTION, EMULSION INTRAVENOUS at 01:04

## 2023-04-26 RX ADMIN — MUPIROCIN: 20 OINTMENT TOPICAL at 08:04

## 2023-04-26 RX ADMIN — DEXAMETHASONE SODIUM PHOSPHATE 4 MG: 4 INJECTION, SOLUTION INTRA-ARTICULAR; INTRALESIONAL; INTRAMUSCULAR; INTRAVENOUS; SOFT TISSUE at 01:04

## 2023-04-26 RX ADMIN — LIDOCAINE HYDROCHLORIDE 80 MG: 20 INJECTION, SOLUTION INTRAVENOUS at 01:04

## 2023-04-26 RX ADMIN — SUGAMMADEX 100 MG: 100 INJECTION, SOLUTION INTRAVENOUS at 02:04

## 2023-04-26 RX ADMIN — FAMOTIDINE 20 MG: 10 INJECTION, SOLUTION INTRAVENOUS at 11:04

## 2023-04-26 RX ADMIN — SUGAMMADEX 100 MG: 100 INJECTION, SOLUTION INTRAVENOUS at 03:04

## 2023-04-26 RX ADMIN — Medication 1000 ML: at 02:04

## 2023-04-26 RX ADMIN — GLYCOPYRROLATE 0.2 MG: 0.2 INJECTION INTRAMUSCULAR; INTRAVENOUS at 01:04

## 2023-04-26 RX ADMIN — ROCURONIUM BROMIDE 100 MG: 10 INJECTION, SOLUTION INTRAVENOUS at 01:04

## 2023-04-26 RX ADMIN — ESMOLOL HYDROCHLORIDE 30 MG: 100 INJECTION, SOLUTION INTRAVENOUS at 01:04

## 2023-04-26 RX ADMIN — SODIUM CHLORIDE: 9 INJECTION, SOLUTION INTRAVENOUS at 04:04

## 2023-04-26 RX ADMIN — EPHEDRINE SULFATE 10 MG: 50 INJECTION, SOLUTION INTRAMUSCULAR; INTRAVENOUS; SUBCUTANEOUS at 02:04

## 2023-04-26 RX ADMIN — SODIUM CHLORIDE, SODIUM GLUCONATE, SODIUM ACETATE, POTASSIUM CHLORIDE AND MAGNESIUM CHLORIDE: 526; 502; 368; 37; 30 INJECTION, SOLUTION INTRAVENOUS at 01:04

## 2023-04-26 RX ADMIN — LABETALOL HYDROCHLORIDE 10 MG: 5 INJECTION, SOLUTION INTRAVENOUS at 11:04

## 2023-04-26 RX ADMIN — PROPOFOL 50 MG: 10 INJECTION, EMULSION INTRAVENOUS at 02:04

## 2023-04-26 RX ADMIN — LORAZEPAM 1 MG: 2 INJECTION INTRAMUSCULAR; INTRAVENOUS at 10:04

## 2023-04-26 RX ADMIN — Medication 5000 ML: at 01:04

## 2023-04-26 RX ADMIN — LABETALOL HYDROCHLORIDE 10 MG: 5 INJECTION, SOLUTION INTRAVENOUS at 10:04

## 2023-04-26 RX ADMIN — ONDANSETRON HYDROCHLORIDE 4 MG: 2 INJECTION, SOLUTION INTRAMUSCULAR; INTRAVENOUS at 01:04

## 2023-04-26 NOTE — H&P
Pre-Operative History and Physical   Interventional Neurology    Marino Mccrary is a 63 y.o. male here for embolization    ROS:  Review of Systems   All other systems reviewed and are negative.     Past Medical History:   Diagnosis Date    Acid reflux     Cerebral aneurysm     Heart disease     Hypertension     Mixed hyperlipidemia      Past Surgical History:   Procedure Laterality Date    BACK SURGERY      CARDIAC SURGERY      INSERT / REPLACE / REMOVE PACEMAKER Left     KNEE SURGERY       Family History   Family history unknown: Yes     Review of patient's allergies indicates:  No Known Allergies    Current Outpatient Medications:     aspirin (ECOTRIN) 81 MG EC tablet, Take 81 mg by mouth once daily., Disp: , Rfl:     atorvastatin (LIPITOR) 10 MG tablet, Take 10 mg by mouth once daily., Disp: , Rfl:     clopidogreL (PLAVIX) 75 mg tablet, Take 75 mg by mouth once daily., Disp: , Rfl:     metoprolol succinate (TOPROL-XL) 100 MG 24 hr tablet, Take 100 mg by mouth., Disp: , Rfl:     QUEtiapine (SEROQUEL) 100 MG Tab, Take 100 mg by mouth nightly., Disp: , Rfl:     sacubitriL-valsartan (ENTRESTO) 24-26 mg per tablet, Take 1 tablet by mouth 2 (two) times daily., Disp: , Rfl:     spironolactone (ALDACTONE) 25 MG tablet, Take 25 mg by mouth once daily., Disp: , Rfl:     zolpidem (AMBIEN) 5 MG Tab, Take 5 mg by mouth nightly as needed., Disp: , Rfl:     amitriptyline (ELAVIL) 50 MG tablet, Take 25 mg by mouth every evening., Disp: , Rfl:     clindamycin (CLEOCIN) 150 MG capsule, SMARTSIG:3 Capsule(s) By Mouth Every 8 Hours, Disp: , Rfl:     fluticasone propionate (FLONASE) 50 mcg/actuation nasal spray, 1 spray by Each Nostril route., Disp: , Rfl:     pantoprazole (PROTONIX) 40 MG tablet, Take 1 tablet (40 mg total) by mouth once daily., Disp: 60 tablet, Rfl: 0    scopolamine (TRANSDERM-SCOP) 1.3-1.5 mg (1 mg over 3 days), Place 1 patch onto the skin every 72 hours., Disp: , Rfl:     Current Facility-Administered  Medications:     LIDOcaine (PF) 10 mg/ml (1%) injection 10 mg, 1 mL, Intradermal, Once, Edgar Ferguson, DO  Outpatient Medications Marked as Taking for the 4/26/23 encounter (Hospital Encounter) with [facility] IR1   Medication Sig Dispense Refill    aspirin (ECOTRIN) 81 MG EC tablet Take 81 mg by mouth once daily.      atorvastatin (LIPITOR) 10 MG tablet Take 10 mg by mouth once daily.      clopidogreL (PLAVIX) 75 mg tablet Take 75 mg by mouth once daily.      metoprolol succinate (TOPROL-XL) 100 MG 24 hr tablet Take 100 mg by mouth.      QUEtiapine (SEROQUEL) 100 MG Tab Take 100 mg by mouth nightly.      sacubitriL-valsartan (ENTRESTO) 24-26 mg per tablet Take 1 tablet by mouth 2 (two) times daily.      spironolactone (ALDACTONE) 25 MG tablet Take 25 mg by mouth once daily.      zolpidem (AMBIEN) 5 MG Tab Take 5 mg by mouth nightly as needed.       Social History     Tobacco Use    Smoking status: Every Day     Packs/day: 1.00     Types: Cigarettes    Smokeless tobacco: Never   Substance Use Topics    Alcohol use: Yes     Comment: 1/2 pint of whiskey every month    Drug use: Never         Vitals:    04/26/23 0927   BP: 126/82   Pulse: 67   Temp: 97.9 °F (36.6 °C)     Gen NAD  HEENT NC/AT  CV RRR, no carotid bruits  Resp clear  GI soft  Ext edema and erythema left hand (currently on clinda for this)  Neuro  AAOx4  Speech fluent, appropriate  EOMI, PERRLA, VFF  LUMN facial droop  Tongue and palate midline  Motor LHP  Sensation intact  DTRs symmetric  Coord intact        Assessment: LACA Aneurysm    Plan: Embolization    I have discussed the risks, benefits, indications, and alternatives of the procedure in detail.  The patient verbalizes her understanding.  All questions answered.  Consents signed.  The patient agrees to proceed to proceed as planned.

## 2023-04-26 NOTE — OP NOTE
[facility]                       [address] [place] [address]    PATIENT NAME:      VLADISLAV BROWN  YOB: 1960  CSN:               [id number]  MRN:               [id number]  ADMIT DATE:        04/26/2023 09:17:54  PHYSICIAN:         Shahzad King MD                          OPERATIVE REPORT      DATE OF SURGERY:    04/26/2023 00:00:00    SURGEON:  Shahzad King MD    PREOPERATIVE DIAGNOSIS:  Left anterior cerebral artery aneurysm.    POSTOPERATIVE DIAGNOSIS:  Left anterior cerebral artery aneurysm.    PROCEDURES PERFORMED:    1. Transcatheter permanent occlusion, percutaneous, central nervous system   (intracranial).  2. Transcatheter therapy, embolization, radiological supervision and   interpretation.  3. Angiography through existing catheter for followup study for transcatheter   therapy, embolization.  4. Three-dimensional rotational angiogram with post processing on a separate   station.  5. Cerebral angiogram with catheter insertion in the following arteries:  Left   common carotid, left internal carotid, left anterior cerebral.    SEDATION:  General endotracheal anesthesia.    COMPLICATIONS:  None.    DETAILED DESCRIPTION:  Following informed consent, the patient was prepped and   draped in usual sterile fashion.  I punctured the right femoral artery placing a   5-Telugu sheath and then upsizing to an 8-Telugu sheath without incident.  I   introduced a BMX 90 cm sheath advancing it over a [employer] Select catheter and   Cook Glidewire, selecting the left common carotid artery.  Angiographic images   demonstrated a normal bifurcation.  I selected the left internal carotid artery   with the system.  Angiographic images demonstrated the previously discovered   left anterior cerebral artery aneurysm.  I then performed a three-dimensional   rotational angiogram with post processing on a separate  station.  Rotational   angiogram again demonstrated the aneurysm.  It measured approximately 6.5 x 6.5   x 7 mm.  I then introduced a catalyst 5 intermediate catheter advancing it over   an SL10 microcatheter and Synchro microwire distally in the left internal   carotid artery.  I then selected the left anterior cerebral artery with a   microcatheter and microwire, advancing them distal to the aneurysm.  I then   deployed a [employer] [place] 3 x 24 mm stent across the aneurysm.  I then   repositioned the microcatheter into the aneurysm dome and performed coil   embolization of the aneurysm using a series of Penumbra detachable coils.    Followup angiography through existing catheter demonstrated successful   embolization.  I carefully removed the microcatheter from the aneurysm.  Final   cerebral AP, lateral, and oblique views demonstrated successful embolization   with preserved patency of the stent.  All native vessels remained patent.  The   patient tolerated the procedure well without apparent complication.        ______________________________  Shahzad King MD    DEP/AQS  DD:  04/26/2023  Time:  03:08PM  DT:  04/26/2023  Time:  03:24PM  Job #:  [id number]      OPERATIVE REPORT

## 2023-04-26 NOTE — TRANSFER OF CARE
"Anesthesia Transfer of Care Note    Patient: Marino Mccrary    Procedure(s) Performed: * No procedures listed *    Patient location: ICU    Anesthesia Type: general    Transport from OR: Transported from OR on room air with adequate spontaneous ventilation. Continuous ECG monitoring in transport. Continuous SpO2 monitoring in transport. Continuos invasive BP monitoring in transport    Post pain: adequate analgesia    Post assessment: no apparent anesthetic complications    Post vital signs: stable    Level of consciousness: responds to stimulation and sedated    Nausea/Vomiting: no nausea/vomiting    Complications: none    Transfer of care protocol was followed      Last vitals:   Visit Vitals  /82   Pulse 67   Temp 36.6 °C (97.9 °F) (Oral)   Ht 5' 8" (1.727 m)   Wt 74.8 kg (164 lb 14.5 oz)   SpO2 97%   BMI 25.07 kg/m²     "

## 2023-04-26 NOTE — ANESTHESIA PREPROCEDURE EVALUATION
04/26/2023  Marino Mccrary is a 63 y.o., male.      Pre-op Assessment    I have reviewed the Patient Summary Reports.     I have reviewed the Nursing Notes. I have reviewed the NPO Status.   I have reviewed the Medications.     Review of Systems  Anesthesia Hx:  No problems with previous Anesthesia    Social:  Smoker    Cardiovascular:   Hypertension Past MI (MIx2, last one approx 2003) CABG/stent (CABG 2003)   Denies Angina.  Denies CHF. hyperlipidemia    Pulmonary:   Denies COPD.  Denies Asthma.    Renal/:   Denies Chronic Renal Disease. no renal calculi     Hepatic/GI:   GERD Denies Liver Disease. Denies Hepatitis.    Neurological:   CVA Denies Seizures. Rt MCA ischemic stroke, with Left arm weakness, some left leg weakness, speech deficit   Endocrine:   Denies Diabetes. Denies Hypothyroidism. Denies Hyperthyroidism.  Denies Obesity / BMI > 30      Physical Exam  General: Well nourished, Cooperative, Alert and Oriented    Airway:  Mallampati: I   Mouth Opening: Normal  TM Distance: Normal  Tongue: Normal  Neck ROM: Normal ROM    Dental:  Edentulous, Dentures        Anesthesia Plan  Type of Anesthesia, risks & benefits discussed:    Anesthesia Type: Gen ETT  Intra-op Monitoring Plan: Standard ASA Monitors and Art Line  Induction:  IV  Airway Plan: Video  Informed Consent: Informed consent signed with the Patient and all parties understand the risks and agree with anesthesia plan.  All questions answered.   ASA Score: 4  Day of Surgery Review of History & Physical: H&P Update referred to the surgeon/provider.    Ready For Surgery From Anesthesia Perspective.     .

## 2023-04-26 NOTE — PROGRESS NOTES
[facility] - Interventional Radiology  Neurology  Consult Note        Marino Mccrary is a 63 y.o. male who presented to [facility] on 4/26/2023 for schedule coil embolization of Left AMBER aneurysm.  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      Past Medical History:   Diagnosis Date    Acid reflux     Cerebral aneurysm     Heart disease     Hypertension     Mixed hyperlipidemia      Past Surgical History:   Procedure Laterality Date    BACK SURGERY      CARDIAC SURGERY      INSERT / REPLACE / REMOVE PACEMAKER Left     KNEE SURGERY       Family History   Family history unknown: Yes     Social History     Tobacco Use    Smoking status: Every Day     Packs/day: 1.00     Types: Cigarettes    Smokeless tobacco: Never   Substance Use Topics    Alcohol use: Yes     Comment: 1/2 pint of whiskey every month    Drug use: Never      Review of patient's allergies indicates:  No Known Allergies      Neurological Exam:   LOC: follows requests and drowsy  Speech: Dysarthria  EOM (CN III, IV, VI): Full/intact  Pupils (CN III, IV, VI): PERRL  Facial Movement (CN VII): lower weakness left lower  Motor*: Arm Left:  Paretic:  4/5, Leg Left:   Paretic:  4/5, Arm Right:   Normal (5/5), Leg Right:   Normal (5/5)  Right groin sheath in place with transparent dressing.      PLAN:  s/p cerebral angiogram and embolization of Left AMBER aneurysm    -ok to resume home diet  -ASA and Plavix tomorrow, received dose this AM  -continue all home meds  -q1hr neuro checks  -monitor for HA or visual changes, STAT CTh if symptoms persist  -d/c sheath when ACT less than 180  -neuro vascular checks while sheath is in place  -vital signs q1hr  -allow BP to normalize  -Labetalol IV for BP greater than 140/90  -NS @ 75mL/hr x 1 bag then saline lock if tolerating PO intake      -Patient needs to ambulate 2 times tomorrow before discharge  -Plan to discharge after 24  hours  -Follow up appointment with Dr King in 4-6 weeks       Thank you for your consult.   Further recommendations to follow by MD.    Snow Tsang, JOVANNIP  Neurology  [facility] - Interventional Radiology

## 2023-04-26 NOTE — ANESTHESIA PROCEDURE NOTES
Intubation    Date/Time: 4/26/2023 1:21 PM  Performed by: Marcos Gastelum CRNA  Authorized by: Edgar Ferguson DO     Intubation:     Induction:  Intravenous    Intubated:  Postinduction    Mask Ventilation:  Easy mask    Attempts:  1    Attempted By:  CRNA    Method of Intubation:  Direct    Blade:  [place] 2    Laryngeal View Grade: Grade I - full view of cords      Difficult Airway Encountered?: No      Complications:  None    Airway Device:  Oral endotracheal tube    Airway Device Size:  7.5    Style/Cuff Inflation:  Cuffed (inflated to minimal occlusive pressure)    Tube secured:  22    Secured at:  The lips    Placement Verified By:  Capnometry    Complicating Factors:  None    Findings Post-Intubation:  BS equal bilateral and atraumatic/condition of teeth unchanged

## 2023-04-26 NOTE — H&P
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  When asked, patient is able to move his right upper extremity and right lower extremity with full range of motion.  Patient is not able to fully extend the left upper extremity and has trouble lifting the arm above his had.  The patient has some trouble with lifting the right lower extremity.  History is otherwise limited secondary to incomprehensible speech likely due to anesthetics, however, can not rule out brain pathology.    Hospital Course/Significant events:  04/26/2023:  Coil embolization of left AMBER aneurysm and admission to the ICU    24 Hour Interval History:      Past Medical History:   Diagnosis Date    Acid reflux     Cerebral aneurysm     Heart disease     Hypertension     Mixed hyperlipidemia        Past Surgical History:   Procedure Laterality Date    BACK SURGERY      CARDIAC SURGERY      INSERT / REPLACE / REMOVE PACEMAKER Left     KNEE SURGERY         Social History     Socioeconomic History    Marital status:    Tobacco Use    Smoking status: Every Day     Packs/day: 1.00     Types: Cigarettes    Smokeless tobacco: Never   Substance and Sexual Activity    Alcohol use: Yes     Comment: 1/2 pint of whiskey every month    Drug use: Never           Current Outpatient Medications   Medication Instructions    amitriptyline (ELAVIL) 25 mg, Oral, Nightly    aspirin (ECOTRIN) 81 mg,  Oral, Daily    atorvastatin (LIPITOR) 10 mg, Oral, Daily    clindamycin (CLEOCIN) 150 MG capsule SMARTSIG:3 Capsule(s) By Mouth Every 8 Hours    clopidogreL (PLAVIX) 75 mg, Oral, Daily    fluticasone propionate (FLONASE) 50 mcg/actuation nasal spray 1 spray, Each Nostril    metoprolol succinate (TOPROL-XL) 100 mg, Oral    pantoprazole (PROTONIX) 40 mg, Oral, Daily    QUEtiapine (SEROQUEL) 100 mg, Oral, Nightly    sacubitriL-valsartan (ENTRESTO) 24-26 mg per tablet 1 tablet, Oral, 2 times daily    scopolamine (TRANSDERM-SCOP) 1.3-1.5 mg (1 mg over 3 days) 1 patch, Transdermal, Every 72 hours    spironolactone (ALDACTONE) 25 mg, Oral, Daily    zolpidem (AMBIEN) 5 mg, Oral, Nightly PRN       Current Inpatient Medications   [START ON 4/27/2023] aspirin  81 mg Oral Daily    [START ON 4/27/2023] clopidogreL  75 mg Oral Daily    LIDOcaine (PF) 10 mg/ml (1%)  1 mL Intradermal Once       Current Intravenous Infusions        Review of Systems   Reason unable to perform ROS: Incomprehensible speech.        Objective:       Intake/Output Summary (Last 24 hours) at 4/26/2023 1607  Last data filed at 4/26/2023 1507  Gross per 24 hour   Intake 1200.17 ml   Output --   Net 1200.17 ml         Vital Signs (Most Recent):  Temp: 97.9 °F (36.6 °C) (04/26/23 0927)  Pulse: 67 (04/26/23 0927)  BP: 126/82 (04/26/23 0927)  SpO2: 97 % (04/26/23 0927)  Body mass index is 25.07 kg/m².  Weight: 74.8 kg (164 lb 14.5 oz) Vital Signs (24h Range):  Temp:  [97.9 °F (36.6 °C)] 97.9 °F (36.6 °C)  Pulse:  [67] 67  SpO2:  [97 %] 97 %  BP: (126)/(82) 126/82     Physical Exam  Constitutional:       General: He is not in acute distress.     Appearance: He is not ill-appearing, toxic-appearing or diaphoretic.   HENT:      Head: Normocephalic and atraumatic.      Right Ear: Tympanic membrane normal.      Left Ear: Tympanic membrane normal.      Nose: Nose normal.      Mouth/Throat:      Mouth: Mucous membranes are moist.      Pharynx: Oropharynx is clear. No  oropharyngeal exudate or posterior oropharyngeal erythema.   Eyes:      Extraocular Movements: Extraocular movements intact.      Conjunctiva/sclera: Conjunctivae normal.      Pupils: Pupils are equal, round, and reactive to light.   Cardiovascular:      Rate and Rhythm: Normal rate and regular rhythm.      Pulses: Normal pulses.      Heart sounds: Normal heart sounds. No murmur heard.    No friction rub. No gallop.   Pulmonary:      Effort: Pulmonary effort is normal. No respiratory distress.      Breath sounds: Normal breath sounds. No stridor. No wheezing, rhonchi or rales.   Chest:      Chest wall: No tenderness.   Abdominal:      General: Bowel sounds are normal. There is no distension.      Palpations: Abdomen is soft. There is no mass.      Tenderness: There is no abdominal tenderness. There is no guarding or rebound.      Hernia: No hernia is present.   Musculoskeletal:         General: No swelling or tenderness. Normal range of motion.      Comments: Right brace to the right lower extremity with a catheter was placed to the right groin   Skin:     General: Skin is warm.      Capillary Refill: Capillary refill takes less than 2 seconds.      Coloration: Skin is not jaundiced.      Findings: No erythema.   Neurological:      Comments: Gag reflex intact.  Corneal reflex intact  Pupillary reflex intact  Ocular motor reflex intact  Patient is coughing without issue.  Full range of motion to the right upper and lower extremities.   Range of motion to the left upper extremity is limited and patient is not fully able to extend.  Strength is 3/5 to the left upper extremity  4/5 strength to the left lower extremity  Unable to completely assess mental status as the patient's speech is incomprehensible           Lines/Drains/Airways       Drain  Duration                  Sheath 04/26/23 1354 Right anterior;proximal <1 day         Urethral Catheter 04/26/23 1325 Silicone 16 Fr. <1 day              Peripheral Intravenous  Line  Duration                  Peripheral IV - Single Lumen 04/26/23 1015 20 G Left Antecubital <1 day                    Significant Labs:    Lab Results   Component Value Date    WBC 7.6 04/26/2023    HGB 15.9 04/26/2023    HCT 49.4 04/26/2023    MCV 89.3 04/26/2023     04/26/2023         BMP  Lab Results   Component Value Date     04/26/2023    K 4.1 04/26/2023    CHLORIDE 106 04/26/2023    CO2 25 04/26/2023    BUN 18.7 04/26/2023    CREATININE 0.84 04/26/2023    CALCIUM 9.0 04/26/2023    AGAP 10.0 04/26/2023       ABG  No results for input(s): PH, PO2, PCO2, HCO3, BE in the last 168 hours.    Mechanical Ventilation Support:       Significant Imaging:          Assessment/Plan:     Assessment  Status post coil embolization of an aneurysm to the left AMBER  History of CVA to the right MCA with residual left-sided upper and lower extremity weakness and left-sided facial droop  Hypertension      Plan  Care and recommendations per Neurology, appreciate recs  SBP below 130  Neurochecks Q 1  Continuous groin site assessment right-sided with removal of sheath  Currently unable to assess speech as it is incomprehensible, however, speech has improved after 3rd assessment and may likely be due to anesthesia and intubation  -will reassess speech and mental status hourly for improvement  Continue aspirin and clopidogrel per Neurology  Multimodal pain management  Diet NPO  Zofran as needed for nausea/vomiting  Acetaminophen as needed for pain  Close monitoring and evaluation in the ICU    DVT Prophylaxis:  SCD  GI Prophylaxis:  None     32 minutes of critical care was time spent personally by me on the following activities: development of treatment plan with patient or surrogate and bedside caregivers, discussions with consultants, evaluation of patient's response to treatment, examination of patient, ordering and performing treatments and interventions, ordering and review of laboratory studies, ordering and  review of radiographic studies, pulse oximetry, re-evaluation of patient's condition.  This critical care time did not overlap with that of any other provider or involve time for any procedures.     Emmett Smith MD  Pulmonary Critical Care Medicine  [facility] - [facility]

## 2023-04-27 PROBLEM — I67.1 UNRUPTURED CEREBRAL ANEURYSM: Status: ACTIVE | Noted: 2023-04-27

## 2023-04-27 LAB
ALBUMIN SERPL-MCNC: 3.5 G/DL (ref 3.4–4.8)
ALBUMIN/GLOB SERPL: 1.3 RATIO (ref 1.1–2)
ALP SERPL-CCNC: 58 UNIT/L (ref 40–150)
ALT SERPL-CCNC: 24 UNIT/L (ref 0–55)
ANION GAP SERPL CALC-SCNC: 10 MEQ/L
AST SERPL-CCNC: 24 UNIT/L (ref 5–34)
BASOPHILS # BLD AUTO: 0.02 X10(3)/MCL (ref 0–0.2)
BASOPHILS NFR BLD AUTO: 0.2 %
BILIRUBIN DIRECT+TOT PNL SERPL-MCNC: 0.9 MG/DL
BUN SERPL-MCNC: 12.6 MG/DL (ref 8.4–25.7)
BUN SERPL-MCNC: 13.2 MG/DL (ref 8.4–25.7)
CALCIUM SERPL-MCNC: 7.9 MG/DL (ref 8.8–10)
CALCIUM SERPL-MCNC: 8.3 MG/DL (ref 8.8–10)
CHLORIDE SERPL-SCNC: 106 MMOL/L (ref 98–107)
CHLORIDE SERPL-SCNC: 107 MMOL/L (ref 98–107)
CO2 SERPL-SCNC: 20 MMOL/L (ref 23–31)
CO2 SERPL-SCNC: 23 MMOL/L (ref 23–31)
CREAT SERPL-MCNC: 0.68 MG/DL (ref 0.73–1.18)
CREAT SERPL-MCNC: 0.69 MG/DL (ref 0.73–1.18)
CREAT/UREA NIT SERPL: 19
EOSINOPHIL # BLD AUTO: 0 X10(3)/MCL (ref 0–0.9)
EOSINOPHIL NFR BLD AUTO: 0 %
ERYTHROCYTE [DISTWIDTH] IN BLOOD BY AUTOMATED COUNT: 14.9 % (ref 11.5–17)
GFR SERPLBLD CREATININE-BSD FMLA CKD-EPI: >60 MLS/MIN/1.73/M2
GFR SERPLBLD CREATININE-BSD FMLA CKD-EPI: >60 MLS/MIN/1.73/M2
GLOBULIN SER-MCNC: 2.7 GM/DL (ref 2.4–3.5)
GLUCOSE SERPL-MCNC: 108 MG/DL (ref 82–115)
GLUCOSE SERPL-MCNC: 108 MG/DL (ref 82–115)
HCT VFR BLD AUTO: 41.4 % (ref 42–52)
HGB BLD-MCNC: 13.7 G/DL (ref 14–18)
IMM GRANULOCYTES # BLD AUTO: 0.04 X10(3)/MCL (ref 0–0.04)
IMM GRANULOCYTES NFR BLD AUTO: 0.3 %
LYMPHOCYTES # BLD AUTO: 1.49 X10(3)/MCL (ref 0.6–4.6)
LYMPHOCYTES NFR BLD AUTO: 12.9 %
MAGNESIUM SERPL-MCNC: 1.6 MG/DL (ref 1.6–2.6)
MCH RBC QN AUTO: 28.8 PG (ref 27–31)
MCHC RBC AUTO-ENTMCNC: 33.1 G/DL (ref 33–36)
MCV RBC AUTO: 87.2 FL (ref 80–94)
MONOCYTES # BLD AUTO: 0.76 X10(3)/MCL (ref 0.1–1.3)
MONOCYTES NFR BLD AUTO: 6.6 %
NEUTROPHILS # BLD AUTO: 9.21 X10(3)/MCL (ref 2.1–9.2)
NEUTROPHILS NFR BLD AUTO: 80 %
NRBC BLD AUTO-RTO: 0 %
PHOSPHATE SERPL-MCNC: 1.9 MG/DL (ref 2.3–4.7)
PLATELET # BLD AUTO: 192 X10(3)/MCL (ref 130–400)
PMV BLD AUTO: 11 FL (ref 7.4–10.4)
POCT GLUCOSE: 109 MG/DL (ref 70–110)
POTASSIUM SERPL-SCNC: 3.6 MMOL/L (ref 3.5–5.1)
POTASSIUM SERPL-SCNC: 3.8 MMOL/L (ref 3.5–5.1)
PROT SERPL-MCNC: 6.2 GM/DL (ref 5.8–7.6)
RBC # BLD AUTO: 4.75 X10(6)/MCL (ref 4.7–6.1)
SODIUM SERPL-SCNC: 136 MMOL/L (ref 136–145)
SODIUM SERPL-SCNC: 137 MMOL/L (ref 136–145)
WBC # SPEC AUTO: 11.5 X10(3)/MCL (ref 4.5–11.5)

## 2023-04-27 PROCEDURE — 20000000 HC ICU ROOM

## 2023-04-27 PROCEDURE — 63600175 PHARM REV CODE 636 W HCPCS

## 2023-04-27 PROCEDURE — 25000003 PHARM REV CODE 250: Performed by: ANESTHESIOLOGY

## 2023-04-27 PROCEDURE — 84100 ASSAY OF PHOSPHORUS: CPT

## 2023-04-27 PROCEDURE — 25000003 PHARM REV CODE 250: Performed by: NURSE PRACTITIONER

## 2023-04-27 PROCEDURE — 83735 ASSAY OF MAGNESIUM: CPT

## 2023-04-27 PROCEDURE — 93010 ELECTROCARDIOGRAM REPORT: CPT | Mod: ,,, | Performed by: STUDENT IN AN ORGANIZED HEALTH CARE EDUCATION/TRAINING PROGRAM

## 2023-04-27 PROCEDURE — 93005 ELECTROCARDIOGRAM TRACING: CPT

## 2023-04-27 PROCEDURE — 25000003 PHARM REV CODE 250: Performed by: FAMILY MEDICINE

## 2023-04-27 PROCEDURE — 63600175 PHARM REV CODE 636 W HCPCS: Performed by: FAMILY MEDICINE

## 2023-04-27 PROCEDURE — 80053 COMPREHEN METABOLIC PANEL: CPT | Performed by: INTERNAL MEDICINE

## 2023-04-27 PROCEDURE — 99233 SBSQ HOSP IP/OBS HIGH 50: CPT | Mod: ,,, | Performed by: PSYCHIATRY & NEUROLOGY

## 2023-04-27 PROCEDURE — 25000003 PHARM REV CODE 250

## 2023-04-27 PROCEDURE — 99233 PR SUBSEQUENT HOSPITAL CARE,LEVL III: ICD-10-PCS | Mod: ,,, | Performed by: PSYCHIATRY & NEUROLOGY

## 2023-04-27 PROCEDURE — 93010 EKG 12-LEAD: ICD-10-PCS | Mod: ,,, | Performed by: STUDENT IN AN ORGANIZED HEALTH CARE EDUCATION/TRAINING PROGRAM

## 2023-04-27 PROCEDURE — 85025 COMPLETE CBC W/AUTO DIFF WBC: CPT | Performed by: INTERNAL MEDICINE

## 2023-04-27 PROCEDURE — 63600175 PHARM REV CODE 636 W HCPCS: Performed by: PSYCHIATRY & NEUROLOGY

## 2023-04-27 RX ORDER — MAGNESIUM SULFATE HEPTAHYDRATE 40 MG/ML
4 INJECTION, SOLUTION INTRAVENOUS ONCE
Status: COMPLETED | OUTPATIENT
Start: 2023-04-27 | End: 2023-04-27

## 2023-04-27 RX ORDER — HYDRALAZINE HYDROCHLORIDE 20 MG/ML
10 INJECTION INTRAMUSCULAR; INTRAVENOUS EVERY 4 HOURS PRN
Status: DISCONTINUED | OUTPATIENT
Start: 2023-04-27 | End: 2023-05-15 | Stop reason: HOSPADM

## 2023-04-27 RX ORDER — ENALAPRILAT 1.25 MG/ML
1.25 INJECTION INTRAVENOUS EVERY 6 HOURS PRN
Status: DISCONTINUED | OUTPATIENT
Start: 2023-04-27 | End: 2023-05-15 | Stop reason: HOSPADM

## 2023-04-27 RX ADMIN — POTASSIUM PHOSPHATE, MONOBASIC AND POTASSIUM PHOSPHATE, DIBASIC 30 MMOL: 224; 236 INJECTION, SOLUTION, CONCENTRATE INTRAVENOUS at 04:04

## 2023-04-27 RX ADMIN — HYDRALAZINE HYDROCHLORIDE 10 MG: 20 INJECTION INTRAMUSCULAR; INTRAVENOUS at 12:04

## 2023-04-27 RX ADMIN — MUPIROCIN: 20 OINTMENT TOPICAL at 12:04

## 2023-04-27 RX ADMIN — ASPIRIN 81 MG: 81 TABLET, COATED ORAL at 12:04

## 2023-04-27 RX ADMIN — LORAZEPAM 1 MG: 2 INJECTION INTRAMUSCULAR; INTRAVENOUS at 12:04

## 2023-04-27 RX ADMIN — SODIUM CHLORIDE: 9 INJECTION, SOLUTION INTRAVENOUS at 04:04

## 2023-04-27 RX ADMIN — ENALAPRILAT 1.25 MG: 2.5 INJECTION INTRAVENOUS at 12:04

## 2023-04-27 RX ADMIN — MAGNESIUM SULFATE HEPTAHYDRATE 4 G: 40 INJECTION, SOLUTION INTRAVENOUS at 04:04

## 2023-04-27 RX ADMIN — LABETALOL HYDROCHLORIDE 10 MG: 5 INJECTION, SOLUTION INTRAVENOUS at 03:04

## 2023-04-27 RX ADMIN — ENALAPRILAT 1.25 MG: 2.5 INJECTION INTRAVENOUS at 02:04

## 2023-04-27 RX ADMIN — CLOPIDOGREL BISULFATE 75 MG: 75 TABLET ORAL at 12:04

## 2023-04-27 NOTE — PROGRESS NOTES
[facility]  Neurology  Progress Note    Patient Name: Marino Mccrary  MRN: [id number]  Admission Date: 4/26/2023  Hospital Length of Stay: 1 days  Code Status: Prior   Attending Provider: Franki Shea MD  Primary Care Physician: TERESSA Andrews MD   Principal Problem:<principal problem not specified>    HPI:   Marino Mccrary is a 63 y.o. male who presented to [facility] on 4/26/2023 for schedule coil embolization of Left AMBER aneurysm.  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  He was admitted to ICU for post procedure monitoring.    PMHx:  right-sided MCA stroke with residual left-sided weakness and facial droop, CAD, HTN, HLD       Overview/Hospital Course:  No notes on file        Subjective:     Interval History: Nurse reports increased confusion overnight.  STAT CTh showed no acute intracranial abnormality.  He was given Ativan 1mg during the night.    This morning, he is sleepy.  He will open his eyes and look toward verbal stimuli, but quickly falls back to sleep.  He does not answer any questions or follow commands.  Moves all extremities spontaneously.      Current Facility-Administered Medications   Medication Dose Route Frequency Provider Last Rate Last Admin    0.9%  NaCl infusion   Intravenous Continuous TELMA Bangura 75 mL/hr at 04/27/23 0453 New Bag at 04/27/23 0453    acetaminophen tablet 650 mg  650 mg Oral Q6H PRN TELMA Bangura        aspirin EC tablet 81 mg  81 mg Oral Daily TELMA Bangura   81 mg at 04/27/23 1210    clopidogreL tablet 75 mg  75 mg Oral Daily JOVANNI BanguraP   75 mg at 04/27/23 1210    enalaprilat injection 1.25 mg  1.25 mg Intravenous Q6H PRN Gregorio Mera MD   1.25 mg at 04/27/23 0052    hydrALAZINE injection 10 mg  10 mg Intravenous Q4H PRN Gregorio Mera MD   10 mg at 04/27/23 0018    labetaloL injection 10 mg  10 mg Intravenous PRN Emmett  MD Sarah   10 mg at 04/26/23 2336    LIDOcaine (PF) 10 mg/ml (1%) injection 10 mg  1 mL Intradermal Once Edgar Ferguson DO        mupirocin 2 % ointment   Nasal BID Edgar Ferguson DO   Given at 04/27/23 1210    ondansetron injection 4 mg  4 mg Intravenous Q6H PRN TELMA Bangura           Review of Systems    Objective:     Vital Signs (Most Recent):  Temp: 98.3 °F (36.8 °C) (04/27/23 1200)  Pulse: 82 (04/27/23 1200)  Resp: (!) 21 (04/27/23 1200)  BP: (!) 111/58 (04/27/23 1200)  SpO2: 95 % (04/27/23 1145)   Vital Signs (24h Range):  Temp:  [98.3 °F (36.8 °C)-98.6 °F (37 °C)] 98.3 °F (36.8 °C)  Pulse:  [] 82  Resp:  [11-26] 21  SpO2:  [68 %-100 %] 95 %  BP: (103-167)/() 111/58  Arterial Line BP: (-)/(-50-78) 150/57     Weight: 74.8 kg (164 lb 14.5 oz)  Body mass index is 25.07 kg/m².    Physical Exam  Constitutional:       General: He is not in acute distress.     Comments: Sleepy ... will open eyes and look toward verbal stimuli ... but very quick to go back to sleep   Eyes:      Extraocular Movements: Extraocular movements intact.      Pupils: Pupils are equal, round, and reactive to light.   Cardiovascular:      Rate and Rhythm: Normal rate and regular rhythm.   Pulmonary:      Effort: Pulmonary effort is normal.   Skin:     General: Skin is warm and dry.      Comments: Right groin puncture site with gauze/transparent dressing intact ... soft, flat, no hematoma   Neurological:      Comments: Nonverbal  Does not follow commands  Moves all extremities spontaneously         Significant Labs: BMP:   Recent Labs   Lab 04/26/23  1036 04/27/23  0748    137   K 4.1 3.8   CO2 25 23   BUN 18.7 12.6   CREATININE 0.84 0.69*   CALCIUM 9.0 8.3*     CBC:   Recent Labs   Lab 04/26/23  1036 04/27/23  0748   WBC 7.6 11.5   HGB 15.9 13.7*   HCT 49.4 41.4*    192       Significant Imaging: I have reviewed all pertinent imaging results/findings within the past 24 hours.      Assessment and Plan:      Unruptured cerebral aneurysm  s/p cerebral angiogram and embolization of Left AMBER aneurysm    STAT CTh   Will need to place NGT to ensure patient receives ASA and Plavix    Keep scheduled appointment with Dr King on May 17 at 11am.    The above findings, diagnostics, and treatment plan were discussed with Dr. King, who is in agreement with the plan of care.         VTE Risk Mitigation (From admission, onward)      None            1135:  Nurse reports patient is agitated after NGT placement.  Awaiting CTh    1147:  Discussed with Dr King  Will give Ativan 1mg x1 dose for CT  Otherwise avoid sedating medications.    1315:  CTh stable        Snow Tsang, TELMA  Neurology  [facility] - [facility] ICU

## 2023-04-27 NOTE — PT/OT/SLP PROGRESS
Physical Therapy      Patient Name:  Marino Mccrary   MRN:  [id number]    Patient not seen today for PT eval. Attempted to see pt at 134 however spoke to RN who reported that pt is about to leave the floor for CT, requesting to hold until that is complete. Attempted again at 1517 and spoke to RN again who stated pt is lethargic and with decreased command following. PT to defer evaluation today. Will follow-up tomorrow/as appropriate.

## 2023-04-27 NOTE — HPI
Marino Mccrary is a 63 y.o. male who presented to [facility] on 4/26/2023 for schedule coil embolization of Left AMBER aneurysm.  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  He was admitted to ICU for post procedure monitoring.    PMHx:  right-sided MCA stroke with residual left-sided weakness and facial droop, CAD, HTN, HLD

## 2023-04-27 NOTE — PT/OT/SLP PROGRESS
SLP orders received, chart reviewed and nursing/neuro NP consulted.  Pt hospitalized in January of this year with AMS and seen by SLP for a clinical swallow evaluation with recs for Easy to Chew solids (due to lack of dentition) and thin liquids.  Pt now s/p embolization of LACA Aneurysm.  Attempted to complete clinical swallow evaluation.  Pt arousable with max verbal/tactile/thermal stimulation, however non-verbal and resistive to PO intake.  Will continue to follow and re-attempt as appropriate.

## 2023-04-27 NOTE — PLAN OF CARE
Problem: Adult Inpatient Plan of Care  Goal: Plan of Care Review  Outcome: Ongoing, Progressing  Goal: Patient-Specific Goal (Individualized)  Outcome: Ongoing, Progressing  Goal: Absence of Hospital-Acquired Illness or Injury  Outcome: Ongoing, Progressing  Goal: Optimal Comfort and Wellbeing  Outcome: Ongoing, Progressing  Goal: Readiness for Transition of Care  Outcome: Ongoing, Progressing     Problem: Fall Injury Risk  Goal: Absence of Fall and Fall-Related Injury  Outcome: Ongoing, Progressing     Problem: Infection  Goal: Absence of Infection Signs and Symptoms  Outcome: Ongoing, Progressing     Problem: Skin Injury Risk Increased  Goal: Skin Health and Integrity  Outcome: Ongoing, Progressing     Problem: Impaired Wound Healing  Goal: Optimal Wound Healing  Outcome: Ongoing, Progressing

## 2023-04-27 NOTE — ASSESSMENT & PLAN NOTE
s/p cerebral angiogram and embolization of Left AMBER aneurysm    STAT CTh   Will need to place NGT to ensure patient receives ASA and Plavix    Keep scheduled appointment with Dr King on May 17 at 11am.    The above findings, diagnostics, and treatment plan were discussed with Dr. King, who is in agreement with the plan of care.

## 2023-04-27 NOTE — SUBJECTIVE & OBJECTIVE
Subjective:     Interval History: Nurse reports increased confusion overnight.  STAT CTh showed no acute intracranial abnormality.  He was given Ativan 1mg during the night.    This morning, his is sleepy.  He will open his eyes and look toward verbal stimuli, but quickly falls back to sleep.  He does not answer any questions or follow commands.  Moves all extremities spontaneously.      Current Facility-Administered Medications   Medication Dose Route Frequency Provider Last Rate Last Admin    0.9%  NaCl infusion   Intravenous Continuous TELMA Bangura 75 mL/hr at 04/27/23 0453 New Bag at 04/27/23 0453    acetaminophen tablet 650 mg  650 mg Oral Q6H PRN TELMA Bangura        aspirin EC tablet 81 mg  81 mg Oral Daily TELMA Bangura   81 mg at 04/27/23 1210    clopidogreL tablet 75 mg  75 mg Oral Daily TELMA Bangura   75 mg at 04/27/23 1210    enalaprilat injection 1.25 mg  1.25 mg Intravenous Q6H PRN Gregorio Mera MD   1.25 mg at 04/27/23 0052    hydrALAZINE injection 10 mg  10 mg Intravenous Q4H PRN Gregorio Mera MD   10 mg at 04/27/23 0018    labetaloL injection 10 mg  10 mg Intravenous PRN Emmett Smith MD   10 mg at 04/26/23 2336    LIDOcaine (PF) 10 mg/ml (1%) injection 10 mg  1 mL Intradermal Once Edgar Ferguson DO        mupirocin 2 % ointment   Nasal BID Edgar Ferguson DO   Given at 04/27/23 1210    ondansetron injection 4 mg  4 mg Intravenous Q6H PRN TELMA Bangura           Review of Systems    Objective:     Vital Signs (Most Recent):  Temp: 98.3 °F (36.8 °C) (04/27/23 1200)  Pulse: 82 (04/27/23 1200)  Resp: (!) 21 (04/27/23 1200)  BP: (!) 111/58 (04/27/23 1200)  SpO2: 95 % (04/27/23 1145)   Vital Signs (24h Range):  Temp:  [98.3 °F (36.8 °C)-98.6 °F (37 °C)] 98.3 °F (36.8 °C)  Pulse:  [] 82  Resp:  [11-26] 21  SpO2:  [68 %-100 %] 95 %  BP: (103-167)/() 111/58  Arterial Line BP: (-)/(-50-78) 150/57     Weight: 74.8 kg (164 lb 14.5 oz)  Body  mass index is 25.07 kg/m².    Physical Exam  Constitutional:       General: He is not in acute distress.     Comments: Sleepy ... will open eyes and look toward verbal stimuli ... but very quick to go back to sleep   Eyes:      Extraocular Movements: Extraocular movements intact.      Pupils: Pupils are equal, round, and reactive to light.   Cardiovascular:      Rate and Rhythm: Normal rate and regular rhythm.   Pulmonary:      Effort: Pulmonary effort is normal.   Skin:     General: Skin is warm and dry.      Comments: Right groin puncture site with gauze/transparent dressing intact ... soft, flat, no hematoma   Neurological:      Comments: Nonverbal  Does not follow commands  Moves all extremities spontaneously         Significant Labs: BMP:   Recent Labs   Lab 04/26/23  1036 04/27/23  0748    137   K 4.1 3.8   CO2 25 23   BUN 18.7 12.6   CREATININE 0.84 0.69*   CALCIUM 9.0 8.3*     CBC:   Recent Labs   Lab 04/26/23  1036 04/27/23  0748   WBC 7.6 11.5   HGB 15.9 13.7*   HCT 49.4 41.4*    192       Significant Imaging: I have reviewed all pertinent imaging results/findings within the past 24 hours.

## 2023-04-27 NOTE — PROGRESS NOTES
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  When asked, patient is able to move his right upper extremity and right lower extremity with full range of motion.  Patient is not able to fully extend the left upper extremity and has trouble lifting the arm above his had.  The patient has some trouble with lifting the right lower extremity.  History is otherwise limited secondary to incomprehensible speech likely due to anesthetics, however, can not rule out brain pathology.    Hospital Course/Significant events:  04/26/2023:  Coil embolization of left AMBER aneurysm and admission to the ICU    24 Hour Interval History:  Patient has remained hemodynamically stable.  He is easily arousable but does not follow commands and rapidly falls back asleep.  He is moving all extremities.    Past Medical History:   Diagnosis Date    Acid reflux     Cerebral aneurysm     Heart disease     Hypertension     Mixed hyperlipidemia        Past Surgical History:   Procedure Laterality Date    BACK SURGERY      CARDIAC SURGERY      INSERT / REPLACE / REMOVE PACEMAKER Left     KNEE SURGERY         Social History     Socioeconomic History    Marital status:    Tobacco Use    Smoking status: Every Day     Packs/day: 1.00     Types: Cigarettes    Smokeless tobacco: Never   Substance and Sexual Activity    Alcohol use: Yes     Comment: 1/2 pint of whiskey every month     Drug use: Never           Current Outpatient Medications   Medication Instructions    amitriptyline (ELAVIL) 25 mg, Oral, Nightly    aspirin (ECOTRIN) 81 mg, Oral, Daily    atorvastatin (LIPITOR) 10 mg, Oral, Daily    clindamycin (CLEOCIN) 150 MG capsule SMARTSIG:3 Capsule(s) By Mouth Every 8 Hours    clopidogreL (PLAVIX) 75 mg, Oral, Daily    fluticasone propionate (FLONASE) 50 mcg/actuation nasal spray 1 spray, Each Nostril    metoprolol succinate (TOPROL-XL) 100 mg, Oral    pantoprazole (PROTONIX) 40 mg, Oral, Daily    QUEtiapine (SEROQUEL) 100 mg, Oral, Nightly    sacubitriL-valsartan (ENTRESTO) 24-26 mg per tablet 1 tablet, Oral, 2 times daily    scopolamine (TRANSDERM-SCOP) 1.3-1.5 mg (1 mg over 3 days) 1 patch, Transdermal, Every 72 hours    spironolactone (ALDACTONE) 25 mg, Oral, Daily    zolpidem (AMBIEN) 5 mg, Oral, Nightly PRN       Current Inpatient Medications   aspirin  81 mg Oral Daily    clopidogreL  75 mg Oral Daily    LIDOcaine (PF) 10 mg/ml (1%)  1 mL Intradermal Once    mupirocin   Nasal BID       Current Intravenous Infusions   sodium chloride 0.9% 75 mL/hr at 04/27/23 0453               Objective:       Intake/Output Summary (Last 24 hours) at 4/27/2023 1013  Last data filed at 4/27/2023 0600  Gross per 24 hour   Intake 1200.17 ml   Output 1600 ml   Net -399.83 ml           Vital Signs (Most Recent):  Temp: 98.4 °F (36.9 °C) (04/27/23 0800)  Pulse: 81 (04/27/23 0845)  Resp: (!) 21 (04/27/23 0845)  BP: (!) 110/56 (04/27/23 0845)  SpO2: 97 % (04/27/23 0845)  Body mass index is 25.07 kg/m².  Weight: 74.8 kg (164 lb 14.5 oz) Vital Signs (24h Range):  Temp:  [98.4 °F (36.9 °C)-98.6 °F (37 °C)] 98.4 °F (36.9 °C)  Pulse:  [58-91] 81  Resp:  [11-26] 21  SpO2:  [94 %-100 %] 97 %  BP: (103-155)/() 110/56  Arterial Line BP: (-)/(-50-78) -50/-50     Physical Exam  Constitutional:       General: He is not in acute distress.     Appearance: He is not ill-appearing, toxic-appearing or diaphoretic.    HENT:      Head: Normocephalic and atraumatic.   Eyes:      Extraocular Movements: Extraocular movements intact.      Conjunctiva/sclera: Conjunctivae normal.      Pupils: Pupils are equal, round, and reactive to light.   Cardiovascular:      Rate and Rhythm: Normal rate and regular rhythm.      Pulses: Normal pulses.      Heart sounds: Normal heart sounds. No murmur heard.    No friction rub. No gallop.   Pulmonary:      Effort: Pulmonary effort is normal. No respiratory distress.      Breath sounds: Normal breath sounds. No stridor. No wheezing, rhonchi or rales.   Chest:      Chest wall: No tenderness.   Abdominal:      General: Bowel sounds are normal. There is no distension.      Palpations: Abdomen is soft. There is no mass.      Tenderness: There is no abdominal tenderness. There is no guarding or rebound.      Hernia: No hernia is present.   Musculoskeletal:         General: No swelling or tenderness. Normal range of motion.      Comments: Right brace to the right lower extremity with a catheter was placed to the right groin   Skin:     General: Skin is warm.      Capillary Refill: Capillary refill takes less than 2 seconds.      Coloration: Skin is not jaundiced.      Findings: No erythema.   Neurological:      Comments: Patient is easily arousable.  He moves all extremities but does not follow commands.  Pupils are equal and responsive to light.  He is nonverbal.         Lines/Drains/Airways       Drain  Duration             Male External Urinary Catheter 04/27/23 0246 <1 day              Peripheral Intravenous Line  Duration                  Peripheral IV - Single Lumen 04/26/23 1015 20 G Left Antecubital <1 day                    Significant Labs:    Lab Results   Component Value Date    WBC 11.5 04/27/2023    HGB 13.7 (L) 04/27/2023    HCT 41.4 (L) 04/27/2023    MCV 87.2 04/27/2023     04/27/2023         BMP  Lab Results   Component Value Date     04/27/2023    K 3.8 04/27/2023     CHLORIDE 107 04/27/2023    CO2 23 04/27/2023    BUN 12.6 04/27/2023    CREATININE 0.69 (L) 04/27/2023    CALCIUM 8.3 (L) 04/27/2023    AGAP 10.0 04/26/2023       ABG  No results for input(s): PH, PO2, PCO2, HCO3, BE in the last 168 hours.    Mechanical Ventilation Support:       Significant Imaging:          Assessment/Plan:     Assessment  Status post coil embolization of an aneurysm to the left AMBER  History of CVA to the right MCA with residual left-sided upper and lower extremity weakness and left-sided facial droop  Hypertension      Plan  Care and recommendations per Neurology, appreciate recs  SBP below 130  Neurochecks Q 1  -neurology is aware of ongoing lethargy and nonverbal state.  Repeat CT scan of the head has been ordered  -will reassess speech and mental status hourly for improvement  Continue aspirin and clopidogrel per Neurology  Zofran as needed for nausea/vomiting    Close monitoring and evaluation in the ICU    DVT Prophylaxis:  SCD  GI Prophylaxis:  None     32 minutes of critical care was time spent personally by me on the following activities: development of treatment plan with patient or surrogate and bedside caregivers, discussions with consultants, evaluation of patient's response to treatment, examination of patient, ordering and performing treatments and interventions, ordering and review of laboratory studies, ordering and review of radiographic studies, pulse oximetry, re-evaluation of patient's condition.  This critical care time did not overlap with that of any other provider or involve time for any procedures.     Bon Pandey MD  Pulmonary Critical Care Medicine  [facility] - [facility]

## 2023-04-27 NOTE — NURSING
Nurses Note -- 4 Eyes      4/27/2023   6:10 AM      Skin assessed during: Q Shift Change      [x] No Altered Skin Integrity Present    [x]Prevention Measures Documented      [] Yes- Altered Skin Integrity Present or Discovered   [] LDA Added if Not in Epic (Describe Wound)   [] New Altered Skin Integrity was Present on Admit and Documented in LDA   [] Wound Image Taken    Wound Care Consulted? No    Attending Nurse:  Naila Gomez RN     Second RN/Staff Member:   kelsea paulino

## 2023-04-28 LAB
ALBUMIN SERPL-MCNC: 3.7 G/DL (ref 3.4–4.8)
ALBUMIN/GLOB SERPL: 1.3 RATIO (ref 1.1–2)
ALP SERPL-CCNC: 64 UNIT/L (ref 40–150)
ALT SERPL-CCNC: 26 UNIT/L (ref 0–55)
AST SERPL-CCNC: 29 UNIT/L (ref 5–34)
BASOPHILS # BLD AUTO: 0.03 X10(3)/MCL (ref 0–0.2)
BASOPHILS NFR BLD AUTO: 0.3 %
BILIRUBIN DIRECT+TOT PNL SERPL-MCNC: 1.4 MG/DL
BUN SERPL-MCNC: 11.5 MG/DL (ref 8.4–25.7)
CALCIUM SERPL-MCNC: 8.3 MG/DL (ref 8.8–10)
CHLORIDE SERPL-SCNC: 106 MMOL/L (ref 98–107)
CO2 SERPL-SCNC: 21 MMOL/L (ref 23–31)
CREAT SERPL-MCNC: 0.7 MG/DL (ref 0.73–1.18)
EOSINOPHIL # BLD AUTO: 0.02 X10(3)/MCL (ref 0–0.9)
EOSINOPHIL NFR BLD AUTO: 0.2 %
ERYTHROCYTE [DISTWIDTH] IN BLOOD BY AUTOMATED COUNT: 15.1 % (ref 11.5–17)
GFR SERPLBLD CREATININE-BSD FMLA CKD-EPI: >60 MLS/MIN/1.73/M2
GLOBULIN SER-MCNC: 2.8 GM/DL (ref 2.4–3.5)
GLUCOSE SERPL-MCNC: 110 MG/DL (ref 82–115)
HCT VFR BLD AUTO: 42.9 % (ref 42–52)
HGB BLD-MCNC: 14.4 G/DL (ref 14–18)
IMM GRANULOCYTES # BLD AUTO: 0.04 X10(3)/MCL (ref 0–0.04)
IMM GRANULOCYTES NFR BLD AUTO: 0.4 %
LYMPHOCYTES # BLD AUTO: 2.38 X10(3)/MCL (ref 0.6–4.6)
LYMPHOCYTES NFR BLD AUTO: 22 %
MAGNESIUM SERPL-MCNC: 2.3 MG/DL (ref 1.6–2.6)
MCH RBC QN AUTO: 29 PG (ref 27–31)
MCHC RBC AUTO-ENTMCNC: 33.6 G/DL (ref 33–36)
MCV RBC AUTO: 86.3 FL (ref 80–94)
MONOCYTES # BLD AUTO: 1.12 X10(3)/MCL (ref 0.1–1.3)
MONOCYTES NFR BLD AUTO: 10.3 %
NEUTROPHILS # BLD AUTO: 7.25 X10(3)/MCL (ref 2.1–9.2)
NEUTROPHILS NFR BLD AUTO: 66.8 %
NRBC BLD AUTO-RTO: 0 %
PHOSPHATE SERPL-MCNC: 4 MG/DL (ref 2.3–4.7)
PLATELET # BLD AUTO: 190 X10(3)/MCL (ref 130–400)
PMV BLD AUTO: 10.4 FL (ref 7.4–10.4)
POTASSIUM SERPL-SCNC: 4.3 MMOL/L (ref 3.5–5.1)
PROT SERPL-MCNC: 6.5 GM/DL (ref 5.8–7.6)
RBC # BLD AUTO: 4.97 X10(6)/MCL (ref 4.7–6.1)
SODIUM SERPL-SCNC: 136 MMOL/L (ref 136–145)
WBC # SPEC AUTO: 10.8 X10(3)/MCL (ref 4.5–11.5)

## 2023-04-28 PROCEDURE — 83735 ASSAY OF MAGNESIUM: CPT | Performed by: FAMILY MEDICINE

## 2023-04-28 PROCEDURE — 92611 MOTION FLUOROSCOPY/SWALLOW: CPT

## 2023-04-28 PROCEDURE — 25000003 PHARM REV CODE 250: Performed by: INTERNAL MEDICINE

## 2023-04-28 PROCEDURE — 92610 EVALUATE SWALLOWING FUNCTION: CPT

## 2023-04-28 PROCEDURE — 25000003 PHARM REV CODE 250

## 2023-04-28 PROCEDURE — 84100 ASSAY OF PHOSPHORUS: CPT | Performed by: FAMILY MEDICINE

## 2023-04-28 PROCEDURE — 11000001 HC ACUTE MED/SURG PRIVATE ROOM

## 2023-04-28 PROCEDURE — 63600175 PHARM REV CODE 636 W HCPCS: Performed by: FAMILY MEDICINE

## 2023-04-28 PROCEDURE — 25000003 PHARM REV CODE 250: Performed by: NURSE PRACTITIONER

## 2023-04-28 PROCEDURE — 80053 COMPREHEN METABOLIC PANEL: CPT | Performed by: FAMILY MEDICINE

## 2023-04-28 PROCEDURE — 97162 PT EVAL MOD COMPLEX 30 MIN: CPT

## 2023-04-28 PROCEDURE — 85025 COMPLETE CBC W/AUTO DIFF WBC: CPT | Performed by: FAMILY MEDICINE

## 2023-04-28 RX ORDER — ATORVASTATIN CALCIUM 10 MG/1
10 TABLET, FILM COATED ORAL DAILY
Status: DISCONTINUED | OUTPATIENT
Start: 2023-04-29 | End: 2023-05-15 | Stop reason: HOSPADM

## 2023-04-28 RX ORDER — METOPROLOL TARTRATE 25 MG/1
25 TABLET, FILM COATED ORAL 2 TIMES DAILY
Status: DISCONTINUED | OUTPATIENT
Start: 2023-04-28 | End: 2023-05-03

## 2023-04-28 RX ADMIN — SACUBITRIL AND VALSARTAN 1 TABLET: 24; 26 TABLET, FILM COATED ORAL at 08:04

## 2023-04-28 RX ADMIN — ASPIRIN 81 MG: 81 TABLET, COATED ORAL at 10:04

## 2023-04-28 RX ADMIN — LABETALOL HYDROCHLORIDE 10 MG: 5 INJECTION, SOLUTION INTRAVENOUS at 04:04

## 2023-04-28 RX ADMIN — METOPROLOL TARTRATE 25 MG: 25 TABLET, FILM COATED ORAL at 10:04

## 2023-04-28 RX ADMIN — CLOPIDOGREL BISULFATE 75 MG: 75 TABLET ORAL at 10:04

## 2023-04-28 RX ADMIN — MUPIROCIN: 20 OINTMENT TOPICAL at 08:04

## 2023-04-28 RX ADMIN — METOPROLOL TARTRATE 25 MG: 25 TABLET, FILM COATED ORAL at 08:04

## 2023-04-28 RX ADMIN — MUPIROCIN: 20 OINTMENT TOPICAL at 10:04

## 2023-04-28 RX ADMIN — HYDRALAZINE HYDROCHLORIDE 10 MG: 20 INJECTION INTRAMUSCULAR; INTRAVENOUS at 05:04

## 2023-04-28 NOTE — PLAN OF CARE
Problem: Physical Therapy  Goal: Physical Therapy Goal  Description: Goals to be met by: 23     Patient will increase functional independence with mobility by performin. Supine to sit with Stand-by Assistance  2. Sit to supine with Stand-by Assistance  3. Sit to stand transfer with Stand-by Assistance  4. Gait  x 150 feet with Stand-by Assistance using No Assistive Device.     Outcome: Ongoing, Progressing

## 2023-04-28 NOTE — NURSING
Nurses Note -- 4 Eyes      4/28/2023   5:29 PM      Skin assessed during: Q Shift Change      [] No Altered Skin Integrity Present    []Prevention Measures Documented      [x] Yes- Altered Skin Integrity Present or Discovered   [] LDA Added if Not in Epic (Describe Wound)   [] New Altered Skin Integrity was Present on Admit and Documented in LDA   [] Wound Image Taken    Wound Care Consulted? No    Attending Nurse:  Shy Arenas RN     Second RN/Staff Member:  REMY Farfan

## 2023-04-28 NOTE — ASSESSMENT & PLAN NOTE
s/p cerebral angiogram and embolization of Left AMBER aneurysm    Continued improvement   Would benefit from therapy eval (baseline left sided weakness prior to admission)  Will need NGT and meds (ASA and Plavix) until he is cleared for oral intake    Keep scheduled appointment with Dr King on May 17 at 11am.    The above findings, diagnostics, and treatment plan were discussed with Dr. King, who is in agreement with the plan of care.

## 2023-04-28 NOTE — SUBJECTIVE & OBJECTIVE
Subjective:     Interval History:   Appears to be improved today compared to previous days, he is interactive, lethargic but participates.       Current Neurological Medications:     Current Facility-Administered Medications   Medication Dose Route Frequency Provider Last Rate Last Admin    0.9%  NaCl infusion   Intravenous Continuous TELMA Bangura 75 mL/hr at 04/27/23 0453 New Bag at 04/27/23 0453    acetaminophen tablet 650 mg  650 mg Oral Q6H PRN TELMA Bangura        aspirin EC tablet 81 mg  81 mg Oral Daily TELMA Bangura   81 mg at 04/28/23 1000    clopidogreL tablet 75 mg  75 mg Oral Daily TELMA Bangura   75 mg at 04/28/23 1000    enalaprilat injection 1.25 mg  1.25 mg Intravenous Q6H PRN Gregorio Mera MD   1.25 mg at 04/27/23 1420    hydrALAZINE injection 10 mg  10 mg Intravenous Q4H PRN Gregorio Mera MD   10 mg at 04/28/23 0558    labetaloL injection 10 mg  10 mg Intravenous PRN Emmett Smith MD   10 mg at 04/28/23 0415    LIDOcaine (PF) 10 mg/ml (1%) injection 10 mg  1 mL Intradermal Once Edgar Ferguson DO        metoprolol tartrate (LOPRESSOR) tablet 25 mg  25 mg Oral BID Bon Pandey MD   25 mg at 04/28/23 1021    mupirocin 2 % ointment   Nasal BID Edgar Ferguson DO   Given at 04/28/23 1000    ondansetron injection 4 mg  4 mg Intravenous Q6H PRN TELMA Bangura           Review of Systems   Unable to perform ROS: Acuity of condition (limited information 2/2 clinical condition)   Objective:     Vital Signs (Most Recent):  Temp: 98.1 °F (36.7 °C) (04/28/23 0800)  Pulse: 71 (04/28/23 1100)  Resp: 15 (04/28/23 1100)  BP: 109/64 (04/28/23 1100)  SpO2: (!) 94 % (04/28/23 1100)   Vital Signs (24h Range):  Temp:  [97.9 °F (36.6 °C)-98.5 °F (36.9 °C)] 98.1 °F (36.7 °C)  Pulse:  [64-95] 71  Resp:  [14-24] 15  SpO2:  [93 %-98 %] 94 %  BP: (109-164)/() 109/64  Arterial Line BP: ()/(31-72) 147/70     Weight: 74.8 kg (164 lb 14.5 oz)  Body mass index is  25.07 kg/m².    Physical Exam  Vitals and nursing note reviewed.   Constitutional:       General: He is not in acute distress.     Appearance: He is not toxic-appearing or diaphoretic.   HENT:      Head: Normocephalic and atraumatic.   Eyes:      General: No visual field deficit.     Pupils: Pupils are equal, round, and reactive to light.   Pulmonary:      Effort: Pulmonary effort is normal.   Musculoskeletal:         General: Normal range of motion.      Cervical back: Normal range of motion.      Right lower leg: No edema.      Left lower leg: No edema.   Skin:     General: Skin is warm and dry.      Capillary Refill: Capillary refill takes less than 2 seconds.   Neurological:      Cranial Nerves: Dysarthria and facial asymmetry (mild chronic left facial droop) present.      Motor: Weakness (mild LUE, LLE weakness) and pronator drift (mild LUE drift) present. No tremor, atrophy, abnormal muscle tone or seizure activity.   Psychiatric:         Speech: Speech is slurred.       NEUROLOGICAL EXAMINATION:     MENTAL STATUS   Speech: slurred (Slowed, slurred speech   )  Level of consciousness: drowsy, arousable by verbal stimuli ,  arousable by tactile stimuli    CRANIAL NERVES     CN III, IV, VI   Pupils are equal, round, and reactive to light.    Significant Labs:   Recent Lab Results         04/28/23  0707   04/28/23  0259   04/27/23  1539        Albumin/Globulin Ratio   1.3         Albumin   3.7         Alkaline Phosphatase   64         ALT   26         Anion Gap     10.0       AST   29         Baso # 0.03           Basophil % 0.3           BILIRUBIN TOTAL   1.4         BUN   11.5   13.2       BUN/CREAT RATIO     19       Calcium   8.3   7.9       Chloride   106   106       CO2   21   20       Creatinine   0.70   0.68       eGFR   >60   >60       Eos # 0.02           Eosinophil % 0.2           Globulin, Total   2.8         Glucose   110   108       Hematocrit 42.9           Hemoglobin 14.4           Immature Grans  (Abs) 0.04           Immature Granulocytes 0.4           Lymph # 2.38           LYMPH % 22.0           Magnesium   2.30   1.60       MCH 29.0           MCHC 33.6           MCV 86.3           Mono # 1.12           Mono % 10.3           MPV 10.4           Neut # 7.25           Neut % 66.8           nRBC 0.0           Phosphorus   4.0   1.9       Platelets 190           Potassium   4.3   3.6       PROTEIN TOTAL   6.5         RBC 4.97           RDW 15.1           Sodium   136   136       WBC 10.8                   Significant Imaging: I have reviewed all pertinent imaging results/findings within the past 24 hours.

## 2023-04-28 NOTE — PLAN OF CARE
Problem: SLP  Goal: SLP Goal  Description:   LTG: Tolerate least restrictive PO diet with no clinical signs/sx aspiration  STGs:  1.  Complete base of tongue and laryngeal strengthening exercises with minimal cues  2.  Tolerate thermal stimulation to the anterior faucial pillars with 100% effortful swallow responses and delay less than 2 seconds.    Outcome: Ongoing, Progressing

## 2023-04-28 NOTE — PT/OT/SLP EVAL
Physical Therapy Evaluation    Patient Name:  Marino Mccrary   MRN:  [id number]    Recommendations:     Discharge Recommendations:  (placement vs home penidng progress; might benefit from placement since he lives alone)   Discharge Equipment Recommendations: none   Barriers to discharge:  medical dx, impaired mobility     Assessment:     Marino Mccrary is a 63 y.o. male admitted with a medical diagnosis of L AMBER aneurysm s/p coil embolization. Pt with hx of R MCA CVA. He presents with the following impairments/functional limitations: weakness, gait instability, impaired endurance, impaired balance, decreased lower extremity function, impaired self care skills, impaired functional mobility.    Rehab Prognosis: Good; patient would benefit from acute skilled PT services to address these deficits and reach maximum level of function.    Recent Surgery: * No procedures listed * 2 Days Post-Op    Plan:     During this hospitalization, patient to be seen 6 x/week to address the identified rehab impairments via gait training, therapeutic activities, therapeutic exercises and progress toward the following goals:    Plan of Care Expires:  05/28/23    Subjective     Chief Complaint: n/a  Patient/Family Comments/goals: to return PLOF  Pain/Comfort:  Pain Rating 1: 0/10    Patients cultural, spiritual, Gnosticist conflicts given the current situation: no    Living Environment:  Pt lives alone in a H  Prior to admission, patients level of function was independent.    Equipment used at home: none.  DME owned (not currently used): none.    Upon discharge, patient will have assistance from family.    Objective:     Communicated with NSG prior to session.  Patient found HOB elevated with blood pressure cuff, pulse ox (continuous), telemetry, arterial line, NG tube, peripheral IV, PureWick, B mittens, roll belt  upon PT entry to room.    General Precautions: Standard, fall  Orthopedic Precautions:N/A   Braces: N/A  Respiratory  Status: Room air  Blood Pressure: 144/85, Heart rate: 78      Exams:  Cognitive Exam:  Patient is oriented to Person, Place, Time, and Situation  RLE Strength: grossly 5/5  LLE Strength: grossly 4/5  Skin integrity: Visible skin intact      Functional Mobility:  Bed Mobility:     Supine to Sit: minimum assistance  Transfers:     Sit to Stand:  minimum assistance with no AD  Gait: pt ambulated approx 110 ft without use of an AD but used R UE on IV pole for stability; pt demonstrated NBOS with B decreased step length and foot clearance, slightly veering to the L; Nicole required for stability 2/2 pt with intermittently decreased balance       Patient provided with verbal education regarding POC, mobility, and safety.  Understanding was verbalized.     Patient left up in chair with all lines intact, call button in reach, RN notified, and sibling present.  Spoke to RN who reported that pt can be without restraints at this time.     GOALS:   Multidisciplinary Problems       Physical Therapy Goals          Problem: Physical Therapy    Goal Priority Disciplines Outcome Goal Variances Interventions   Physical Therapy Goal     PT, PT/OT Ongoing, Progressing     Description: Goals to be met by: 23     Patient will increase functional independence with mobility by performin. Supine to sit with Stand-by Assistance  2. Sit to supine with Stand-by Assistance  3. Sit to stand transfer with Stand-by Assistance  4. Gait  x 150 feet with Stand-by Assistance using No Assistive Device.                          History:     Past Medical History:   Diagnosis Date    Acid reflux     Cerebral aneurysm     Heart disease     Hypertension     Mixed hyperlipidemia        Past Surgical History:   Procedure Laterality Date    BACK SURGERY      CARDIAC SURGERY      INSERT / REPLACE / REMOVE PACEMAKER Left     KNEE SURGERY         Time Tracking:     PT Received On: 23  PT Start Time: 938     PT Stop Time: 1004  PT Total Time  (min): 26 min     Billable Minutes: Evaluation mod      04/28/2023

## 2023-04-28 NOTE — PROGRESS NOTES
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 Had some increased confusion overnight, CT head was negative and was given 1 mg IV ativan during the night  4/27 was nonverbal, moving all extremities, but very lethargic           Subjective:     Interval History:   Appears to be improved today compared to previous days, he is interactive, lethargic but participates.       Current Neurological Medications:     Current Facility-Administered Medications   Medication Dose Route Frequency Provider Last Rate Last Admin    0.9%  NaCl infusion   Intravenous Continuous TELMA Bangura 75 mL/hr at 04/27/23 0453 New Bag at 04/27/23 0453    acetaminophen tablet 650 mg  650 mg Oral Q6H PRN TELMA Bangura        aspirin EC tablet 81 mg  81 mg Oral Daily TELMA Bangura   81 mg at 04/28/23 1000    clopidogreL tablet 75 mg  75 mg Oral Daily TELMA Bangura   75 mg at 04/28/23 1000    enalaprilat injection 1.25 mg  1.25 mg Intravenous Q6H PRN Gregorio Mera MD   1.25 mg at 04/27/23 1420    hydrALAZINE injection 10 mg  10 mg Intravenous Q4H PRN Gregorio Mera MD   10 mg at 04/28/23 0558    labetaloL injection 10 mg  10 mg Intravenous PRN Emmett Smith MD   10 mg at 04/28/23 0415    LIDOcaine (PF) 10 mg/ml (1%) injection 10 mg  1 mL Intradermal Once Edgar Ferguson DO        metoprolol tartrate (LOPRESSOR) tablet 25 mg  25 mg Oral BID Bon Pandey MD   25 mg at 04/28/23 1021    mupirocin 2 % ointment   Nasal BID Edgar Ferguson DO   Given at 04/28/23 1000    ondansetron injection 4 mg  4 mg  Intravenous Q6H PRN TELMA Bangura           Review of Systems   Unable to perform ROS: Acuity of condition (limited information 2/2 clinical condition)   Objective:     Vital Signs (Most Recent):  Temp: 98.1 °F (36.7 °C) (04/28/23 0800)  Pulse: 71 (04/28/23 1100)  Resp: 15 (04/28/23 1100)  BP: 109/64 (04/28/23 1100)  SpO2: (!) 94 % (04/28/23 1100)   Vital Signs (24h Range):  Temp:  [97.9 °F (36.6 °C)-98.5 °F (36.9 °C)] 98.1 °F (36.7 °C)  Pulse:  [64-95] 71  Resp:  [14-24] 15  SpO2:  [93 %-98 %] 94 %  BP: (109-164)/() 109/64  Arterial Line BP: ()/(31-72) 147/70     Weight: 74.8 kg (164 lb 14.5 oz)  Body mass index is 25.07 kg/m².    Physical Exam  Vitals and nursing note reviewed.   Constitutional:       General: He is not in acute distress.     Appearance: He is not toxic-appearing or diaphoretic.   HENT:      Head: Normocephalic and atraumatic.   Eyes:      General: No visual field deficit.     Pupils: Pupils are equal, round, and reactive to light.   Pulmonary:      Effort: Pulmonary effort is normal.   Musculoskeletal:         General: Normal range of motion.      Cervical back: Normal range of motion.      Right lower leg: No edema.      Left lower leg: No edema.   Skin:     General: Skin is warm and dry.      Capillary Refill: Capillary refill takes less than 2 seconds.   Neurological:      Cranial Nerves: Dysarthria and facial asymmetry (mild chronic left facial droop) present.      Motor: Weakness (mild LUE, LLE weakness) and pronator drift (mild LUE drift) present. No tremor, atrophy, abnormal muscle tone or seizure activity.   Psychiatric:         Speech: Speech is slurred.       NEUROLOGICAL EXAMINATION:     MENTAL STATUS   Speech: slurred (Slowed, slurred speech   )  Level of consciousness: drowsy, arousable by verbal stimuli ,  arousable by tactile stimuli    CRANIAL NERVES     CN III, IV, VI   Pupils are equal, round, and reactive to light.    Significant Labs:   Recent Lab Results          04/28/23  0707   04/28/23  0259   04/27/23  1539        Albumin/Globulin Ratio   1.3         Albumin   3.7         Alkaline Phosphatase   64         ALT   26         Anion Gap     10.0       AST   29         Baso # 0.03           Basophil % 0.3           BILIRUBIN TOTAL   1.4         BUN   11.5   13.2       BUN/CREAT RATIO     19       Calcium   8.3   7.9       Chloride   106   106       CO2   21   20       Creatinine   0.70   0.68       eGFR   >60   >60       Eos # 0.02           Eosinophil % 0.2           Globulin, Total   2.8         Glucose   110   108       Hematocrit 42.9           Hemoglobin 14.4           Immature Grans (Abs) 0.04           Immature Granulocytes 0.4           Lymph # 2.38           LYMPH % 22.0           Magnesium   2.30   1.60       MCH 29.0           MCHC 33.6           MCV 86.3           Mono # 1.12           Mono % 10.3           MPV 10.4           Neut # 7.25           Neut % 66.8           nRBC 0.0           Phosphorus   4.0   1.9       Platelets 190           Potassium   4.3   3.6       PROTEIN TOTAL   6.5         RBC 4.97           RDW 15.1           Sodium   136   136       WBC 10.8                   Significant Imaging: I have reviewed all pertinent imaging results/findings within the past 24 hours.    Assessment and Plan:     Unruptured cerebral aneurysm  s/p cerebral angiogram and embolization of Left AMBER aneurysm    Continued improvement   Would benefit from therapy eval (baseline left sided weakness prior to admission)  Will need NGT and meds (ASA and Plavix) until he is cleared for oral intake    Keep scheduled appointment with Dr King on May 17 at 11am.    The above findings, diagnostics, and treatment plan were discussed with Dr. King, who is in agreement with the plan of care.     Further recommendations to follow from MD    VTE Risk Mitigation (From admission, onward)    None          Lennie Heath, NP  Neurology  [facility]  ICU

## 2023-04-28 NOTE — PT/OT/SLP EVAL
Speech Language Pathology Department  Clinical Swallow Evaluation    Patient Name:  Marino Mccrary   MRN:  [id number]    Recommendations:     General recommendations:  Modified Barium Swallow Study  Diet recommendations:  NPO  Precautions: Standard    History:     Marino Mccrary is a/n 63 y.o. male admitted s/p embolization of LACA Aneurysm.  Pt's sister reports hx of multiple strokes.    Past Medical History:   Diagnosis Date    Acid reflux     Cerebral aneurysm     Heart disease     Hypertension     Mixed hyperlipidemia      Past Surgical History:   Procedure Laterality Date    BACK SURGERY      CARDIAC SURGERY      INSERT / REPLACE / REMOVE PACEMAKER Left     KNEE SURGERY       Home Diet: Regular and thin liquids  Current Method of Nutrition: NPO    Patient complaint: sister reports coughing with PO intake at home    Subjective     Patient awake, alert, cooperative, and flat.    Patient goals: to eat/drink     Spiritual/Cultural/Alevism Beliefs/Practices that affect care: no  Pain/Comfort: Pain Rating 1: 0/10    Respiratory Status: room air    Objective:     Oral Musculature Evaluation  Oral Musculature: general weakness  Secretion Management: coughing on secretions  Volitional Cough: productive  Voice Prior to PO Intake: hoarse    Consistency Fed By Oral Symptoms Pharyngeal Symptoms   Thin liquid by spoon SLP Decreased lip closure Multiple swallows  Wet vocal quality after swallow   Puree SLP Slowed oral transit time Multiple swallows  Cough after swallow     Assessment:     Pt presents with signs/sx oropharyngeal dysphagia warranting comprehensive assessment of swallow function to determine safety of PO intake.    Goals:     Multidisciplinary Problems       SLP Goals       Not on file                  Patient Education:     Patient and his sister provided with verbal education regarding SLP POC, swallow function, and risks of aspiration.  Understanding was verbalized.    Plan:     SLP Follow-Up:  Yes    Plan of Care reviewed with:  patient      Time Tracking:     SLP Treatment Date:   04/28/23  Speech Start Time:  1000  Speech Stop Time:  1015     Speech Total Time (min):  15 min    Billable minutes:  Swallow and Oral Function Evaluation, 15 minutes      04/28/2023

## 2023-04-28 NOTE — ANESTHESIA POSTPROCEDURE EVALUATION
Anesthesia Post Evaluation    Patient: Marino Mccrary    Procedure(s) Performed: * No procedures listed *    Final Anesthesia Type: general      Patient location during evaluation: ICU  Patient participation: Yes- Able to Participate  Level of consciousness: awake and alert  Post-procedure vital signs: reviewed and stable  Pain management: adequate  Airway patency: patent    PONV status at discharge: No PONV  Anesthetic complications: no      Cardiovascular status: blood pressure returned to baseline  Respiratory status: spontaneous ventilation and nasal cannula  Hydration status: euvolemic  Follow-up not needed.          Vitals Value Taken Time   /68 04/28/23 1021   Temp 36.7 °C (98.1 °F) 04/28/23 0800   Pulse 63 04/28/23 1044   Resp 24 04/28/23 1044   SpO2 94 % 04/28/23 1044   Vitals shown include unvalidated device data.      No case tracking events are documented in the log.      Pain/Paresh Score: No data recorded

## 2023-04-28 NOTE — PROCEDURES
Speech Language Pathology Department  Modified Barium Swallow Study    Patient Name:  Marino Mccrary   MRN:  [id number]    Recommendations:     General recommendations:  dysphagia therapy  Repeat MBS study: 5-7 days  Diet recommendations:  NPO  General precautions: Standard, aspiration    History/Reason for Referral:     Marino Mccrary is a/n 63 y.o. male admitted s/p embolization of LACA Aneurysm.  Pt's sister reports hx of multiple strokes.    Past Medical History:   Diagnosis Date    Acid reflux     Cerebral aneurysm     Heart disease     Hypertension     Mixed hyperlipidemia      Past Surgical History:   Procedure Laterality Date    BACK SURGERY      CARDIAC SURGERY      INSERT / REPLACE / REMOVE PACEMAKER Left     KNEE SURGERY       A Modified Barium Swallow Study was completed to assess the efficiency of his/her swallow function, rule out aspiration and make recommendations regarding safe dietary consistencies, effective compensatory strategies, and safe eating environment.     Home Diet: Regular and thin liquids  Current Method of Nutrition: NPO    Patient complaint:  sister reports coughing with PO intake at home    Subjective:     Patient awake, alert, and flat, but confused.    Spiritual/Cultural/Tenriism Beliefs/Practices that affect care: no  Pain/Comfort: Pain Rating 1: 0/10    Respiratory Status: room air    Fluoroscopic Results:     Oral Musculature Evaluation:  Oral Musculature: general weakness  Secretion Management: coughing on secretions  Volitional Cough: productive  Voice Prior to PO Intake: hoarse    Visualization  Patient was seen in the lateral view    Oral Phase:   Reduced lip closure  Reduced lip closure around utensil/straw  Weak sucking (pt unable to pull mildly thick liquid from straw)  Bolus holding  Prolonged/disorganized bolus formation  Poor anterior-posterior transport  Tongue pumping  Poor bolus cohesion  Loss of bolus control with liquids    Pharyngeal Phase:   Swallow delay  with spill to the pyriform sinuses  Reduced base of tongue retraction  Adequate epiglottic deflection  Reduced hyolaryngeal excursion  Poor airway protection  Moderate pyriform sinus residue with puree  Consistency Laryngeal Penetration Aspiration   Mildly thick liquid by spoon None None   Puree None None   Thin liquid by straw During the swallow After the swallow  Cough response present/NOT productive     Cervical Esophageal Phase:   UES appeared to accommodate all bolus types without stasis or retrograde movement visualized    Compensatory Strategies:  Compensatory strategies were not attempted due to cognitive impairments    Additional Comments:  Additional consistencies were not tested due to cognitive impairments    Assessment:     Pt exhibited moderate-severe oral and mild-moderate pharyngeal dysphagia resulting in poor airway protection with aspiration of thin liquids    Oral Phase: Lip closure was reduced with no anterior spillage.  Bolus preparation and mastication were prolonged with tongue pumping.  Bolus transport delayed due to bolus holding.  There was no significant oral residue.  Bolus control was poor.    Pharyngeal Phase: The swallow was initiated after the bolus entered the pyriform sinuses  The soft palate elevated for adequate closure of the velopharyngeal port.  Tongue base retraction was reduced.  Epiglottic deflection appeared to be complete.  Hyolaryngeal excursion was reduced.  Airway protection reduced.    Goals:     Multidisciplinary Problems       SLP Goals          Problem: SLP    Goal Priority Disciplines Outcome   SLP Goal     SLP Ongoing, Progressing   Description:   LTG: Tolerate least restrictive PO diet with no clinical signs/sx aspiration  STGs:  1.  Complete base of tongue and laryngeal strengthening exercises with minimal cues  2.  Tolerate thermal stimulation to the anterior faucial pillars with 100% effortful swallow responses and delay less than 2 seconds.                        Patient Education:     Patient provided with verbal education regarding study results/recs.  Additional teaching is warranted.    Plan:     SLP Follow-Up:  Yes    Patient to be seen:  daily   Plan of Care expires:  05/05/23  Plan of Care reviewed with:  patient     Time Tracking:     SLP Treatment Date:   04/28/23  Speech Start Time:  1400  Speech Stop Time:  1420     Speech Total Time (min):  20 min    Billable minutes:   Motion Fluoroscopic Evaluation, Video Recording, 20 minutes      04/28/2023

## 2023-04-28 NOTE — H&P
[facility] Hospital Medicine History & Physical Examination       Patient Name: Marino Mccrary  MRN: [id number]  Patient Class: IP- Inpatient   Admission Date: 4/26/2023   Admitting Physician: ELDER Service   Length of Stay: 2  Attending Physician: Kobe Antoine MD  Primary Care Provider: TERESSA Andrews MD  Face-to-Face encounter date: 04/28/2023  Code Status: Full  Chief Complaint: post procedure admission to ICU        Patient information was obtained from patient, past medical records and ICU records .     HISTORY OF PRESENT ILLNESS:   Marino Mccrary is a 63 y.o. 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 King.  Patient was admitted to ICU postprocedure for monitoring.  He had a delayed recovery from anesthesia.  Patient has a history of right MCA stroke with left-sided residual weakness.  At some point while he was still under the effect of anesthesia, he was unable to move his right lower extremity which has since resolved.  Patient still have weakness in the left upper extremity, which per ICU nurse, his sister confirmed that it is residual from his previous stroke.  Sister reported that patient lives alone but he is pretty functional given he cooks for himself, does his grocery shopping, drives and mows his yard as well.    During ICU stay, overnight, had some confusion, CT head repeated was negative.  Patient did receive IV Ativan.    Patient speech is also dysarthric from his previous stroke.  Sister confirmed that every time patient eat or drink something he coughs.  That made as concern for swallow related issues and concern for aspiration as well.  Speech was  consulted  Physical therapy and occupational therapy also consulted    Upon my evaluation, patient was able to give me some information but not all, his speech is dysarthric and I could not understand everything he was saying.  Some questions he was unable to answer me at all.  He did ask me when can he get out of here    No family is at bedside.  Patient's nurse is with me in the room upon evaluation.    PAST MEDICAL HISTORY:     Past Medical History:   Diagnosis Date    Acid reflux     Cerebral aneurysm     Heart disease     Hypertension     Mixed hyperlipidemia        PAST SURGICAL HISTORY:     Past Surgical History:   Procedure Laterality Date    BACK SURGERY      CARDIAC SURGERY      INSERT / REPLACE / REMOVE PACEMAKER Left     KNEE SURGERY         ALLERGIES:   Patient has no known allergies.    FAMILY HISTORY:   Reviewed and negative    SOCIAL HISTORY:     Social History     Tobacco Use    Smoking status: Every Day     Packs/day: 1.00     Types: Cigarettes    Smokeless tobacco: Never   Substance Use Topics    Alcohol use: Yes     Comment: 1/2 pint of delfin every month        HOME MEDICATIONS:     Prior to Admission medications    Medication Sig Start Date End Date Taking? Authorizing Provider   aspirin (ECOTRIN) 81 MG EC tablet Take 81 mg by mouth once daily.   Yes Historical Provider   atorvastatin (LIPITOR) 10 MG tablet Take 10 mg by mouth once daily.   Yes Historical Provider   clopidogreL (PLAVIX) 75 mg tablet Take 75 mg by mouth once daily.   Yes Historical Provider   metoprolol succinate (TOPROL-XL) 100 MG 24 hr tablet Take 100 mg by mouth. 1/20/23  Yes Historical Provider   QUEtiapine (SEROQUEL) 100 MG Tab Take 100 mg by mouth nightly. 4/14/23  Yes Historical Provider   sacubitriL-valsartan (ENTRESTO) 24-26 mg per tablet Take 1 tablet by mouth 2 (two) times daily.   Yes Historical Provider   spironolactone (ALDACTONE) 25 MG tablet Take 25 mg by mouth once daily. 4/14/23  Yes Historical Provider    zolpidem (AMBIEN) 5 MG Tab Take 5 mg by mouth nightly as needed. 4/18/23  Yes Historical Provider   amitriptyline (ELAVIL) 50 MG tablet Take 25 mg by mouth every evening.    Historical Provider   clindamycin (CLEOCIN) 150 MG capsule SMARTSIG:3 Capsule(s) By Mouth Every 8 Hours 1/14/23   Historical Provider   fluticasone propionate (FLONASE) 50 mcg/actuation nasal spray 1 spray by Each Nostril route.    Historical Provider   pantoprazole (PROTONIX) 40 MG tablet Take 1 tablet (40 mg total) by mouth once daily. 1/12/23 3/13/23  Bobby Martinez MD   scopolamine (TRANSDERM-SCOP) 1.3-1.5 mg (1 mg over 3 days) Place 1 patch onto the skin every 72 hours.    Historical Provider       REVIEW OF SYSTEMS:   Except as documented, all other systems reviewed and negative     PHYSICAL EXAM:     VITAL SIGNS: 24 HRS MIN & MAX LAST   Temp  Min: 97.9 °F (36.6 °C)  Max: 98.5 °F (36.9 °C) 98.3 °F (36.8 °C)   BP  Min: 109/64  Max: 164/76 137/71   Pulse  Min: 64  Max: 95  65   Resp  Min: 14  Max: 24 16   SpO2  Min: 93 %  Max: 98 % 95 %       General appearance: Well-developed, well-nourished male in no apparent distress.  HENT: Atraumatic head. Moist mucous membranes of oral cavity.  NG tube in place  Eyes: Normal extraocular movements.   Neck: Supple.   Lungs: C coarse breath sounds anteriorly, no wheezing.  On room air  Heart: Regular rate and rhythm. S1 and S2 present with no murmurs/gallop/rub. No pedal edema. No JVD present.   Abdomen: Soft, non-distended, non-tender. No rebound tenderness/guarding. Bowel sounds are normal.   Extremities: No cyanosis, clubbing, or edema.  Skin: No Rash.   Neuro:  Awake and alert and oriented.  Dysarthric speech.  Able to follow commands, able to raise his right arm right leg and left leg, and somewhat left upper extremity Psych/mental status: Appropriate mood and affect.  He was able to answer some questions but some he did not    LABS AND IMAGING:     Recent Labs   Lab 04/26/23  1036  04/27/23  0748 04/28/23  0707   WBC 7.6 11.5 10.8   RBC 5.53 4.75 4.97   HGB 15.9 13.7* 14.4   HCT 49.4 41.4* 42.9   MCV 89.3 87.2 86.3   MCH 28.8 28.8 29.0   MCHC 32.2* 33.1 33.6   RDW 14.9 14.9 15.1    192 190   MPV 10.7* 11.0* 10.4       Recent Labs   Lab 04/27/23  0748 04/27/23  1539 04/28/23  0259    136 136   K 3.8 3.6 4.3   CO2 23 20* 21*   BUN 12.6 13.2 11.5   CREATININE 0.69* 0.68* 0.70*   CALCIUM 8.3* 7.9* 8.3*   MG  --  1.60 2.30   ALBUMIN 3.5  --  3.7   ALKPHOS 58  --  64   ALT 24  --  26   AST 24  --  29   BILITOT 0.9  --  1.4       Microbiology Results (last 7 days)       ** No results found for the last 168 hours. **             X-Ray Abdomen AP 1 View  Narrative: EXAMINATION:  XR ABDOMEN AP 1 VIEW    CLINICAL HISTORY:  ng tube placement;    TECHNIQUE:  AP X-RAY OF THE ABDOMEN:    CPT 90175    COMPARISON:  April 27, 2023    FINDINGS:  Examination reveals a nasogastric tube with the tip close to the GE junction.    Some residual feces identified throughout the colon gas pattern the these visualized appear to be unremarkable  Impression: Nasogastric tube as above    Electronically signed by: Tomas Hollins  Date:    04/28/2023  Time:    10:12        ASSESSMENT & PLAN:   Status post coil embolization of an aneurysm to the left AMBER on 4/26  History of right MCA with residual left-sided upper and lower extremity weakness and left-sided facial droop  Hypertension  Nicotine dependence with current smoking status      Admitted to ICU on 04/26/2023, downgraded to hospital medicine team on 04/28/2023  Patient still have some episodes of confusion.  Speech evaluated the patient, MBS done, recommended NPO status  NG tube in place, he is getting his medications as well as we recommended to start tube feeds  His aspirin, Plavix, metoprolol has been resumed through NG tube  PT is working with the patient, also consulted OT, Speech also on board, appreciate therapy services   Resumed appropriate home  medication for chronic medical conditions  Morning CBC, CMP stable.  Repeat Sunday    VTE Prophylaxis:  SCDs    Patient condition:  Guarded    Discharge Planning and Disposition/Anticipated discharge: TBD      Smoking cessation counseling:  The patient was counseled in depth on the dangers of tobacco use, and was strictly advised to quit.  Pt was given multiple options to quit . Offered nicotine patch.  Patient neither agreed, not disagreed to quit. Time spent 4 mins      Advanced Directives:  Full code per ICU documentation         All diagnosis and differential diagnosis have been reviewed; at least 55 min was spent on this history and physical exam, assessment and plan has been documented; I have personally reviewed the labs and test results that are presently available; I have reviewed the patients medication list; I have reviewed the consulting providers response and recommendations. I have reviewed or attempted to review medical records based upon their availability.    All of the patient and family questions have been addressed and answered. Patient's is agreeable to the above stated plan. I will continue to monitor closely and make adjustments to medical management as needed.      __________________________________________________________________________  INPATIENT LIST OF MEDICATIONS     Scheduled Meds:   aspirin  81 mg Oral Daily    clopidogreL  75 mg Oral Daily    LIDOcaine (PF) 10 mg/ml (1%)  1 mL Intradermal Once    metoprolol tartrate  25 mg Oral BID    mupirocin   Nasal BID     Continuous Infusions:   sodium chloride 0.9% 75 mL/hr at 04/27/23 0453     PRN Meds:.acetaminophen, enalaprilat, hydrALAZINE, labetalol, ondansetron        Kobe Antoine MD  Department of Hospital Medicine   [facility]   04/28/2023 2:24 PM

## 2023-04-28 NOTE — PLAN OF CARE
04/28/23 1202   Discharge Assessment   Assessment Type Discharge Planning Assessment   Confirmed/corrected address, phone number and insurance Yes   Confirmed Demographics Correct on Facesheet   Source of Information patient   When was your last doctors appointment?   (Unknown)   Communicated HARESH with patient/caregiver Yes   Reason For Admission Cerebral Aneunysm Nonruptured   People in Home alone   Do you expect to return to your current living situation? Yes   Do you have help at home or someone to help you manage your care at home? Yes   Who are your caregiver(s) and their phone number(s)? Sister: Tisha Mccall: [phone]   Prior to hospitilization cognitive status: Unable to Assess   Current cognitive status: Alert/Oriented   Home Accessibility wheelchair accessible   Home Layout Able to live on 1st floor   Equipment Currently Used at Home none   Readmission within 30 days? No   Patient currently being followed by outpatient case management? No   Do you currently have service(s) that help you manage your care at home? No   Do you take prescription medications? Yes   Do you have prescription coverage? Yes   Coverage Medicaid-[employer]   Do you have any problems affording any of your prescribed medications? No   Is the patient taking medications as prescribed? yes   Who is going to help you get home at discharge? One of patient's sister.   How do you get to doctors appointments? car, drives self   Are you on dialysis? No   Do you take coumadin? No   Discharge Plan A Home   Discharge Plan B Home with family   DME Needed Upon Discharge  none   Discharge Plan discussed with: Patient   Discharge Barriers Identified None     Patient's PCP is Dr. Chris Andrews.  Patient's first cousin, Juanjose Green ([phone]) at bedside.  No barriers to discharge at this time.

## 2023-04-28 NOTE — PROGRESS NOTES
[facility] - [facility] ICU  Pulmonary Critical Care Note    Patient Name: Marino Mccrary  MRN: [id number]  Admission Date: 4/26/2023  Hospital Length of Stay: 2 days  Code Status: Prior  Attending Provider: Franki Shea MD  Primary Care Provider: TERESSA Andrews MD     Subjective:     HPI:   63-year-old male with a past medical history of right-sided MCA stroke with residual left-sided weakness and facial droop, CAD, hypertension, and mixed hyperlipidemia presents to the ICU for close monitoring and evaluation status post coiling embolization of a cerebral aneurysm to the left AMBER earlier today.  When asked, patient is able to move his right upper extremity and right lower extremity with full range of motion.  Patient is not able to fully extend the left upper extremity and has trouble lifting the arm above his had.  The patient has some trouble with lifting the right lower extremity.  History is otherwise limited secondary to incomprehensible speech likely due to anesthetics, however, can not rule out brain pathology.    Hospital Course/Significant events:  04/26/2023:  Coil embolization of left AMBER aneurysm and admission to the ICU    24 Hour Interval History:  Patient showed marked improvement neurologically over the past 24 hours.  CT head done yesterday was unremarkable.    Past Medical History:   Diagnosis Date    Acid reflux     Cerebral aneurysm     Heart disease     Hypertension     Mixed hyperlipidemia        Past Surgical History:   Procedure Laterality Date    BACK SURGERY      CARDIAC SURGERY      INSERT / REPLACE / REMOVE PACEMAKER Left     KNEE SURGERY         Social History     Socioeconomic History    Marital status:    Tobacco Use    Smoking status: Every Day     Packs/day: 1.00     Types: Cigarettes    Smokeless tobacco: Never   Substance and Sexual Activity    Alcohol use: Yes     Comment: 1/2 pint of whiskey every month    Drug use: Never           Current Outpatient  Medications   Medication Instructions    amitriptyline (ELAVIL) 25 mg, Oral, Nightly    aspirin (ECOTRIN) 81 mg, Oral, Daily    atorvastatin (LIPITOR) 10 mg, Oral, Daily    clindamycin (CLEOCIN) 150 MG capsule SMARTSIG:3 Capsule(s) By Mouth Every 8 Hours    clopidogreL (PLAVIX) 75 mg, Oral, Daily    fluticasone propionate (FLONASE) 50 mcg/actuation nasal spray 1 spray, Each Nostril    metoprolol succinate (TOPROL-XL) 100 mg, Oral    pantoprazole (PROTONIX) 40 mg, Oral, Daily    QUEtiapine (SEROQUEL) 100 mg, Oral, Nightly    sacubitriL-valsartan (ENTRESTO) 24-26 mg per tablet 1 tablet, Oral, 2 times daily    scopolamine (TRANSDERM-SCOP) 1.3-1.5 mg (1 mg over 3 days) 1 patch, Transdermal, Every 72 hours    spironolactone (ALDACTONE) 25 mg, Oral, Daily    zolpidem (AMBIEN) 5 mg, Oral, Nightly PRN       Current Inpatient Medications   aspirin  81 mg Oral Daily    clopidogreL  75 mg Oral Daily    LIDOcaine (PF) 10 mg/ml (1%)  1 mL Intradermal Once    metoprolol tartrate  25 mg Oral BID    mupirocin   Nasal BID    potassium phosphate IVPB  30 mmol Intravenous Once       Current Intravenous Infusions   sodium chloride 0.9% 75 mL/hr at 04/27/23 0453               Objective:       Intake/Output Summary (Last 24 hours) at 4/28/2023 0950  Last data filed at 4/28/2023 0642  Gross per 24 hour   Intake 1033 ml   Output 1900 ml   Net -867 ml           Vital Signs (Most Recent):  Temp: 97.9 °F (36.6 °C) (04/28/23 0000)  Pulse: 75 (04/28/23 0800)  Resp: (!) 23 (04/28/23 0800)  BP: 126/78 (04/28/23 0800)  SpO2: 97 % (04/28/23 0800)  Body mass index is 25.07 kg/m².  Weight: 74.8 kg (164 lb 14.5 oz) Vital Signs (24h Range):  Temp:  [97.9 °F (36.6 °C)-98.5 °F (36.9 °C)] 97.9 °F (36.6 °C)  Pulse:  [] 75  Resp:  [14-26] 23  SpO2:  [68 %-98 %] 97 %  BP: (111-167)/(58-84) 126/78  Arterial Line BP: ()/(31-72) 145/59     Physical Exam  Constitutional:       General: He is not in acute distress.     Appearance: He is not  ill-appearing, toxic-appearing or diaphoretic.   HENT:      Head: Normocephalic and atraumatic.   Eyes:      Extraocular Movements: Extraocular movements intact.      Conjunctiva/sclera: Conjunctivae normal.      Pupils: Pupils are equal, round, and reactive to light.   Cardiovascular:      Rate and Rhythm: Normal rate and regular rhythm.      Pulses: Normal pulses.      Heart sounds: Normal heart sounds. No murmur heard.    No friction rub. No gallop.   Pulmonary:      Effort: Pulmonary effort is normal. No respiratory distress.      Breath sounds: Normal breath sounds. No stridor. No wheezing, rhonchi or rales.   Chest:      Chest wall: No tenderness.   Abdominal:      General: Bowel sounds are normal. There is no distension.      Palpations: Abdomen is soft. There is no mass.      Tenderness: There is no abdominal tenderness. There is no guarding or rebound.      Hernia: No hernia is present.   Musculoskeletal:         General: No swelling or tenderness. Normal range of motion.      Comments: Right brace to the right lower extremity with a catheter was placed to the right groin   Skin:     General: Skin is warm.      Capillary Refill: Capillary refill takes less than 2 seconds.      Coloration: Skin is not jaundiced.      Findings: No erythema.   Neurological:      Comments: Much more awake today.  Follows all commands.  Moves all extremities.  Attempting to speak.  Laughs at jokes.         Lines/Drains/Airways       Drain  Duration             Male External Urinary Catheter 04/27/23 0246 1 day         NG/OG Tube 04/27/23 1115 nasogastric 16 Fr. Right nostril <1 day              Peripheral Intravenous Line  Duration                  Peripheral IV - Single Lumen 04/26/23 1015 20 G Left Antecubital 1 day                    Significant Labs:    Lab Results   Component Value Date    WBC 10.8 04/28/2023    HGB 14.4 04/28/2023    HCT 42.9 04/28/2023    MCV 86.3 04/28/2023     04/28/2023         BMP  Lab Results    Component Value Date     04/28/2023    K 4.3 04/28/2023    CHLORIDE 106 04/28/2023    CO2 21 (L) 04/28/2023    BUN 11.5 04/28/2023    CREATININE 0.70 (L) 04/28/2023    CALCIUM 8.3 (L) 04/28/2023    AGAP 10.0 04/27/2023       ABG  No results for input(s): PH, PO2, PCO2, HCO3, BE in the last 168 hours.    Mechanical Ventilation Support:       Significant Imaging:          Assessment/Plan:     Assessment  Status post coil embolization of an aneurysm to the left AMBER  History of CVA to the right MCA with residual left-sided upper and lower extremity weakness and left-sided facial droop  Hypertension      Plan  Neuro status has markedly improved.  CT scan done yesterday was unremarkable.  I suspect his altered mental status was just a delayed recovery from anesthesia.  He is stable for downgrade to the floor.  He did have some bigeminy intermittently yesterday and has not been on his metoprolol so I restarted that but at a decreased dose for now      DVT Prophylaxis:  SCD  GI Prophylaxis:  None       Bon Pandey MD  Pulmonary Critical Care Medicine  [facility] - [facility] ICU

## 2023-04-29 LAB
ALBUMIN SERPL-MCNC: 3.9 G/DL (ref 3.4–4.8)
ALBUMIN/GLOB SERPL: 1.1 RATIO (ref 1.1–2)
ALP SERPL-CCNC: 67 UNIT/L (ref 40–150)
ALT SERPL-CCNC: 30 UNIT/L (ref 0–55)
AST SERPL-CCNC: 32 UNIT/L (ref 5–34)
BASOPHILS # BLD AUTO: 0.03 X10(3)/MCL (ref 0–0.2)
BASOPHILS NFR BLD AUTO: 0.3 %
BILIRUBIN DIRECT+TOT PNL SERPL-MCNC: 1.6 MG/DL
BUN SERPL-MCNC: 15.1 MG/DL (ref 8.4–25.7)
CALCIUM SERPL-MCNC: 9.3 MG/DL (ref 8.8–10)
CHLORIDE SERPL-SCNC: 104 MMOL/L (ref 98–107)
CO2 SERPL-SCNC: 24 MMOL/L (ref 23–31)
CREAT SERPL-MCNC: 0.81 MG/DL (ref 0.73–1.18)
EOSINOPHIL # BLD AUTO: 0.05 X10(3)/MCL (ref 0–0.9)
EOSINOPHIL NFR BLD AUTO: 0.5 %
ERYTHROCYTE [DISTWIDTH] IN BLOOD BY AUTOMATED COUNT: 15 % (ref 11.5–17)
GFR SERPLBLD CREATININE-BSD FMLA CKD-EPI: >60 MLS/MIN/1.73/M2
GLOBULIN SER-MCNC: 3.4 GM/DL (ref 2.4–3.5)
GLUCOSE SERPL-MCNC: 93 MG/DL (ref 82–115)
HCT VFR BLD AUTO: 47.9 % (ref 42–52)
HGB BLD-MCNC: 15.9 G/DL (ref 14–18)
IMM GRANULOCYTES # BLD AUTO: 0.03 X10(3)/MCL (ref 0–0.04)
IMM GRANULOCYTES NFR BLD AUTO: 0.3 %
LYMPHOCYTES # BLD AUTO: 2.58 X10(3)/MCL (ref 0.6–4.6)
LYMPHOCYTES NFR BLD AUTO: 25.6 %
MAGNESIUM SERPL-MCNC: 2 MG/DL (ref 1.6–2.6)
MCH RBC QN AUTO: 29.4 PG (ref 27–31)
MCHC RBC AUTO-ENTMCNC: 33.2 G/DL (ref 33–36)
MCV RBC AUTO: 88.5 FL (ref 80–94)
MONOCYTES # BLD AUTO: 0.91 X10(3)/MCL (ref 0.1–1.3)
MONOCYTES NFR BLD AUTO: 9 %
NEUTROPHILS # BLD AUTO: 6.49 X10(3)/MCL (ref 2.1–9.2)
NEUTROPHILS NFR BLD AUTO: 64.3 %
NRBC BLD AUTO-RTO: 0 %
PHOSPHATE SERPL-MCNC: 3 MG/DL (ref 2.3–4.7)
PLATELET # BLD AUTO: 209 X10(3)/MCL (ref 130–400)
PMV BLD AUTO: 10.9 FL (ref 7.4–10.4)
POTASSIUM SERPL-SCNC: 4.3 MMOL/L (ref 3.5–5.1)
PROT SERPL-MCNC: 7.3 GM/DL (ref 5.8–7.6)
RBC # BLD AUTO: 5.41 X10(6)/MCL (ref 4.7–6.1)
SODIUM SERPL-SCNC: 138 MMOL/L (ref 136–145)
WBC # SPEC AUTO: 10.1 X10(3)/MCL (ref 4.5–11.5)

## 2023-04-29 PROCEDURE — 25000003 PHARM REV CODE 250: Performed by: STUDENT IN AN ORGANIZED HEALTH CARE EDUCATION/TRAINING PROGRAM

## 2023-04-29 PROCEDURE — S4991 NICOTINE PATCH NONLEGEND: HCPCS | Performed by: STUDENT IN AN ORGANIZED HEALTH CARE EDUCATION/TRAINING PROGRAM

## 2023-04-29 PROCEDURE — 94761 N-INVAS EAR/PLS OXIMETRY MLT: CPT

## 2023-04-29 PROCEDURE — 85025 COMPLETE CBC W/AUTO DIFF WBC: CPT | Performed by: INTERNAL MEDICINE

## 2023-04-29 PROCEDURE — 97166 OT EVAL MOD COMPLEX 45 MIN: CPT

## 2023-04-29 PROCEDURE — 11000001 HC ACUTE MED/SURG PRIVATE ROOM

## 2023-04-29 PROCEDURE — 25000003 PHARM REV CODE 250: Performed by: INTERNAL MEDICINE

## 2023-04-29 PROCEDURE — 25000003 PHARM REV CODE 250: Performed by: NURSE PRACTITIONER

## 2023-04-29 PROCEDURE — 84100 ASSAY OF PHOSPHORUS: CPT | Performed by: INTERNAL MEDICINE

## 2023-04-29 PROCEDURE — 80053 COMPREHEN METABOLIC PANEL: CPT | Performed by: INTERNAL MEDICINE

## 2023-04-29 PROCEDURE — 83735 ASSAY OF MAGNESIUM: CPT | Performed by: INTERNAL MEDICINE

## 2023-04-29 PROCEDURE — 97116 GAIT TRAINING THERAPY: CPT | Mod: CQ

## 2023-04-29 PROCEDURE — 92526 ORAL FUNCTION THERAPY: CPT

## 2023-04-29 RX ORDER — GLYCOPYRROLATE 1 MG/1
1 TABLET ORAL 3 TIMES DAILY PRN
Status: DISCONTINUED | OUTPATIENT
Start: 2023-04-29 | End: 2023-05-15 | Stop reason: HOSPADM

## 2023-04-29 RX ORDER — SCOLOPAMINE TRANSDERMAL SYSTEM 1 MG/1
1 PATCH, EXTENDED RELEASE TRANSDERMAL
Status: DISCONTINUED | OUTPATIENT
Start: 2023-04-29 | End: 2023-05-15 | Stop reason: HOSPADM

## 2023-04-29 RX ORDER — IBUPROFEN 200 MG
1 TABLET ORAL DAILY
Status: DISCONTINUED | OUTPATIENT
Start: 2023-04-29 | End: 2023-05-15 | Stop reason: HOSPADM

## 2023-04-29 RX ADMIN — MUPIROCIN: 20 OINTMENT TOPICAL at 09:04

## 2023-04-29 RX ADMIN — METOPROLOL TARTRATE 25 MG: 25 TABLET, FILM COATED ORAL at 08:04

## 2023-04-29 RX ADMIN — ASPIRIN 81 MG: 81 TABLET, COATED ORAL at 08:04

## 2023-04-29 RX ADMIN — NICOTINE 1 PATCH: 21 PATCH, EXTENDED RELEASE TRANSDERMAL at 05:04

## 2023-04-29 RX ADMIN — SCOPOLAMINE 1 PATCH: 1 PATCH TRANSDERMAL at 05:04

## 2023-04-29 RX ADMIN — ATORVASTATIN CALCIUM 10 MG: 10 TABLET, FILM COATED ORAL at 08:04

## 2023-04-29 RX ADMIN — METOPROLOL TARTRATE 25 MG: 25 TABLET, FILM COATED ORAL at 09:04

## 2023-04-29 RX ADMIN — SACUBITRIL AND VALSARTAN 1 TABLET: 24; 26 TABLET, FILM COATED ORAL at 08:04

## 2023-04-29 RX ADMIN — SACUBITRIL AND VALSARTAN 1 TABLET: 24; 26 TABLET, FILM COATED ORAL at 09:04

## 2023-04-29 RX ADMIN — MUPIROCIN: 20 OINTMENT TOPICAL at 08:04

## 2023-04-29 RX ADMIN — CLOPIDOGREL BISULFATE 75 MG: 75 TABLET ORAL at 08:04

## 2023-04-29 NOTE — PT/OT/SLP EVAL
Occupational Therapy  Evaluation    Name: Marino Mccrary  MRN: [id number]  Admitting Diagnosis: L anterior cerebral artery aneurysm, s/p coil embolization (4/26)  Recent Surgery: * No procedures listed * 3 Days Post-Op    Recommendations:     Discharge Recommendations: other (see comments) (SNF vs. home with family assist and home health therapy)  Discharge Equipment Recommendations:  cane, quad, other (see comments) (pending progress)  Barriers to discharge:  Other (Comment) (Mild functional deficits)    Assessment:     Marino Mccrary is a 63 y.o. male with a medical diagnosis of  L anterior cerebral artery aneurysm, s/p coil embolization (4/26).  He has a significant PMHx of R MCA stroke with L sided deficits. He presents with the following performance deficits affecting function: weakness, impaired self care skills, impaired functional mobility, impaired endurance, gait instability, impaired balance, decreased upper extremity function, impaired cognition. He generally requires Min A for most ADLs and functional mobility assessed today.  Pending his progress with therapy and clarification of level of assist family will be able to provide, pt may be appropriate to discharge home with family assist and home health. If family is unable to assist regularly, SNF placement may be most appropriate in order to further address his deficits and maximize his functional independence prior to returning home.     Rehab Prognosis: Good; patient would benefit from acute skilled OT services to address these deficits and reach maximum level of function.       Plan:     Patient to be seen 5 x/week to address the above listed problems via self-care/home management, therapeutic activities, therapeutic exercises  Plan of Care Expires: 05/10/23  Plan of Care Reviewed with: patient    Subjective     Chief Complaint: None stated  Patient/Family Comments/goals: None stated    Occupational Profile:  Living Environment: Pt reports living  alone in a [facility].  Previous level of function: Independent  Roles and Routines: Basic self-care tasks  Equipment Used at Home: none  Assistance upon Discharge: Likely family assist, however unsure of how frequent this assist will be provided.    Pain/Comfort:  Pain Rating 1: 0/10    Patients cultural, spiritual, Holiness conflicts given the current situation: no    Objective:     Communicated with: RN prior to session.  Patient found HOB elevated with blood pressure cuff, NG tube, pulse ox (continuous), telemetry, peripheral IV upon OT entry to room. With RN permission, NG tube feeding was paused for today's session and restarted prior to OT leaving the bedside.    General Precautions: Standard, fall  Orthopedic Precautions: N/A  Braces: N/A  Respiratory Status: Room air  Vital Signs: Blood Pressure: 133/94  HR: 75  Sp02: 99%    Occupational Performance:    Bed Mobility:    Patient completed Rolling/Turning to Left with  modified independence  Patient completed Rolling/Turning to Right with modified independence  Patient completed Scooting/Bridging with contact guard assistance  Patient completed Supine to Sit with contact guard assistance  Patient completed Sit to Supine with contact guard assistance    Functional Mobility/Transfers:  Patient completed Sit <> Stand Transfer with minimum assistance  with  no assistive device   Patient completed Toilet Transfer Step Transfer technique with minimum assistance with  no AD  Functional Mobility: Pt completed functional mobility bedside>window>toilet>bedside with generally Min A and no AD. He was occasionally unsteady, and was noted to brace himself with the wall or pieces of furniture in the room.     Activities of Daily Living:  Feeding:  Pt is currently requiring NG feedings at this time    Lower Body Dressing: contact guard assistance to adjust BL socks while seated EOB.    Cognitive/Visual Perceptual:  Cognitive/Psychosocial Skills:     -       Oriented to: Person,  Place, and Time   -       Follows Commands/attention:Follows multistep  commands  -       Safety awareness/insight to disability: impaired   Visual/Perceptual:      -Intact tracking and visual field assessment. Occasional difficulty maintaining fixation on stimulus.    Physical Exam:  Balance:    -       Static sitting balance WFL. Static standing balance mildly impaired, requiring at least CGA without AD.  Postural examination/scapula alignment:    -       Affected scapula elevated  Sensation:    -       Intact  Upper Extremity Range of Motion:     -       Right Upper Extremity: WFL  -       Left Upper Extremity: WFL except shoulder flexion moderately limited (present at baseline)  Upper Extremity Strength:    -       Right Upper Extremity: Grossly 4/5 MMT  -       Left Upper Extremity: 3/5 MMT of hand and elbow joint planes; 2+/5 MMT of shoulder joint planes.   Fine Motor Coordination:    -       Intact- requires increased time for manipulation of objects and finger-to-nose assessment, but overall WFL.    Therapeutic Positioning  Risk for acquired pressure injuries is decreased due to ability to get to BSC/toilet with assist and ability to complete rolling in bed with mod I .    OT interventions performed during the course of today's session in an effort to prevent and/or reduce acquired pressure injuries:   Education on Pressure Ulcer Prevention provided  PUP Kit in room    Skin assessment:  Visualized sacrum, and BL elbows.   Findings: no redness or breakdown noted    OT recommendations for therapeutic positioning throughout hospitalization:   Follow [facility] Pressure Injury Prevention Protocol as appropriate    Patient Education:  Patient provided with verbal education regarding OT role/goals/POC, fall prevention, safety awareness, and pressure ulcer prevention.  Understanding was verbalized, however additional teaching warranted.     Patient left HOB elevated with all lines intact, call button in reach, RN  notified, and NG tube feeding running.    GOALS:   Multidisciplinary Problems       Occupational Therapy Goals          Problem: Occupational Therapy    Goal Priority Disciplines Outcome Interventions   Occupational Therapy Goal     OT, PT/OT Ongoing, Progressing    Description: Goals to be met by: Discharge     Patient will increase functional independence with ADLs by performing:    LE Dressing with Modified [place].  Grooming while standing at sink with Modified [place].  Toileting from toilet with Modified [place] for hygiene and clothing management.   Bathing from  shower chair/bench with Modified [place].  Toilet transfer to toilet with Modified [place].                         History:     Past Medical History:   Diagnosis Date    Acid reflux     Cerebral aneurysm     Heart disease     Hypertension     Mixed hyperlipidemia          Past Surgical History:   Procedure Laterality Date    BACK SURGERY      CARDIAC SURGERY      INSERT / REPLACE / REMOVE PACEMAKER Left     KNEE SURGERY         Time Tracking:     OT Date of Treatment: 04/29/23  OT Start Time: 1137  OT Stop Time: 1213  OT Total Time (min): 36 min    Billable Minutes:Evaluation (Moderate Complexity)    4/29/2023

## 2023-04-29 NOTE — PT/OT/SLP PROGRESS
Speech Language Pathology Department  Dysphagia Therapy Progress Note    Patient Name:  Marino Mccrary   MRN:  [id number]  Admitting Diagnosis: unruptured cerebral aneurysm     Recommendations:     General recommendations:  dysphagia therapy  Diet recommendations:  NPO, Liquid Diet Level: NPO   Aspiration precautions:  NPO    Discharge recommendations:  nursing facility, skilled   Barriers to safe discharge:  acuity of illness and severity of impairment    Subjective     Patient awake and alert.    Pain/Comfort: Pain Rating 1: 0/10    Spiritual/Cultural/Tenriism Beliefs/Practices that affect care: no    Objective:     Oral Musculature Evaluation:  Oral Musculature: general weakness  Secretion Management: coughing on secretions  Volitional Cough: productive  Voice Prior to PO Intake: hoarse    Therapeutic Activities:  Pt completed base of tongue and laryngeal x80 with moderate-maximum cues.    Assessment:     Pt continues to present with oropharyngeal dysphagia and remains unsafe for PO intake.    Goals:     Multidisciplinary Problems       SLP Goals          Problem: SLP    Goal Priority Disciplines Outcome   SLP Goal     SLP Ongoing, Progressing   Description:   LTG: Tolerate least restrictive PO diet with no clinical signs/sx aspiration  STGs:  1.  Complete base of tongue and laryngeal strengthening exercises with minimal cues  2.  Tolerate thermal stimulation to the anterior faucial pillars with 100% effortful swallow responses and delay less than 2 seconds.                       Patient Education:     Patient provided with verbal education regarding POC.  Understanding was verbalized, however additional teaching warranted.    Plan:     Will continue to follow and tx as appropriate.    SLP Follow-Up:  Yes   Patient to be seen:  daily   Plan of Care expires:  05/05/23  Plan of Care reviewed with:  patient       Time Tracking:     SLP Treatment Date:   04/29/23  Speech Start Time:  0815  Speech Stop Time:  0830      Speech Total Time (min):  15 min    Billable minutes:  Treatment of Swallow Dysfunction, 15 minutes        04/29/2023

## 2023-04-29 NOTE — NURSING
Nurses Note -- 4 Eyes      4/29/2023   4:42 PM      Skin assessed during: Q Shift Change      [x] No Altered Skin Integrity Present    []Prevention Measures Documented      [] Yes- Altered Skin Integrity Present or Discovered   [] LDA Added if Not in Epic (Describe Wound)   [] New Altered Skin Integrity was Present on Admit and Documented in LDA   [] Wound Image Taken    Wound Care Consulted? No    Attending Nurse:  Shy Arenas RN     Second RN/Staff Member:  SUSAN Rose

## 2023-04-29 NOTE — PT/OT/SLP PROGRESS
Physical Therapy Treatment    Patient Name:  Marino Mccrary   MRN:  [id number]    Recommendations:     Discharge Recommendations:   (placement vs home penidng progress; might benefit from placement since he lives alone)   Discharge Equipment Recommendations: none     Subjective     Patient awake and alert.     Objective:     General Precautions: Standard, fall   Orthopedic Precautions:N/A   Braces:    Respiratory Status: Room air  Communicated with nurse prior to treatment.     Functional Mobility:    Rolling:Minimal Assistance  Supine to sit:Minimal Assistance  Sit to stand transfer: Minimal Assistance  Bed to chair transfer:Minimal Assistance    /80. Gait 200 ft without AD min assist due to unsteady gait.      Skin Integrity: Visible skin intact    PT interventions performed during the course of today's session in an effort to prevent and/or reduce acquired pressure injuries:   Therapeutic positioning completed       GOALS:   Multidisciplinary Problems       Physical Therapy Goals          Problem: Physical Therapy    Goal Priority Disciplines Outcome Goal Variances Interventions   Physical Therapy Goal     PT, PT/OT Ongoing, Progressing     Description: Goals to be met by: 23     Patient will increase functional independence with mobility by performin. Supine to sit with Stand-by Assistance  2. Sit to supine with Stand-by Assistance  3. Sit to stand transfer with Stand-by Assistance  4. Gait  x 150 feet with Stand-by Assistance using No Assistive Device.                          Assessment:     Marino Mccrary is a 63 y.o. male admitted with a medical diagnosis of <principal problem not specified>.     Patient Active Problem List   Diagnosis    Acute ischemic right MCA stroke    Unruptured cerebral aneurysm    Education Provided:  Role and goals of PT, transfer training, bed mobility, gait training, balance training, safety awareness, assistive device, strengthening exercises, deep breathing  techniques, and importance of participating in PT to return to PLOF.    Expected compliance:  Moderate      Rehab Prognosis: Good; patient would benefit from acute skilled PT services to address these deficits and reach maximum level of function.    Recent Surgery: * No procedures listed * 3 Days Post-Op    Plan:     During this hospitalization, patient to be seen 6 x/week to address the identified rehab impairments via gait training, therapeutic activities, therapeutic exercises and progress toward the following goals:    Plan of Care Expires:  05/28/23    Billable Minutes     Billable Minutes: Gait Training 15    Treatment Type: Treatment  PT/PTA: PTA     Number of PTA visits since last PT visit: 1 04/29/2023

## 2023-04-29 NOTE — PROGRESS NOTES
[facility]        Chief Complaint: Inpatient Follow-up    HPI:   Mr Mccrary is a 63 y.o. 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 King.  Patient was admitted to ICU postprocedure for monitoring.  He had a delayed recovery from anesthesia.  Patient has a history of right MCA stroke with left-sided residual weakness.  At some point while he was still under the effect of anesthesia, he was unable to move his right lower extremity which has since resolved.  Patient still have weakness in the left upper extremity, which per ICU nurse, his sister confirmed that it is residual from his previous stroke.  Sister reported that patient lives alone but he is pretty functional given he cooks for himself, does his grocery shopping, drives and mows his yard as well.    During ICU stay, overnight, had some confusion, CT head repeated was negative.  Patient did receive IV Ativan.    Patient speech is also dysarthric from his previous stroke.  Sister confirmed that every time patient eat or drink something he coughs.  That made as concern for swallow related issues and concern for aspiration as well.  Speech was consulted  Physical therapy and occupational therapy also consulted    Interval Hx:   Today, Mr Mccrary was seen at bedside.  He stated he was doing well and had no new complaints.  Noted to be somewhat confused but able to answer most questions.  Nurse stating that this is patient's baseline.  Will follow up PT/OT and ST recommendations regarding discharge planning.  Patient is still has NG tube and is receiving TF.    Objective/physical exam:  General: In no acute  distress, afebrile  Chest: Clear to auscultation bilaterally  Heart: RRR, +S1, S2, no appreciable murmur  Abdomen: NGT in place; Soft, nontender, BS +  MSK: Warm, no lower extremity edema, no clubbing or cyanosis  Neurologic: Alert and oriented x4, Cranial nerve II-XII intact, Strength 4/5 in LUE/LLE & 5/5 in RUE/RLE    VITAL SIGNS: 24 HRS MIN & MAX LAST   Temp  Min: 98.1 °F (36.7 °C)  Max: 98.6 °F (37 °C) 98.1 °F (36.7 °C)   BP  Min: 130/79  Max: 142/89 136/80   Pulse  Min: 55  Max: 76  76   Resp  Min: 14  Max: 28 14   SpO2  Min: 90 %  Max: 98 % 97 %       Recent Labs   Lab 04/27/23  0748 04/28/23  0707 04/29/23  0757   WBC 11.5 10.8 10.1   RBC 4.75 4.97 5.41   HGB 13.7* 14.4 15.9   HCT 41.4* 42.9 47.9   MCV 87.2 86.3 88.5   MCH 28.8 29.0 29.4   MCHC 33.1 33.6 33.2   RDW 14.9 15.1 15.0    190 209   MPV 11.0* 10.4 10.9*       Recent Labs   Lab 04/27/23  0748 04/27/23  1539 04/28/23  0259 04/29/23  0757    136 136 138   K 3.8 3.6 4.3 4.3   CO2 23 20* 21* 24   BUN 12.6 13.2 11.5 15.1   CREATININE 0.69* 0.68* 0.70* 0.81   CALCIUM 8.3* 7.9* 8.3* 9.3   MG  --  1.60 2.30 2.00   ALBUMIN 3.5  --  3.7 3.9   ALKPHOS 58  --  64 67   ALT 24  --  26 30   AST 24  --  29 32   BILITOT 0.9  --  1.4 1.6*     Assessment/Plan:  s/p Coil Embolization of L AMBER Aneurysm  Prior R MCA CVA with Residual LUE/LLE Weakness, L Facial Droop  HTN  Tobacco Smoking    Patient continues to be admitted for close monitoring   Noted to be at baseline in terms of mental status; noted to be somewhat confused but able to answer questions  Reporting no complaints presently   PT/OT on board; following recommendations   ST on board; performed MBS which patient failed; patient currently NPO; will follow recommendations  NGT in place; patient receiving TF   Patient continued on ASA, atorvastatin, Plavix, metoprolol tartrate, Entresto  Patient continued on IV NS 75 mL/hour  Continue monitoring patient's symptoms    VTE prophylaxis: SCDs    Patient  condition:  Stable    Anticipated discharge and Disposition:     Pending    All diagnosis and differential diagnosis have been reviewed; assessment and plan has been documented; I have personally reviewed the labs and test results that are presently available; I have reviewed the patients medication list; I have reviewed the consulting providers response and recommendations. I have reviewed or attempted to review medical records based upon their availability    All of the patient's questions have been  addressed and answered. Patient's is agreeable to the above stated plan. I will continue to monitor closely and make adjustments to medical management as needed.  _____________________________________________________________________    Nutrition Status: TF    Radiology:  X-Ray Chest 1 View  EXAMINATION  XR CHEST 1 VIEW    CLINICAL HISTORY  Shortness of breath;    TECHNIQUE  A total of 1 frontal image(s) submitted of the chest.    COMPARISON  7 January 2023    FINDINGS  Lines/tubes/devices: Esophagogastric tube is now visualized, extending the length of the esophagus and terminating beyond the lower image margin.  Left chest wall single lead ICD is unchanged.  ECG leads overlie the chest.    The cardiac silhouette and central vascular structures are unchanged.  The trachea is midline. No new or worsening consolidation is developed in the interval.  There is no large pleural effusion or convincing pneumothorax.    Regional osseous structures and extrathoracic soft tissues are similar.    IMPRESSION  1. No acute process or other adverse interval change.  2. Esophagogastric tube terminates inferior to the level of diaphragm.    Electronically signed by: Brandon Constantino  Date:    04/29/2023  Time:    09:49      Irvin Posada MD   04/29/2023

## 2023-04-29 NOTE — PLAN OF CARE
Problem: Occupational Therapy  Goal: Occupational Therapy Goal  Description: Goals to be met by: Discharge     Patient will increase functional independence with ADLs by performing:    LE Dressing with Modified [place].  Grooming while standing at sink with Modified [place].  Toileting from toilet with Modified [place] for hygiene and clothing management.   Bathing from  shower chair/bench with Modified [place].  Toilet transfer to toilet with Modified [place].    Outcome: Ongoing, Progressing

## 2023-04-29 NOTE — CONSULTS
Inpatient Nutrition Assessment    Admit Date: 4/26/2023   Total duration of encounter: 3 days     Nutrition Recommendation/Prescription     TF recommendations: Peptamen AF @ 75 mL/hr   Kcal: 1800 kcal/day (~99% est kcal needs)  Protein: 114 g/day (~126% est protein needs)  Fluid: 1218 mL (~67% est fluid needs)  Monitor tolerance, weight,  and labs    Communication of Recommendations: reviewed with nurse    Nutrition Assessment     Malnutrition Assessment/Nutrition-Focused Physical Exam    Malnutrition in the context of acute illness or injury  Degree of Malnutrition: unable to complete  Energy Intake: unable to obtain  Interpretation of Weight Loss: >7.5% in 3 months (per EMR)  Body Fat:does not meet criteria  Area of Body Fat Loss: does not meet criteria  Muscle Mass Loss: does not meet criteria  Area of Muscle Mass Loss: does not meet criteria  Fluid Accumulation: unable to obtain  Edema: unable to obtain   Reduced  Strength: unable to obtain  A minimum of two characteristics is recommended for diagnosis of either severe or non-severe malnutrition.    Chart Review    Reason Seen: physician consult for TF recommendations    Malnutrition Screening Tool Results                Diagnosis:  Status post coil embolization of an aneurysm to the left AMBER  History of CVA to the right MCA with residual left-sided upper and lower extremity weakness and left-sided facial droop  Hypertension    Relevant Medical History:    Acid reflux      Cerebral aneurysm      Heart disease      Hypertension      Mixed hyperlipidemia        Nutrition-Related Medications: Aspirin, Atorvastatin, NS @ 75 mL/hr  Calorie Containing IV Medications: no significant kcals from medications at this time    Nutrition-Related Labs:  4/29/2023: Total Bili: 1.6 and Glucose 93    Diet/PN Order: Diet NPO  Oral Supplement Order: none  Tube Feeding Order:  Peptamen @ 75 mL/hr (see below for calculation) **Peptamen @ 10 mL/hr running currently, TF just  "started**  Appetite/Oral Intake: NPO/NPO  Factors Affecting Nutritional Intake: NPO  Food/[employer]/Cultural Preferences: unable to obtain  Food Allergies: unable to obtain       Wound(s):   none noted.    Comments    2023: Pt unable to answer questions. Per EMR, pt weighed 83.9 kg on 2023 (11% wt loss in 3 months, significant). Provided TF recommendations of Peptamen @ 75 mL/hr. Pt currently has Peptamen @ 10 mL/hr running, no issues noted thus far per nurse. Will monitor.    Anthropometrics    Height: 5' 8" (172.7 cm) Height Method: Stated  Last Weight: 74.8 kg (164 lb 14.5 oz) (23 1026) Weight Method: Standard Scale  BMI (Calculated): 25.1  BMI Classification: overweight (BMI 25-29.9)        Ideal Body Weight (IBW), Male: 154 lb     % Ideal Body Weight, Male (lb): 107.08 %                 Usual Body Weight (UBW), k.9 kg  % Usual Body Weight: 89.34     Usual Weight Provided By: EMR weight history    Wt Readings from Last 5 Encounters:   23 74.8 kg (164 lb 14.5 oz)   23 77 kg (169 lb 12.1 oz)   23 83.9 kg (185 lb)   23 83.9 kg (185 lb)     Weight Change(s) Since Admission:  Admit Weight: 74.8 kg (164 lb 14.5 oz) (23 1026)  2023: 74.8 kg    Estimated Needs    Weight Used For Calorie Calculations: 74.8 kg (164 lb 14.5 oz)  Energy Calorie Requirements (kcal): 4680-1190 (MSJ x 1.2-1.3)  Energy Need Method: [facility]  Weight Used For Protein Calculations: 74.8 kg (164 lb 14.5 oz)  Protein Requirements:  (1.2-2.0 g/kg)  Fluid Requirements (mL): 1816 (1 mL/kcal)  Temp (24hrs), Av.3 °F (36.8 °C), Min:98.1 °F (36.7 °C), Max:98.6 °F (37 °C)         Enteral Nutrition    Formula: Peptamen AF  Rate/Volume: 10 mL/hr (200 mL) **TF just started**  Water Flushes: N/A  Additives/Modulars: none at this time  Route: nasogastric tube  Method: continuous  Total Nutrition Provided by Tube Feeding, Additives, and Flushes:  Calories Provided  240 kcal/d, 13% needs "   Protein Provided  15 g/d, 16% needs   Fluid Provided  78 ml/d, 4% needs   Continuous feeding calculations based on estimated 20 hr/d run time unless otherwise stated.    Parenteral Nutrition    Patient not receiving parenteral nutrition support at this time.    Evaluation of Received Nutrient Intake    Calories: not meeting estimated needs  Protein: not meeting estimated needs    Patient Education    Not applicable.    Nutrition Diagnosis     PES: Increased nutrient needs related to acute illness as evidenced by increased nutrient needs due to hospitalization (ICU). (new)    Interventions/Goals     Intervention(s): modified composition of enteral nutrition  Goal: Tolerate enteral feeding at goal rate by follow-up. (new)    Monitoring & Evaluation     Dietitian will monitor enteral nutrition intake, weight, electrolyte/renal panel, glucose/endocrine profile, and gastrointestinal profile.  Nutrition Risk/Follow-Up: moderate (follow-up in 3-5 days)   Please consult if re-assessment needed sooner.

## 2023-04-30 LAB
ALBUMIN SERPL-MCNC: 3.6 G/DL (ref 3.4–4.8)
ALBUMIN/GLOB SERPL: 1.1 RATIO (ref 1.1–2)
ALP SERPL-CCNC: 65 UNIT/L (ref 40–150)
ALT SERPL-CCNC: 26 UNIT/L (ref 0–55)
AST SERPL-CCNC: 26 UNIT/L (ref 5–34)
BASOPHILS # BLD AUTO: 0.03 X10(3)/MCL (ref 0–0.2)
BASOPHILS NFR BLD AUTO: 0.3 %
BILIRUBIN DIRECT+TOT PNL SERPL-MCNC: 1.4 MG/DL
BUN SERPL-MCNC: 19.5 MG/DL (ref 8.4–25.7)
CALCIUM SERPL-MCNC: 9.1 MG/DL (ref 8.8–10)
CHLORIDE SERPL-SCNC: 107 MMOL/L (ref 98–107)
CO2 SERPL-SCNC: 19 MMOL/L (ref 23–31)
CREAT SERPL-MCNC: 0.71 MG/DL (ref 0.73–1.18)
EOSINOPHIL # BLD AUTO: 0.04 X10(3)/MCL (ref 0–0.9)
EOSINOPHIL NFR BLD AUTO: 0.4 %
ERYTHROCYTE [DISTWIDTH] IN BLOOD BY AUTOMATED COUNT: 14.5 % (ref 11.5–17)
GFR SERPLBLD CREATININE-BSD FMLA CKD-EPI: >60 MLS/MIN/1.73/M2
GLOBULIN SER-MCNC: 3.2 GM/DL (ref 2.4–3.5)
GLUCOSE SERPL-MCNC: 104 MG/DL (ref 82–115)
HCT VFR BLD AUTO: 44.9 % (ref 42–52)
HGB BLD-MCNC: 15.1 G/DL (ref 14–18)
IMM GRANULOCYTES # BLD AUTO: 0.03 X10(3)/MCL (ref 0–0.04)
IMM GRANULOCYTES NFR BLD AUTO: 0.3 %
LYMPHOCYTES # BLD AUTO: 1.89 X10(3)/MCL (ref 0.6–4.6)
LYMPHOCYTES NFR BLD AUTO: 20.7 %
MAGNESIUM SERPL-MCNC: 1.9 MG/DL (ref 1.6–2.6)
MCH RBC QN AUTO: 28.9 PG (ref 27–31)
MCHC RBC AUTO-ENTMCNC: 33.6 G/DL (ref 33–36)
MCV RBC AUTO: 86 FL (ref 80–94)
MONOCYTES # BLD AUTO: 0.99 X10(3)/MCL (ref 0.1–1.3)
MONOCYTES NFR BLD AUTO: 10.9 %
NEUTROPHILS # BLD AUTO: 6.14 X10(3)/MCL (ref 2.1–9.2)
NEUTROPHILS NFR BLD AUTO: 67.4 %
NRBC BLD AUTO-RTO: 0 %
PHOSPHATE SERPL-MCNC: 3.9 MG/DL (ref 2.3–4.7)
PLATELET # BLD AUTO: 175 X10(3)/MCL (ref 130–400)
PMV BLD AUTO: 10.9 FL (ref 7.4–10.4)
POTASSIUM SERPL-SCNC: 4.1 MMOL/L (ref 3.5–5.1)
PROT SERPL-MCNC: 6.8 GM/DL (ref 5.8–7.6)
RBC # BLD AUTO: 5.22 X10(6)/MCL (ref 4.7–6.1)
SODIUM SERPL-SCNC: 137 MMOL/L (ref 136–145)
WBC # SPEC AUTO: 9.1 X10(3)/MCL (ref 4.5–11.5)

## 2023-04-30 PROCEDURE — 84100 ASSAY OF PHOSPHORUS: CPT | Performed by: STUDENT IN AN ORGANIZED HEALTH CARE EDUCATION/TRAINING PROGRAM

## 2023-04-30 PROCEDURE — 25000003 PHARM REV CODE 250: Performed by: NURSE PRACTITIONER

## 2023-04-30 PROCEDURE — 11000001 HC ACUTE MED/SURG PRIVATE ROOM

## 2023-04-30 PROCEDURE — 25000003 PHARM REV CODE 250: Performed by: INTERNAL MEDICINE

## 2023-04-30 PROCEDURE — S4991 NICOTINE PATCH NONLEGEND: HCPCS | Performed by: STUDENT IN AN ORGANIZED HEALTH CARE EDUCATION/TRAINING PROGRAM

## 2023-04-30 PROCEDURE — 25000003 PHARM REV CODE 250: Performed by: STUDENT IN AN ORGANIZED HEALTH CARE EDUCATION/TRAINING PROGRAM

## 2023-04-30 PROCEDURE — 83735 ASSAY OF MAGNESIUM: CPT | Performed by: STUDENT IN AN ORGANIZED HEALTH CARE EDUCATION/TRAINING PROGRAM

## 2023-04-30 PROCEDURE — 85025 COMPLETE CBC W/AUTO DIFF WBC: CPT | Performed by: STUDENT IN AN ORGANIZED HEALTH CARE EDUCATION/TRAINING PROGRAM

## 2023-04-30 PROCEDURE — 80053 COMPREHEN METABOLIC PANEL: CPT | Performed by: STUDENT IN AN ORGANIZED HEALTH CARE EDUCATION/TRAINING PROGRAM

## 2023-04-30 RX ORDER — POLYETHYLENE GLYCOL 3350 17 G/17G
17 POWDER, FOR SOLUTION ORAL DAILY
Status: DISCONTINUED | OUTPATIENT
Start: 2023-04-30 | End: 2023-05-15 | Stop reason: HOSPADM

## 2023-04-30 RX ADMIN — METOPROLOL TARTRATE 25 MG: 25 TABLET, FILM COATED ORAL at 08:04

## 2023-04-30 RX ADMIN — SACUBITRIL AND VALSARTAN 1 TABLET: 24; 26 TABLET, FILM COATED ORAL at 08:04

## 2023-04-30 RX ADMIN — NICOTINE 1 PATCH: 21 PATCH, EXTENDED RELEASE TRANSDERMAL at 08:04

## 2023-04-30 RX ADMIN — GLYCOPYRROLATE 1 MG: 1 TABLET ORAL at 08:04

## 2023-04-30 RX ADMIN — ASPIRIN 81 MG: 81 TABLET, COATED ORAL at 08:04

## 2023-04-30 RX ADMIN — MUPIROCIN: 20 OINTMENT TOPICAL at 08:04

## 2023-04-30 RX ADMIN — ATORVASTATIN CALCIUM 10 MG: 10 TABLET, FILM COATED ORAL at 08:04

## 2023-04-30 RX ADMIN — CLOPIDOGREL BISULFATE 75 MG: 75 TABLET ORAL at 08:04

## 2023-04-30 RX ADMIN — POLYETHYLENE GLYCOL 3350 17 G: 17 POWDER, FOR SOLUTION ORAL at 01:04

## 2023-04-30 NOTE — PROGRESS NOTES
[facility] Medicine Progress Note        Chief Complaint: Inpatient Follow-up    HPI:   Mr Mccrary is a 63 y.o. 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 King.  Patient was admitted to ICU postprocedure for monitoring.  He had a delayed recovery from anesthesia.  Patient has a history of right MCA stroke with left-sided residual weakness.  At some point while he was still under the effect of anesthesia, he was unable to move his right lower extremity which has since resolved.  Patient still have weakness in the left upper extremity, which per ICU nurse, his sister confirmed that it is residual from his previous stroke.  Sister reported that patient lives alone but he is pretty functional given he cooks for himself, does his grocery shopping, drives and mows his yard as well.    During ICU stay, overnight, had some confusion, CT head repeated was negative.  Patient did receive IV Ativan.    Patient speech is also dysarthric from his previous stroke.  Sister confirmed that every time patient eat or drink something he coughs.  That made as concern for swallow related issues and concern for aspiration as well.  Speech was consulted; following recs. Physical therapy and occupational therapy also consulted; will follow recs for DC planning. Noted to be somewhat confused but able to answer most questions. Nurse stating that this is patient's baseline. Patient is still has NG tube and is receiving TF.    Interval Hx:   Today, Mr Mccrary was seen at bedside.  He stated he was doing well and had no new complaints. Awaiting PT/OT/ST recs. Patient had 30 beat run of Vtach. If it recurs will  consult [facility] & order Trop, EKG.    Objective/physical exam:  General: In no acute distress, afebrile  Chest: Clear to auscultation bilaterally  Heart: RRR, +S1, S2, no appreciable murmur  Abdomen: NGT in place; Soft, nontender, BS +  MSK: Warm, no lower extremity edema, no clubbing or cyanosis  Neurologic: Alert and oriented x4, Cranial nerve II-XII intact, Strength 4/5 in LUE/LLE & 5/5 in RUE/RLE    VITAL SIGNS: 24 HRS MIN & MAX LAST   Temp  Min: 97.9 °F (36.6 °C)  Max: 98.8 °F (37.1 °C) 98.1 °F (36.7 °C)   BP  Min: 115/74  Max: 150/82 (!) 150/82   Pulse  Min: 66  Max: 81  81   Resp  Min: 16  Max: 21 17   SpO2  Min: 95 %  Max: 97 % 95 %     Recent Labs   Lab 04/28/23  0707 04/29/23  0757 04/30/23  0121   WBC 10.8 10.1 9.1   RBC 4.97 5.41 5.22   HGB 14.4 15.9 15.1   HCT 42.9 47.9 44.9   MCV 86.3 88.5 86.0   MCH 29.0 29.4 28.9   MCHC 33.6 33.2 33.6   RDW 15.1 15.0 14.5    209 175   MPV 10.4 10.9* 10.9*       Recent Labs   Lab 04/28/23  0259 04/29/23  0757 04/30/23  0121    138 137   K 4.3 4.3 4.1   CO2 21* 24 19*   BUN 11.5 15.1 19.5   CREATININE 0.70* 0.81 0.71*   CALCIUM 8.3* 9.3 9.1   MG 2.30 2.00 1.90   ALBUMIN 3.7 3.9 3.6   ALKPHOS 64 67 65   ALT 26 30 26   AST 29 32 26   BILITOT 1.4 1.6* 1.4       Assessment/Plan:  s/p Coil Embolization of L AMBER Aneurysm  Prior R MCA CVA with Residual LUE/LLE Weakness, L Facial Droop  HTN  Tobacco Smoking    Patient continues to be admitted for close monitoring   Noted to be at baseline in terms of mental status; noted to be somewhat confused but able to answer questions  Reporting no complaints presently   PT/OT on board; following recommendations   ST on board; performed MBS which patient failed; patient currently NPO; will follow recommendations  NGT in place; patient receiving TF   Patient continued on ASA, atorvastatin, Plavix, metoprolol tartrate, Entresto  Continue monitoring patient's symptoms    VTE prophylaxis: SCDs    Patient condition:   Stable    Anticipated discharge and Disposition:     Pending    All diagnosis and differential diagnosis have been reviewed; assessment and plan has been documented; I have personally reviewed the labs and test results that are presently available; I have reviewed the patients medication list; I have reviewed the consulting providers response and recommendations. I have reviewed or attempted to review medical records based upon their availability    All of the patient's questions have been  addressed and answered. Patient's is agreeable to the above stated plan. I will continue to monitor closely and make adjustments to medical management as needed.  _____________________________________________________________________    Nutrition Status: TF    Radiology:  X-Ray Chest 1 View  EXAMINATION  XR CHEST 1 VIEW    CLINICAL HISTORY  Shortness of breath;    TECHNIQUE  A total of 1 frontal image(s) submitted of the chest.    COMPARISON  7 January 2023    FINDINGS  Lines/tubes/devices: Esophagogastric tube is now visualized, extending the length of the esophagus and terminating beyond the lower image margin.  Left chest wall single lead ICD is unchanged.  ECG leads overlie the chest.    The cardiac silhouette and central vascular structures are unchanged.  The trachea is midline. No new or worsening consolidation is developed in the interval.  There is no large pleural effusion or convincing pneumothorax.    Regional osseous structures and extrathoracic soft tissues are similar.    IMPRESSION  1. No acute process or other adverse interval change.  2. Esophagogastric tube terminates inferior to the level of diaphragm.    Electronically signed by: Brandon Constantino  Date:    04/29/2023  Time:    09:49      Irvin Posada MD   04/30/2023

## 2023-04-30 NOTE — NURSING
Nurses Note -- 4 Eyes      4/30/2023   4:21 PM      Skin assessed during: Q Shift Change      [x] No Altered Skin Integrity Present    []Prevention Measures Documented      [] Yes- Altered Skin Integrity Present or Discovered   [] LDA Added if Not in Epic (Describe Wound)   [] New Altered Skin Integrity was Present on Admit and Documented in LDA   [] Wound Image Taken    Wound Care Consulted? No    Attending Nurse:  Shy Arenas RN     Second RN/Staff Member:  SUSAN Rose

## 2023-05-01 PROCEDURE — 25000003 PHARM REV CODE 250: Performed by: ANESTHESIOLOGY

## 2023-05-01 PROCEDURE — S4991 NICOTINE PATCH NONLEGEND: HCPCS | Performed by: STUDENT IN AN ORGANIZED HEALTH CARE EDUCATION/TRAINING PROGRAM

## 2023-05-01 PROCEDURE — 92526 ORAL FUNCTION THERAPY: CPT

## 2023-05-01 PROCEDURE — 97116 GAIT TRAINING THERAPY: CPT | Mod: CQ

## 2023-05-01 PROCEDURE — 25000003 PHARM REV CODE 250: Performed by: NURSE PRACTITIONER

## 2023-05-01 PROCEDURE — 25000003 PHARM REV CODE 250: Performed by: INTERNAL MEDICINE

## 2023-05-01 PROCEDURE — 94761 N-INVAS EAR/PLS OXIMETRY MLT: CPT

## 2023-05-01 PROCEDURE — 25000003 PHARM REV CODE 250: Performed by: STUDENT IN AN ORGANIZED HEALTH CARE EDUCATION/TRAINING PROGRAM

## 2023-05-01 PROCEDURE — 11000001 HC ACUTE MED/SURG PRIVATE ROOM

## 2023-05-01 PROCEDURE — 97535 SELF CARE MNGMENT TRAINING: CPT | Mod: CO

## 2023-05-01 RX ADMIN — SACUBITRIL AND VALSARTAN 1 TABLET: 24; 26 TABLET, FILM COATED ORAL at 08:05

## 2023-05-01 RX ADMIN — ATORVASTATIN CALCIUM 10 MG: 10 TABLET, FILM COATED ORAL at 08:05

## 2023-05-01 RX ADMIN — METOPROLOL TARTRATE 25 MG: 25 TABLET, FILM COATED ORAL at 08:05

## 2023-05-01 RX ADMIN — POLYETHYLENE GLYCOL 3350 17 G: 17 POWDER, FOR SOLUTION ORAL at 08:05

## 2023-05-01 RX ADMIN — MUPIROCIN: 20 OINTMENT TOPICAL at 08:05

## 2023-05-01 RX ADMIN — ACETAMINOPHEN 650 MG: 325 TABLET ORAL at 08:05

## 2023-05-01 RX ADMIN — NICOTINE 1 PATCH: 21 PATCH, EXTENDED RELEASE TRANSDERMAL at 08:05

## 2023-05-01 RX ADMIN — CLOPIDOGREL BISULFATE 75 MG: 75 TABLET ORAL at 08:05

## 2023-05-01 RX ADMIN — ASPIRIN 81 MG: 81 TABLET, COATED ORAL at 08:05

## 2023-05-01 NOTE — NURSING
Nurses Note -- 4 Eyes      5/1/2023   1:04 PM      Skin assessed during: Transfer      [x] No Altered Skin Integrity Present    []Prevention Measures Documented      [] Yes- Altered Skin Integrity Present or Discovered   [] LDA Added if Not in Epic (Describe Wound)   [] New Altered Skin Integrity was Present on Admit and Documented in LDA   [] Wound Image Taken    Wound Care Consulted? No    Attending Nurse:  RAMONA Diaz RN/Staff Member:  Brandy GOMES RN

## 2023-05-01 NOTE — NURSING
Nurses Note -- 4 Eyes      5/1/2023   6:29 AM      Skin assessed during: Daily Assessment      [x] No Altered Skin Integrity Present    [x]Prevention Measures Documented      [] Yes- Altered Skin Integrity Present or Discovered   [] LDA Added if Not in Epic (Describe Wound)   [] New Altered Skin Integrity was Present on Admit and Documented in LDA   [] Wound Image Taken    Wound Care Consulted? No    Attending Nurse:  Kentrell Chowdary RN     Second RN/Staff Member:  Roberta DAVIS

## 2023-05-01 NOTE — PT/OT/SLP PROGRESS
Speech Language Pathology Department  Dysphagia Therapy Progress Note    Patient Name:  Marino Mccrary   MRN:  [id number]  Admitting Diagnosis: unruptured cerebral aneurysm s/p coiling    Recommendations:     General recommendations:  dysphagia therapy  Diet recommendations:  NPO, Liquid Diet Level: NPO     Discharge recommendations:  nursing facility, skilled vs. LTAC  Barriers to safe discharge:  severity of impairment and level of skilled assistance needed    Subjective     Patient awake, alert, and flat.    Pain/Comfort: Pain Rating 1: 0/10    Spiritual/Cultural/Voodoo Beliefs/Practices that affect care: no    Respiratory Status: room air    Objective:     Oral Musculature  Dentition: missing teeth  Secretion Management: coughing on secretions  Mucosal Quality: good  Facial Movement: general weakness  Buccal Strength & Mobility: impaired  Mandibular Strength & Mobility: impaired  Oral Labial Strength & Mobility: impaired pursing  Lingual Strength & Mobility: impaired strength  Velar Elevation: unable to assess  Vocal Quality: hoarse and strained/strangled  Volitional Cough: Nonproductive    Therapeutic Activities:  Pt completed base of tongue and laryngeal x40 with moderate-maximum cues.  Pt tolerated thermal stimulation to the anterior faucial pillars x10 with 80% swallow responses.  Laryngeal excursion fair, however delay was greater that 8 seconds.     Assessment:     Pt continues to present with oropharyngeal dysphagia and remains unsafe for PO intake.    Goals:     Multidisciplinary Problems       SLP Goals          Problem: SLP    Goal Priority Disciplines Outcome   SLP Goal     SLP Ongoing, Progressing   Description:   LTG: Tolerate least restrictive PO diet with no clinical signs/sx aspiration  STGs:  1.  Complete base of tongue and laryngeal strengthening exercises with minimal cues  2.  Tolerate thermal stimulation to the anterior faucial pillars with 100% effortful swallow responses and delay less  than 2 seconds.                       Patient Education:     Patient provided with verbal education regarding SLP POC.  Understanding was verbalized, however additional teaching warranted.    Plan:     Will continue to follow and tx as appropriate.    SLP Follow-Up:  Yes   Patient to be seen:  daily   Plan of Care expires:  05/05/23  Plan of Care reviewed with:  patient       Time Tracking:     SLP Treatment Date:   05/01/23  Speech Start Time:  1430  Speech Stop Time:  1445     Speech Total Time (min):  15 min    Billable minutes:  Treatment of Swallow Dysfunction, 15 minutes        05/01/2023

## 2023-05-01 NOTE — PROGRESS NOTES
Inpatient Nutrition Assessment    Admit Date: 4/26/2023   Total duration of encounter: 5 days     Nutrition Recommendation/Prescription     Change TF recommendations to the following now pt is out of ICU: Fibersource HN  @ 75 mL/hr with 100mL free water q4hrs.   Kcal: 1800 kcal/day (~99% est kcal needs)  Protein: 81 g/day (~90% est protein needs)  Fluid: 1815 mL (~100% est fluid needs)    Monitor tolerance, weight,  and labs    Communication of Recommendations: reviewed with nurse    Nutrition Assessment     Malnutrition Assessment/Nutrition-Focused Physical Exam    Malnutrition in the context of acute illness or injury  Degree of Malnutrition: unable to complete  Energy Intake: unable to obtain  Interpretation of Weight Loss: >7.5% in 3 months (per EMR)  Body Fat:does not meet criteria  Area of Body Fat Loss: does not meet criteria  Muscle Mass Loss: does not meet criteria  Area of Muscle Mass Loss: does not meet criteria  Fluid Accumulation: unable to obtain  Edema: unable to obtain   Reduced  Strength: unable to obtain  A minimum of two characteristics is recommended for diagnosis of either severe or non-severe malnutrition.    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  History of CVA to the right MCA with residual left-sided upper and lower extremity weakness and left-sided facial droop  Hypertension    Relevant Medical History:    Acid reflux      Cerebral aneurysm      Heart disease      Hypertension      Mixed hyperlipidemia        Nutrition-Related Medications: miralax  Calorie Containing IV Medications: no significant kcals from medications at this time    Nutrition-Related Labs:  4/29/2023: Total Bili: 1.6 and Glucose 93  4/30: Crea-0.71    Diet/PN Order: Diet  "NPO  Oral Supplement Order: none  Tube Feeding Order:  Peptamen AF @ 75 mL/hr (see below for calculation)   Appetite/Oral Intake: NPO/NPO  Factors Affecting Nutritional Intake: NPO  Food/Jew/Cultural Preferences: unable to obtain  Food Allergies: unable to obtain       Wound(s):   none noted.    Comments    2023: Pt unable to answer questions. Per EMR, pt weighed 83.9 kg on 2023 (11% wt loss in 3 months, significant). Provided TF recommendations of Peptamen @ 75 mL/hr. Pt currently has Peptamen @ 10 mL/hr running, no issues noted thus far per nurse. Will monitor.  23: Pt out of ICU now. Continues on tube feeds. Will change Tube feed formula and adjust nutritional needs accordingly.     Anthropometrics    Height: 5' 8" (172.7 cm) Height Method: Stated  Last Weight: 74.8 kg (164 lb 14.5 oz) (23 1026) Weight Method: Standard Scale  BMI (Calculated): 25.1  BMI Classification: overweight (BMI 25-29.9)        Ideal Body Weight (IBW), Male: 154 lb     % Ideal Body Weight, Male (lb): 107.08 %                 Usual Body Weight (UBW), k.9 kg  % Usual Body Weight: 89.34     Usual Weight Provided By: EMR weight history    Wt Readings from Last 5 Encounters:   23 74.8 kg (164 lb 14.5 oz)   23 77 kg (169 lb 12.1 oz)   23 83.9 kg (185 lb)   23 83.9 kg (185 lb)     Weight Change(s) Since Admission:  Admit Weight: 74.8 kg (164 lb 14.5 oz) (23 1026)  2023: 74.8 kg  : no new wt    Estimated Needs    Weight Used For Calorie Calculations: 74.8 kg (164 lb 14.5 oz)  Energy Calorie Requirements (kcal): 7633-6828 (MSJ x 1.2-1.3)  Energy Need Method: [place]-St Jeor  Weight Used For Protein Calculations: 74.8 kg (164 lb 14.5 oz)  Protein Requirements:  (1.2-2.0 g/kg)  Fluid Requirements (mL): 1816 (1 mL/kcal)           Enteral Nutrition    Formula: Peptamen AF  Rate/Volume: 10 mL/hr  Water Flushes: N/A  Additives/Modulars: none at this time  Route: nasogastric " tube  Method: continuous  Total Nutrition Provided by Tube Feeding, Additives, and Flushes:  Calories Provided  240 kcal/d, 13% needs   Protein Provided  15 g/d, 16% needs   Fluid Provided  78 ml/d, 4% needs   Continuous feeding calculations based on estimated 20 hr/d run time unless otherwise stated.    Parenteral Nutrition    Patient not receiving parenteral nutrition support at this time.    Evaluation of Received Nutrient Intake    Calories: not meeting estimated needs  Protein: not meeting estimated needs    Patient Education    Not applicable.    Nutrition Diagnosis     PES: Increased nutrient needs related to acute illness as evidenced by increased nutrient needs due to hospitalization (ICU). (continues)    Interventions/Goals     Intervention(s): modified composition of enteral nutrition  Goal: Tolerate enteral feeding at goal rate by follow-up. (goal progressing)    Monitoring & Evaluation     Dietitian will monitor enteral nutrition intake, weight, electrolyte/renal panel, glucose/endocrine profile, and gastrointestinal profile.  Nutrition Risk/Follow-Up: moderate (follow-up in 3-5 days)   Please consult if re-assessment needed sooner.

## 2023-05-01 NOTE — PT/OT/SLP PROGRESS
Occupational Therapy   Treatment    Name: Marino Mccrary  MRN: [id number]  Admitting Diagnosis:  <principal problem not specified>  5 Days Post-Op    Recommendations:     Discharge Recommendations: nursing facility, skilled  Discharge Equipment Recommendations:  walker, rolling  Barriers to discharge:       Assessment:     Marino Mccrary is a 63 y.o. male with a medical diagnosis of <principal problem not specified>.  Performance deficits affecting function are weakness, impaired endurance, impaired balance.     Rehab Prognosis:  Good; patient would benefit from acute skilled OT services to address these deficits and reach maximum level of function.       Plan:     Patient to be seen 5 x/week to address the above listed problems via self-care/home management, therapeutic activities, therapeutic exercises  Plan of Care Expires: 05/10/23  Plan of Care Reviewed with: patient    Subjective     Pain/Comfort:       Objective:     Communicated with: RN prior to session.  Patient found HOB elevated with   upon OT entry to room.    General Precautions: Standard, aspiration    Orthopedic Precautions:N/A  Braces: N/A  Respiratory Status: Room air       Occupational Performance:   (Bed Mobility-Min A)  (Sitting balance-SBA)  (Sit to stand- Min A)  Pt. Ambulating from EOB to sink using RW for UE support with balance, Min A for balance with increased cueing required for safety, slight difficulty noted with grasp on RW (L hand).  Pt. Requiring Min A for static standing balance at sink while performing grooming task (brushing teeth) with appropriate setup/preparation noted using R UE for excursion to mouth.   Pt. Then t/f BTB, left with all needs within reach. HOB elevated    Therapeutic Positioning    OT interventions performed during the course of today's session in an effort to prevent and/or reduce acquired pressure injuries:   Therapeutic positioning completed       [facility] 6 Click ADL:      Patient Education:  Patient  provided with verbal education regarding pressure ulcer prevention.  Additional teaching is warranted.      Patient left HOB elevated with all lines intact, call button in reach, and bed alarm on    GOALS:   Multidisciplinary Problems       Occupational Therapy Goals          Problem: Occupational Therapy    Goal Priority Disciplines Outcome Interventions   Occupational Therapy Goal     OT, PT/OT Ongoing, Progressing    Description: Goals to be met by: Discharge     Patient will increase functional independence with ADLs by performing:    LE Dressing with Modified [place].  Grooming while standing at sink with Modified [place].  Toileting from toilet with Modified [place] for hygiene and clothing management.   Bathing from  shower chair/bench with Modified [place].  Toilet transfer to toilet with Modified [place].                         Time Tracking:     OT Date of Treatment: 05/01/23  OT Start Time: 1417  OT Stop Time: 1430  OT Total Time (min): 13 min    Billable Minutes:Self Care/Home Management 1    OT/EDVIN: EDVIN     Number of EDVIN visits since last OT visit: 1    5/1/2023

## 2023-05-01 NOTE — PT/OT/SLP PROGRESS
Speech Language Pathology Department  Dysphagia Therapy Progress Note    Patient Name:  Marino Mccrary   MRN:  [id number]  Admitting Diagnosis: unruptured cerebral aneurysm s/p coiling    Recommendations:     General recommendations:  dysphagia therapy  Diet recommendations:  NPO, Liquid Diet Level: NPO     Discharge recommendations:  nursing facility, skilled, LTACH (long-term acute [facility])   Barriers to safe discharge:  severity of impairment, limited insight into deficits, and level of skilled assistance needed    Subjective     Patient awake, alert, flat, and confused.    Pain/Comfort: Pain Rating 1: 0/10    Spiritual/Cultural/Buddhism Beliefs/Practices that affect care: no    Respiratory Status: room air    Objective:     Oral Musculature Evaluation:  Oral Musculature: general weakness  Secretion Management: coughing on secretions  Volitional Cough: productive  Voice Prior to PO Intake: hoarse    Therapeutic Activities:  Pt completed base of tongue and laryngeal x30 with moderate-maximum cues.  Pt tolerated thermal stimulation to the anterior faucial pillars x5 with 100% swallow responses.  Laryngeal excursion good, however delay greater than 10 seconds.     Assessment:     Pt continues to present with oropharyngeal dysphagia and remains unsafe for PO intake.    Goals:     Multidisciplinary Problems       SLP Goals          Problem: SLP    Goal Priority Disciplines Outcome   SLP Goal     SLP Ongoing, Progressing   Description:   LTG: Tolerate least restrictive PO diet with no clinical signs/sx aspiration  STGs:  1.  Complete base of tongue and laryngeal strengthening exercises with minimal cues  2.  Tolerate thermal stimulation to the anterior faucial pillars with 100% effortful swallow responses and delay less than 2 seconds.                       Patient Education:     Patient provided with verbal education regarding SLP POC.  Additional teaching is warranted.    Plan:     Will continue to follow and  tx as appropriate.    SLP Follow-Up:  Yes   Patient to be seen:  daily   Plan of Care expires:  05/05/23  Plan of Care reviewed with:  patient       Time Tracking:     SLP Treatment Date:   05/01/23  Speech Start Time:  0945  Speech Stop Time:  1000     Speech Total Time (min):  15 min    Billable minutes:  Treatment of Swallow Dysfunction, 15 minutes        05/01/2023

## 2023-05-01 NOTE — PROGRESS NOTES
[facility] Hospital Medicine Progress Note        Chief Complaint: Inpatient Follow-up    HPI:   Mr Mccrary is a 63 y.o. 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 King.  Patient was admitted to ICU postprocedure for monitoring.  He had a delayed recovery from anesthesia.  Patient has a history of right MCA stroke with left-sided residual weakness.  At some point while he was still under the effect of anesthesia, he was unable to move his right lower extremity which has since resolved.  Patient still have weakness in the left upper extremity, which per ICU nurse, his sister confirmed that it is residual from his previous stroke.  Sister reported that patient lives alone but he is pretty functional given he cooks for himself, does his grocery shopping, drives and mows his yard as well.    During ICU stay, overnight, had some confusion, CT head repeated was negative.  Patient did receive IV Ativan.    Patient speech is also dysarthric from his previous stroke.  Sister confirmed that every time patient eat or drink something he coughs. That made as concern for swallow related issues and concern for aspiration as well.  Speech was consulted; following recs. Physical therapy and occupational therapy also consulted; will follow recs for DC planning. Noted to be somewhat confused but able to answer most questions. Nurse stating that this is patient's baseline. Patient is still has NG tube and is receiving TF.     Interval Hx:   Today, Mr Mccrary was seen at bedside.  He stated he was doing well and had no new complaints. Awaiting PT/OT/ST recs. Stated he was ready to go home.    Objective/physical  exam:  General: In no acute distress, afebrile  Chest: Clear to auscultation bilaterally  Heart: RRR, +S1, S2, no appreciable murmur  Abdomen: NGT in place; Soft, nontender, BS +  MSK: Warm, no lower extremity edema, no clubbing or cyanosis  Neurologic: Alert and oriented x4, Cranial nerve II-XII intact, Strength 4/5 in LUE/LLE & 5/5 in RUE/RLE    VITAL SIGNS: 24 HRS MIN & MAX LAST   Temp  Min: 98.6 °F (37 °C)  Max: 99.8 °F (37.7 °C) 99.8 °F (37.7 °C)   BP  Min: 104/72  Max: 130/81 107/68   Pulse  Min: 63  Max: 95  69   Resp  Min: 15  Max: 25 20   SpO2  Min: 90 %  Max: 97 % (!) 93 %     Recent Labs   Lab 04/28/23  0707 04/29/23  0757 04/30/23  0121   WBC 10.8 10.1 9.1   RBC 4.97 5.41 5.22   HGB 14.4 15.9 15.1   HCT 42.9 47.9 44.9   MCV 86.3 88.5 86.0   MCH 29.0 29.4 28.9   MCHC 33.6 33.2 33.6   RDW 15.1 15.0 14.5    209 175   MPV 10.4 10.9* 10.9*       Recent Labs   Lab 04/28/23  0259 04/29/23  0757 04/30/23  0121    138 137   K 4.3 4.3 4.1   CO2 21* 24 19*   BUN 11.5 15.1 19.5   CREATININE 0.70* 0.81 0.71*   CALCIUM 8.3* 9.3 9.1   MG 2.30 2.00 1.90   ALBUMIN 3.7 3.9 3.6   ALKPHOS 64 67 65   ALT 26 30 26   AST 29 32 26   BILITOT 1.4 1.6* 1.4       Assessment/Plan:  s/p Coil Embolization of L AMBER Aneurysm  Prior R MCA CVA with Residual LUE/LLE Weakness, L Facial Droop  HTN  Tobacco Smoking    Patient continues to be admitted for close monitoring   Noted to be at baseline in terms of mental status; noted to be somewhat confused but able to answer questions  Reporting no complaints presently   PT/OT on board; following recommendations   ST on board; performed MBS which patient failed; patient currently NPO; will follow recommendations  NGT in place; patient receiving TF   Patient continued on ASA, Atorvastatin, Plavix, Metoprolol Tartrate, Entresto  Continue monitoring patient's symptoms    VTE prophylaxis: SCDs    Patient condition:  Stable    Anticipated discharge and Disposition:     Pending    All  diagnosis and differential diagnosis have been reviewed; assessment and plan has been documented; I have personally reviewed the labs and test results that are presently available; I have reviewed the patients medication list; I have reviewed the consulting providers response and recommendations. I have reviewed or attempted to review medical records based upon their availability    All of the patient's questions have been  addressed and answered. Patient's is agreeable to the above stated plan. I will continue to monitor closely and make adjustments to medical management as needed.  _____________________________________________________________________    Nutrition Status: TF    Radiology:  X-Ray Chest 1 View  EXAMINATION  XR CHEST 1 VIEW    CLINICAL HISTORY  Shortness of breath;    TECHNIQUE  A total of 1 frontal image(s) submitted of the chest.    COMPARISON  7 January 2023    FINDINGS  Lines/tubes/devices: Esophagogastric tube is now visualized, extending the length of the esophagus and terminating beyond the lower image margin.  Left chest wall single lead ICD is unchanged.  ECG leads overlie the chest.    The cardiac silhouette and central vascular structures are unchanged.  The trachea is midline. No new or worsening consolidation is developed in the interval.  There is no large pleural effusion or convincing pneumothorax.    Regional osseous structures and extrathoracic soft tissues are similar.    IMPRESSION  1. No acute process or other adverse interval change.  2. Esophagogastric tube terminates inferior to the level of diaphragm.    Electronically signed by: Brandon Constantino  Date:    04/29/2023  Time:    09:49      Irvin Posada MD   05/01/2023

## 2023-05-01 NOTE — PT/OT/SLP PROGRESS
Physical Therapy Treatment    Patient Name:  Marino Mccrary   MRN:  [id number]    Recommendations:     Discharge Recommendations: nursing facility, skilled  Discharge Equipment Recommendations: walker, rolling  Barriers to discharge: Impaired mobility    Assessment:     Marino Mccrary is a 63 y.o. male admitted with a medical diagnosis of L AMBER aneurysm s/p coil embolization. Pt with hx of R MCA CVA.  He presents with the following impairments/functional limitations: weakness, impaired endurance, impaired balance, gait instability, impaired cognition .    Rehab Prognosis: Good; patient would benefit from acute skilled PT services to address these deficits and reach maximum level of function.    Recent Surgery: * No procedures listed * 5 Days Post-Op    Plan:     During this hospitalization, patient to be seen 6 x/week to address the identified rehab impairments via gait training, therapeutic activities, therapeutic exercises and progress toward the following goals:    Plan of Care Expires:  05/28/23    Subjective     Chief Complaint: none  Patient/Family Comments/goals: to go home  Pain/Comfort:  Pain Rating 1: 0/10      Objective:     Communicated with RN prior to session.  Patient found HOB elevated with pulse ox (continuous), telemetry and NGT upon PT entry to room.     General Precautions: Standard, aspiration  Orthopedic Precautions: N/A  Braces: N/A  Respiratory Status: Room air  Vitals: , SPO2 96-98% with activity  Skin Integrity: Visible skin intact      Functional Mobility:  Bed Mobility:  Supine<->sit with SBA  Transfers:  Sit<->stand with SBA  Gait: 200' x 2 with RW with notable decrease in L foot clearance with fatigue; pt at increased risk of falls.     Therapeutic Activities/Exercises:      Education:  Patient provided with verbal education regarding POC.  Understanding was verbalized, however additional teaching warranted.     Patient left HOB elevated with all lines intact and call button  in reach..    GOALS:   Multidisciplinary Problems       Physical Therapy Goals          Problem: Physical Therapy    Goal Priority Disciplines Outcome Goal Variances Interventions   Physical Therapy Goal     PT, PT/OT Ongoing, Progressing     Description: Goals to be met by: 23     Patient will increase functional independence with mobility by performin. Supine to sit with Stand-by Assistance  2. Sit to supine with Stand-by Assistance  3. Sit to stand transfer with Stand-by Assistance  4. Gait  x 150 feet with Stand-by Assistance using No Assistive Device.                          Time Tracking:     PT Received On: 23  PT Start Time: 1340     PT Stop Time: 1356  PT Total Time (min): 16 min     Billable Minutes: Gait Training 1 unit    Treatment Type: Treatment  PT/PTA: PTA     Number of PTA visits since last PT visit: 2     2023

## 2023-05-02 LAB
ALBUMIN SERPL-MCNC: 3.4 G/DL (ref 3.4–4.8)
ALBUMIN/GLOB SERPL: 1 RATIO (ref 1.1–2)
ALP SERPL-CCNC: 61 UNIT/L (ref 40–150)
ALT SERPL-CCNC: 18 UNIT/L (ref 0–55)
AST SERPL-CCNC: 18 UNIT/L (ref 5–34)
BASOPHILS # BLD AUTO: 0.04 X10(3)/MCL
BASOPHILS NFR BLD AUTO: 0.4 %
BILIRUBIN DIRECT+TOT PNL SERPL-MCNC: 1.1 MG/DL
BUN SERPL-MCNC: 29.6 MG/DL (ref 8.4–25.7)
CALCIUM SERPL-MCNC: 9.2 MG/DL (ref 8.8–10)
CHLORIDE SERPL-SCNC: 106 MMOL/L (ref 98–107)
CO2 SERPL-SCNC: 20 MMOL/L (ref 23–31)
CREAT SERPL-MCNC: 0.83 MG/DL (ref 0.73–1.18)
EOSINOPHIL # BLD AUTO: 0.06 X10(3)/MCL (ref 0–0.9)
EOSINOPHIL NFR BLD AUTO: 0.6 %
ERYTHROCYTE [DISTWIDTH] IN BLOOD BY AUTOMATED COUNT: 14.6 % (ref 11.5–17)
GFR SERPLBLD CREATININE-BSD FMLA CKD-EPI: >60 MLS/MIN/1.73/M2
GLOBULIN SER-MCNC: 3.3 GM/DL (ref 2.4–3.5)
GLUCOSE SERPL-MCNC: 131 MG/DL (ref 82–115)
HCT VFR BLD AUTO: 45.8 % (ref 42–52)
HGB BLD-MCNC: 15.4 G/DL (ref 14–18)
IMM GRANULOCYTES # BLD AUTO: 0.06 X10(3)/MCL (ref 0–0.04)
IMM GRANULOCYTES NFR BLD AUTO: 0.6 %
LYMPHOCYTES # BLD AUTO: 2.31 X10(3)/MCL (ref 0.6–4.6)
LYMPHOCYTES NFR BLD AUTO: 23.5 %
MAGNESIUM SERPL-MCNC: 2.1 MG/DL (ref 1.6–2.6)
MCH RBC QN AUTO: 29.2 PG (ref 27–31)
MCHC RBC AUTO-ENTMCNC: 33.6 G/DL (ref 33–36)
MCV RBC AUTO: 86.7 FL (ref 80–94)
MONOCYTES # BLD AUTO: 1.1 X10(3)/MCL (ref 0.1–1.3)
MONOCYTES NFR BLD AUTO: 11.2 %
NEUTROPHILS # BLD AUTO: 6.25 X10(3)/MCL (ref 2.1–9.2)
NEUTROPHILS NFR BLD AUTO: 63.7 %
NRBC BLD AUTO-RTO: 0 %
PLATELET # BLD AUTO: 237 X10(3)/MCL (ref 130–400)
PMV BLD AUTO: 10.5 FL (ref 7.4–10.4)
POTASSIUM SERPL-SCNC: 4.1 MMOL/L (ref 3.5–5.1)
PROT SERPL-MCNC: 6.7 GM/DL (ref 5.8–7.6)
RBC # BLD AUTO: 5.28 X10(6)/MCL (ref 4.7–6.1)
SODIUM SERPL-SCNC: 136 MMOL/L (ref 136–145)
WBC # SPEC AUTO: 9.82 X10(3)/MCL (ref 4.5–11.5)

## 2023-05-02 PROCEDURE — 85025 COMPLETE CBC W/AUTO DIFF WBC: CPT | Performed by: STUDENT IN AN ORGANIZED HEALTH CARE EDUCATION/TRAINING PROGRAM

## 2023-05-02 PROCEDURE — 11000001 HC ACUTE MED/SURG PRIVATE ROOM

## 2023-05-02 PROCEDURE — 25000003 PHARM REV CODE 250: Performed by: STUDENT IN AN ORGANIZED HEALTH CARE EDUCATION/TRAINING PROGRAM

## 2023-05-02 PROCEDURE — 97530 THERAPEUTIC ACTIVITIES: CPT | Mod: CO

## 2023-05-02 PROCEDURE — 92526 ORAL FUNCTION THERAPY: CPT

## 2023-05-02 PROCEDURE — 25000003 PHARM REV CODE 250: Performed by: INTERNAL MEDICINE

## 2023-05-02 PROCEDURE — 83735 ASSAY OF MAGNESIUM: CPT | Performed by: STUDENT IN AN ORGANIZED HEALTH CARE EDUCATION/TRAINING PROGRAM

## 2023-05-02 PROCEDURE — 97530 THERAPEUTIC ACTIVITIES: CPT

## 2023-05-02 PROCEDURE — 25000003 PHARM REV CODE 250: Performed by: NURSE PRACTITIONER

## 2023-05-02 PROCEDURE — 80053 COMPREHEN METABOLIC PANEL: CPT | Performed by: STUDENT IN AN ORGANIZED HEALTH CARE EDUCATION/TRAINING PROGRAM

## 2023-05-02 PROCEDURE — S4991 NICOTINE PATCH NONLEGEND: HCPCS | Performed by: STUDENT IN AN ORGANIZED HEALTH CARE EDUCATION/TRAINING PROGRAM

## 2023-05-02 RX ADMIN — POLYETHYLENE GLYCOL 3350 17 G: 17 POWDER, FOR SOLUTION ORAL at 09:05

## 2023-05-02 RX ADMIN — SACUBITRIL AND VALSARTAN 1 TABLET: 24; 26 TABLET, FILM COATED ORAL at 09:05

## 2023-05-02 RX ADMIN — ATORVASTATIN CALCIUM 10 MG: 10 TABLET, FILM COATED ORAL at 09:05

## 2023-05-02 RX ADMIN — SCOPOLAMINE 1 PATCH: 1 PATCH TRANSDERMAL at 05:05

## 2023-05-02 RX ADMIN — CLOPIDOGREL BISULFATE 75 MG: 75 TABLET ORAL at 09:05

## 2023-05-02 RX ADMIN — ASPIRIN 81 MG: 81 TABLET, COATED ORAL at 09:05

## 2023-05-02 RX ADMIN — SACUBITRIL AND VALSARTAN 1 TABLET: 24; 26 TABLET, FILM COATED ORAL at 08:05

## 2023-05-02 RX ADMIN — NICOTINE 1 PATCH: 21 PATCH, EXTENDED RELEASE TRANSDERMAL at 09:05

## 2023-05-02 RX ADMIN — METOPROLOL TARTRATE 25 MG: 25 TABLET, FILM COATED ORAL at 09:05

## 2023-05-02 RX ADMIN — METOPROLOL TARTRATE 25 MG: 25 TABLET, FILM COATED ORAL at 08:05

## 2023-05-02 NOTE — PROGRESS NOTES
[facility] Progress [facility]        Chief Complaint: Inpatient Follow-up    HPI:   Mr Mccrary is a 63 y.o. 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 King.  Patient was admitted to ICU postprocedure for monitoring.  He had a delayed recovery from anesthesia.  Patient has a history of right MCA stroke with left-sided residual weakness.  At some point while he was still under the effect of anesthesia, he was unable to move his right lower extremity which has since resolved.  Patient still have weakness in the left upper extremity, which per ICU nurse, his sister confirmed that it is residual from his previous stroke.  Sister reported that patient lives alone but he is pretty functional given he cooks for himself, does his grocery shopping, drives and mows his yard as well.    During ICU stay, overnight, had some confusion, CT head repeated was negative.  Patient did receive IV Ativan.    Patient speech is also dysarthric from his previous stroke.  Sister confirmed that every time patient eat or drink something he coughs. That made as concern for swallow related issues and concern for aspiration as well.  Speech was consulted; following recs. Physical therapy and occupational therapy also consulted; will follow recs for DC planning. Noted to be somewhat confused but able to answer most questions. Nurse stating that this is patient's baseline. Patient is still has NG tube and is receiving TF.     Interval Hx:   Laying in bed.  Denied any complaints.  Discuss therapy and speech will continue to work with him.  No family is at bedside  Case discussed with patient's nurse on the  floor    Objective/physical exam:  General: In no acute distress, afebrile  Chest:  Coarse breath sounds anteriorly, on room air  Heart: RRR, +S1, S2, no appreciable murmur  Abdomen: NGT in place; Soft, nontender, BS +  MSK: Warm, no lower extremity edema, no clubbing or cyanosis  Neurologic:  Awake and alert and oriented.  Speech has improved since I last saw him on 4/28,  Strength 4/5 in LUE/LLE & 5/5 in RUE/RLE    VITAL SIGNS: 24 HRS MIN & MAX LAST   Temp  Min: 97.6 °F (36.4 °C)  Max: 99.8 °F (37.7 °C) 98.2 °F (36.8 °C)   BP  Min: 104/72  Max: 125/78 124/78   Pulse  Min: 64  Max: 95  68   Resp  Min: 15  Max: 25 18   SpO2  Min: 92 %  Max: 96 % 96 %     Recent Labs   Lab 04/29/23  0757 04/30/23  0121 05/02/23  0459   WBC 10.1 9.1 9.82   RBC 5.41 5.22 5.28   HGB 15.9 15.1 15.4   HCT 47.9 44.9 45.8   MCV 88.5 86.0 86.7   MCH 29.4 28.9 29.2   MCHC 33.2 33.6 33.6   RDW 15.0 14.5 14.6    175 237   MPV 10.9* 10.9* 10.5*       Recent Labs   Lab 04/29/23  0757 04/30/23  0121 05/02/23  0459    137 136   K 4.3 4.1 4.1   CO2 24 19* 20*   BUN 15.1 19.5 29.6*   CREATININE 0.81 0.71* 0.83   CALCIUM 9.3 9.1 9.2   MG 2.00 1.90 2.10   ALBUMIN 3.9 3.6 3.4   ALKPHOS 67 65 61   ALT 30 26 18   AST 32 26 18   BILITOT 1.6* 1.4 1.1       Assessment/Plan:  s/p Coil Embolization of L AMBER Aneurysm  Prior R MCA CVA with Residual LUE/LLE Weakness, L Facial Droop  HTN  Nicotine dependence with current smoking status      Admitted to ICU on 04/26/2023, downgraded to hospital medicine team on 04/28/2023  Noted to be at baseline in terms of mental status; noted to be somewhat confused but able to answer questions  Reporting no complaints presently   PT/OT on board--> recommended sniff  ST on board; performed MBS which patient failed; patient currently NPO; will follow recommendations, today is day 6 that he has an NG tube in.  Will discuss with speech therapy, if his speech is not improving, he will need PEG tube prior to SNF  placement  Currently NGT in place; patient receiving TF   Patient continued on ASA, Atorvastatin, Plavix, Metoprolol Tartrate, Entresto  Continue monitoring patient's symptoms  Morning CBC, CMP stable, repeat Thursday     VTE prophylaxis: SCDs    Patient condition:  Stable    Anticipated discharge and Disposition:   SNF once medically ready, probable PEG tube       All diagnosis and differential diagnosis have been reviewed; assessment and plan has been documented; I have personally reviewed the labs and test results that are presently available; I have reviewed the patients medication list; I have reviewed the consulting providers response and recommendations. I have reviewed or attempted to review medical records based upon their availability    All of the patient's questions have been  addressed and answered. Patient's is agreeable to the above stated plan. I will continue to monitor closely and make adjustments to medical management as needed.  _____________________________________________________________________    Nutrition Status: TF    Radiology:  X-Ray Chest 1 View  EXAMINATION  XR CHEST 1 VIEW    CLINICAL HISTORY  Shortness of breath;    TECHNIQUE  A total of 1 frontal image(s) submitted of the chest.    COMPARISON  7 January 2023    FINDINGS  Lines/tubes/devices: Esophagogastric tube is now visualized, extending the length of the esophagus and terminating beyond the lower image margin.  Left chest wall single lead ICD is unchanged.  ECG leads overlie the chest.    The cardiac silhouette and central vascular structures are unchanged.  The trachea is midline. No new or worsening consolidation is developed in the interval.  There is no large pleural effusion or convincing pneumothorax.    Regional osseous structures and extrathoracic soft tissues are similar.    IMPRESSION  1. No acute process or other adverse interval change.  2. Esophagogastric tube terminates inferior to the level of  diaphragm.    Electronically signed by: Brandon Constantino  Date:    04/29/2023  Time:    09:49      Kobe Antoine MD  Department of Hospital Medicine   [facility]   05/02/2023

## 2023-05-02 NOTE — PT/OT/SLP PROGRESS
Physical Therapy Treatment    Patient Name:  Marino Mccrary   MRN:  [id number]    Recommendations:     Discharge Recommendations: nursing facility, skilled  Discharge Equipment Recommendations: other (see comments) (pending progress)  Barriers to discharge: Decreased caregiver support and Impaired mobility    Assessment:     Marino Mccrary is a 63 y.o. male admitted with a medical diagnosis of L AMBER aneurysm s/p coil embolization. He presents with the following impairments/functional limitations: weakness, impaired endurance, impaired functional mobility, gait instability, impaired balance, decreased safety awareness. Patient tolerated PT session well, mobilizing with min-modA for transfers and ambulation. Patient is progressing towards functional PT goals however remains at increased risk for falls. Pt is appropriate for continued acute PT services.     Rehab Prognosis: Fair; patient would benefit from acute skilled PT services to address these deficits and reach maximum level of function.    Recent Surgery: * No procedures listed * 6 Days Post-Op    Plan:     During this hospitalization, patient to be seen 6 x/week to address the identified rehab impairments via gait training, therapeutic activities, therapeutic exercises, neuromuscular re-education and progress toward the following goals:    Plan of Care Expires:  05/31/23    Subjective     Chief Complaint: none stated  Patient/Family Comments/goals: to get stronger  Pain/Comfort:  Pain Rating 1: 0/10      Objective:     Communicated with RN prior to session.  Patient found HOB elevated with bed alarm, NG tube, peripheral IV, pulse ox (continuous), telemetry upon PT entry to room.     General Precautions: Standard, fall  Orthopedic Precautions: N/A  Braces: N/A  Respiratory Status: Room air  Blood Pressure: 121/73, HR: 72   Skin Integrity: Visible skin intact    Therapeutic Activities/Exercises:  Patient tolerated PT session well. Patient performed supine to  sit with modA, required CGA for static sitting balance. Patient required modA for sit<>stand with posterior lean initially in standing. Patient ambulated approx. 100ft with Nicole and RW, assist required for control of RW, cues for foot placement. Pt required Nicole for return to supine w/ HOB elevated at conclusion of session.     Education:  Patient provided with verbal education regarding PT POC.  Understanding was verbalized.     Patient left HOB elevated with all lines intact, call button in reach, and CNA present.    GOALS:   Multidisciplinary Problems       Physical Therapy Goals          Problem: Physical Therapy    Goal Priority Disciplines Outcome Goal Variances Interventions   Physical Therapy Goal     PT, PT/OT Ongoing, Progressing     Description: Goals to be met by: 23     Patient will increase functional independence with mobility by performin. Supine to sit with Stand-by Assistance  2. Sit to supine with Stand-by Assistance  3. Sit to stand transfer with Stand-by Assistance  4. Gait  x 150 feet with Stand-by Assistance using No Assistive Device.                          Time Tracking:     PT Received On: 23  PT Start Time: 0946     PT Stop Time: 1003  PT Total Time (min): 17 min     Billable Minutes: Therapeutic Activity 17min    Treatment Type: Treatment  PT/PTA: PT     Number of PTA visits since last PT visit: 4     2023

## 2023-05-02 NOTE — PT/OT/SLP PROGRESS
Speech Language Pathology Department  Dysphagia Therapy Progress Note    Patient Name:  Marino Mccrary   MRN:  [id number]  Admitting Diagnosis: unruptured cerebral aneurysm s/p coiling    Recommendations:     General recommendations:  dysphagia therapy  Diet recommendations:  NPO, Liquid Diet Level: NPO     Discharge recommendations:  nursing facility, skilled   Barriers to safe discharge:  level of skilled assistance needed    Subjective     Patient awake, alert, and cooperative.    Pain/Comfort: Pain Rating 1: 0/10    Spiritual/Cultural/Alevism Beliefs/Practices that affect care: no    Respiratory Status: room air    Objective:     Oral Musculature  Dentition: edentulous  Secretion Management: problems swallowing saliva  Mucosal Quality: good  Facial Movement: WFL  Vocal Quality: strained/strangled  Volitional Cough: Unable to clear secretions    Therapeutic Activities:  Pt completed base of tongue and laryngeal x60 with moderate cues.  Pt tolerated thermal stimulation to the anterior faucial pillars x10 with 60% swallow responses.  Laryngeal excursion fair.  Delay varied 3-10+ seconds.    Assessment:     Pt continues to present with oropharyngeal dysphagia and remains unsafe for PO intake.    Goals:     Multidisciplinary Problems       SLP Goals          Problem: SLP    Goal Priority Disciplines Outcome   SLP Goal     SLP Ongoing, Progressing   Description:   LTG: Tolerate least restrictive PO diet with no clinical signs/sx aspiration  STGs:  1.  Complete base of tongue and laryngeal strengthening exercises with minimal cues  2.  Tolerate thermal stimulation to the anterior faucial pillars with 100% effortful swallow responses and delay less than 2 seconds.                       Patient Education:     Patient provided with verbal education regarding swallow function, risks of aspiration, and SLP POC.  Understanding was verbalized, however additional teaching warranted.    Plan:     Will continue to follow and  tx as appropriate.    SLP Follow-Up:  Yes   Patient to be seen:  daily   Plan of Care expires:  05/05/23  Plan of Care reviewed with:  patient       Time Tracking:     SLP Treatment Date:   05/02/23  Speech Start Time:  1305  Speech Stop Time:  1320     Speech Total Time (min):  15 min    Billable minutes:  Treatment of Swallow Dysfunction, 15 minutes        05/02/2023

## 2023-05-02 NOTE — PT/OT/SLP PROGRESS
Occupational Therapy   Treatment    Name: Marino Mccrary  MRN: [id number]  Admitting Diagnosis:  <principal problem not specified>  6 Days Post-Op    Recommendations:     Discharge Recommendations: nursing facility, skilled  Discharge Equipment Recommendations:  walker, rolling  Barriers to discharge:       Assessment:     Marino Mccrary is a 63 y.o. male with a medical diagnosis of <principal problem not specified>.  He presents with decreased safety awareness. Performance deficits affecting function are weakness, gait instability, decreased upper extremity function, decreased lower extremity function, impaired balance, impaired endurance, decreased safety awareness, impaired self care skills, impaired functional mobility.     Rehab Prognosis:  Good; patient would benefit from acute skilled OT services to address these deficits and reach maximum level of function.       Plan:     Patient to be seen 5 x/week to address the above listed problems via self-care/home management, therapeutic activities, therapeutic exercises  Plan of Care Expires: 05/10/23  Plan of Care Reviewed with: patient    Subjective     Pain/Comfort:  No complaints of pain    Objective:     Communicated with: nurse prior to session.  Patient found HOB elevated with pulse ox (continuous), telemetry upon OT entry to room.    General Precautions: Standard, aspiration    Orthopedic Precautions:N/A  Braces: N/A  Respiratory Status: Room air     Occupational Performance:     Bed Mobility:    Rolling and supine to sit with Min A     Functional Mobility/Transfers:  Sit to stand with Min A with RW, transfer bed to chair with Min A  Functional Mobility: Stand step transfer with RW with Min A    Activities of Daily Living:  Patient attempted to take self to bathroom independently, poor safety awareness, found tangled in feeding tube holding onto iv pole    AMPAC 6 Click ADL: 17    Patient Education:  Patient provided with verbal and demonstration  education regarding safety awareness.  Understanding was verbalized, however additional teaching warranted.      Patient left up in chair with all lines intact, call button in reach, and chair alarm on    GOALS:   Multidisciplinary Problems       Occupational Therapy Goals          Problem: Occupational Therapy    Goal Priority Disciplines Outcome Interventions   Occupational Therapy Goal     OT, PT/OT Ongoing, Progressing    Description: Goals to be met by: Discharge     Patient will increase functional independence with ADLs by performing:    LE Dressing with Modified Grand.  Grooming while standing at sink with Modified Grand.  Toileting from toilet with Modified Grand for hygiene and clothing management.   Bathing from  shower chair/bench with Modified Grand.  Toilet transfer to toilet with Modified Grand.                         Time Tracking:     OT Date of Treatment: 05/02/23  OT Start Time: 1017  OT Stop Time: 1048  OT Total Time (min): 31 min    Billable Minutes:Therapeutic Activity 31    OT/EDVIN: EDVIN     Number of EDVIN visits since last OT visit: 2    5/2/2023

## 2023-05-03 PROCEDURE — 25000003 PHARM REV CODE 250: Performed by: STUDENT IN AN ORGANIZED HEALTH CARE EDUCATION/TRAINING PROGRAM

## 2023-05-03 PROCEDURE — 97530 THERAPEUTIC ACTIVITIES: CPT | Mod: CQ

## 2023-05-03 PROCEDURE — 25000003 PHARM REV CODE 250: Performed by: INTERNAL MEDICINE

## 2023-05-03 PROCEDURE — 97535 SELF CARE MNGMENT TRAINING: CPT | Mod: CO

## 2023-05-03 PROCEDURE — S4991 NICOTINE PATCH NONLEGEND: HCPCS | Performed by: STUDENT IN AN ORGANIZED HEALTH CARE EDUCATION/TRAINING PROGRAM

## 2023-05-03 PROCEDURE — 11000001 HC ACUTE MED/SURG PRIVATE ROOM

## 2023-05-03 PROCEDURE — 92611 MOTION FLUOROSCOPY/SWALLOW: CPT

## 2023-05-03 PROCEDURE — 97116 GAIT TRAINING THERAPY: CPT | Mod: CQ

## 2023-05-03 PROCEDURE — 25000003 PHARM REV CODE 250: Performed by: NURSE PRACTITIONER

## 2023-05-03 PROCEDURE — 25000003 PHARM REV CODE 250: Performed by: ANESTHESIOLOGY

## 2023-05-03 RX ORDER — CLOPIDOGREL BISULFATE 75 MG/1
75 TABLET ORAL DAILY
Status: DISCONTINUED | OUTPATIENT
Start: 2023-05-03 | End: 2023-05-04

## 2023-05-03 RX ORDER — METOPROLOL TARTRATE 25 MG/1
25 TABLET, FILM COATED ORAL 2 TIMES DAILY
Status: DISCONTINUED | OUTPATIENT
Start: 2023-05-03 | End: 2023-05-04

## 2023-05-03 RX ORDER — NAPROXEN SODIUM 220 MG/1
81 TABLET, FILM COATED ORAL DAILY
Status: DISCONTINUED | OUTPATIENT
Start: 2023-05-03 | End: 2023-05-04

## 2023-05-03 RX ADMIN — SACUBITRIL AND VALSARTAN 1 TABLET: 24; 26 TABLET, FILM COATED ORAL at 09:05

## 2023-05-03 RX ADMIN — METOPROLOL TARTRATE 25 MG: 25 TABLET, FILM COATED ORAL at 09:05

## 2023-05-03 RX ADMIN — CLOPIDOGREL BISULFATE 75 MG: 75 TABLET ORAL at 09:05

## 2023-05-03 RX ADMIN — NICOTINE 1 PATCH: 21 PATCH, EXTENDED RELEASE TRANSDERMAL at 09:05

## 2023-05-03 RX ADMIN — ACETAMINOPHEN 650 MG: 325 TABLET ORAL at 09:05

## 2023-05-03 RX ADMIN — ASPIRIN 81 MG 81 MG: 81 TABLET ORAL at 09:05

## 2023-05-03 RX ADMIN — ATORVASTATIN CALCIUM 10 MG: 10 TABLET, FILM COATED ORAL at 09:05

## 2023-05-03 RX ADMIN — POLYETHYLENE GLYCOL 3350 17 G: 17 POWDER, FOR SOLUTION ORAL at 09:05

## 2023-05-03 NOTE — PROGRESS NOTES
[facility] Medicine Progress Note        Chief Complaint: Inpatient Follow-up    HPI:   Mr Mccrary is a 63 y.o. 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 King.  Patient was admitted to ICU postprocedure for monitoring.  He had a delayed recovery from anesthesia.  Patient has a history of right MCA stroke with left-sided residual weakness.  At some point while he was still under the effect of anesthesia, he was unable to move his right lower extremity which has since resolved.  Patient still have weakness in the left upper extremity, which per ICU nurse, his sister confirmed that it is residual from his previous stroke.  Sister reported that patient lives alone but he is pretty functional given he cooks for himself, does his grocery shopping, drives and mows his yard as well.    During ICU stay, overnight, had some confusion, CT head repeated was negative.  Patient did receive IV Ativan.    Patient speech is also dysarthric from his previous stroke.  Sister confirmed that every time patient eat or drink something he coughs. That made as concern for swallow related issues and concern for aspiration as well.  Speech was consulted; following recs. Physical therapy and occupational therapy also consulted; will follow recs for DC planning. Noted to be somewhat confused but able to answer most questions. Nurse stating that this is patient's baseline. Patient is still has NG tube and is receiving TF.     Interval Hx:   Laying in bed.  Denied any complaints.  Discussed that he failed MBS and the need for PEG tube to go to rehab.  Patient has agreed  No family is at bedside, I did call his  sister Tisha and gave her the update.  She is also agreeing with the PEG tube placement  Case discussed with patient's nurse and  on the floor    Objective/physical exam:  General: In no acute distress, afebrile  Chest:  Coarse breath sounds anteriorly, on room air  Heart: RRR, +S1, S2, no appreciable murmur  Abdomen: NGT in place; Soft, nontender, BS +  MSK: Warm, no lower extremity edema, no clubbing or cyanosis  Neurologic:  Awake and alert and oriented.  Speech has improved since I last saw him on 4/28,  Strength 4/5 in LUE/LLE & 5/5 in RUE/RLE    VITAL SIGNS: 24 HRS MIN & MAX LAST   Temp  Min: 37.4 °F (3 °C)  Max: 99.1 °F (37.3 °C) (!) 37.4 °F (3 °C)   BP  Min: 96/66  Max: 131/85 120/74   Pulse  Min: 63  Max: 74  74   Resp  Min: 16  Max: 18 18   SpO2  Min: 94 %  Max: 97 % 97 %     Recent Labs   Lab 04/29/23  0757 04/30/23  0121 05/02/23  0459   WBC 10.1 9.1 9.82   RBC 5.41 5.22 5.28   HGB 15.9 15.1 15.4   HCT 47.9 44.9 45.8   MCV 88.5 86.0 86.7   MCH 29.4 28.9 29.2   MCHC 33.2 33.6 33.6   RDW 15.0 14.5 14.6    175 237   MPV 10.9* 10.9* 10.5*       Recent Labs   Lab 04/29/23  0757 04/30/23  0121 05/02/23  0459    137 136   K 4.3 4.1 4.1   CO2 24 19* 20*   BUN 15.1 19.5 29.6*   CREATININE 0.81 0.71* 0.83   CALCIUM 9.3 9.1 9.2   MG 2.00 1.90 2.10   ALBUMIN 3.9 3.6 3.4   ALKPHOS 67 65 61   ALT 30 26 18   AST 32 26 18   BILITOT 1.6* 1.4 1.1       Assessment/Plan:  s/p Coil Embolization of L AMBER Aneurysm  Prior R MCA CVA with Residual LUE/LLE Weakness, L Facial Droop  HTN  Nicotine dependence with current smoking status      Admitted to ICU on 04/26/2023, downgraded to hospital medicine team on 04/28/2023  Noted to be at baseline in terms of mental status; noted to be somewhat confused but able to answer questions  Reporting no complaints presently   PT/OT on board--> recommended sniff  ST on board; performed MBS which patient failed again today.  I spoke to patient and his sister both and  they both are agreeing for PEG tube given that is requirement to go to rehab as they can not accept the patient with NG tube  GI consulted for PEG tube , patient will have to be off of Plavix for 5 days so tentatively plan for PEG tube placement on Monday.  Will reach out to Neurology team for recommendation regarding the request for holding Plavix for PEG tube placement  Patient continued on ASA, Atorvastatin, Plavix, Metoprolol Tartrate, Entresto  Continue monitoring patient's symptoms  Morning CBC, CMP.  INR ordered     VTE prophylaxis: SCDs    Patient condition:  Stable    Anticipated discharge and Disposition:   SNF once medically ready, probable PEG tube       All diagnosis and differential diagnosis have been reviewed; assessment and plan has been documented; I have personally reviewed the labs and test results that are presently available; I have reviewed the patients medication list; I have reviewed the consulting providers response and recommendations. I have reviewed or attempted to review medical records based upon their availability    All of the patient's questions have been  addressed and answered. Patient's is agreeable to the above stated plan. I will continue to monitor closely and make adjustments to medical management as needed.  _____________________________________________________________________    Nutrition Status: TF    Radiology:  X-Ray Chest 1 View  EXAMINATION  XR CHEST 1 VIEW    CLINICAL HISTORY  Shortness of breath;    TECHNIQUE  A total of 1 frontal image(s) submitted of the chest.    COMPARISON  7 January 2023    FINDINGS  Lines/tubes/devices: Esophagogastric tube is now visualized, extending the length of the esophagus and terminating beyond the lower image margin.  Left chest wall single lead ICD is unchanged.  ECG leads overlie the chest.    The cardiac silhouette and central vascular structures are unchanged.  The trachea is midline. No new or worsening consolidation is developed  in the interval.  There is no large pleural effusion or convincing pneumothorax.    Regional osseous structures and extrathoracic soft tissues are similar.    IMPRESSION  1. No acute process or other adverse interval change.  2. Esophagogastric tube terminates inferior to the level of diaphragm.    Electronically signed by: Brandon Constantino  Date:    04/29/2023  Time:    09:49      Kobe Antoine MD  Department of Hospital Medicine   [facility]   05/03/2023

## 2023-05-03 NOTE — PLAN OF CARE
Problem: SLP  Goal: SLP Goal  Description:   LTG: Tolerate least restrictive PO diet with no clinical signs/sx aspiration - progressing  STGs:  1.  Complete base of tongue and laryngeal strengthening exercises with minimal cues - progressing  2.  Tolerate thermal stimulation to the anterior faucial pillars with 100% effortful swallow responses and delay less than 2 seconds - progressing    Outcome: Ongoing, Progressing

## 2023-05-03 NOTE — CONSULTS
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   GI consulted for oropharyngeal dysphagia and PEG tube eval.     Initial MBS on 04/28 exhibited moderate-severe oral and mild-moderate pharyngeal dysphagia resulting in poor airway protection with aspiration of thin liquids. Repeat MBS today with moderate-severe oral and moderate-severe pharyngeal dysphagia resulting in aspiration from the pyriform sinuses with all consistencies given.  Cough response inconsistently present and NOT productive for clearing contrast material from the trachea/laryngeal vestibule.    On exam, patient with no signs of acute distress and agreeable to PEG tube placement. He did have a prior PEG tube following previous stroke, states to have used for multiple months but unsure of exact timeline- attempted to call sister for collateral information with no answer.   He is on Plavix, last dose given this morning @0900  He has been receiving nutrition and meds via NG tube, however nurse at bedside upon entering the room as it was just pulled out.      ROS:  Review of Systems   Constitutional:  Negative for chills and fever.   HENT:  Negative for sore throat.    Respiratory:  Negative for shortness of breath, wheezing and stridor.    Gastrointestinal:  Negative for abdominal pain, nausea and vomiting.   Skin:  Negative for itching and rash.   Neurological:  Negative for loss of consciousness.   Psychiatric/Behavioral:  The patient is not nervous/anxious.      Medical History:   Past Medical History:   Diagnosis Date    Acid reflux     Cerebral  aneurysm     Heart disease     Hypertension     Mixed hyperlipidemia        Surgical History:   Past Surgical History:   Procedure Laterality Date    BACK SURGERY      CARDIAC SURGERY      INSERT / REPLACE / REMOVE PACEMAKER Left     KNEE SURGERY         Family History:   Family History   Family history unknown: Yes   .     Social History:   Social History     Tobacco Use    Smoking status: Every Day     Packs/day: 1.00     Types: Cigarettes    Smokeless tobacco: Never   Substance Use Topics    Alcohol use: Yes     Comment: 1/2 pint of whiskey every month       Allergies:  Review of patient's allergies indicates:  No Known Allergies    Medications Prior to Admission   Medication Sig Dispense Refill Last Dose    aspirin (ECOTRIN) 81 MG EC tablet Take 81 mg by mouth once daily.   4/26/2023 at 0630    atorvastatin (LIPITOR) 10 MG tablet Take 10 mg by mouth once daily.   4/25/2023 at 2100    clopidogreL (PLAVIX) 75 mg tablet Take 75 mg by mouth once daily.   4/26/2023 at 0630    metoprolol succinate (TOPROL-XL) 100 MG 24 hr tablet Take 100 mg by mouth.   4/26/2023    QUEtiapine (SEROQUEL) 100 MG Tab Take 100 mg by mouth nightly.   4/25/2023 at 2100    sacubitriL-valsartan (ENTRESTO) 24-26 mg per tablet Take 1 tablet by mouth 2 (two) times daily.   4/26/2023 at 0630    spironolactone (ALDACTONE) 25 MG tablet Take 25 mg by mouth once daily.   4/26/2023 at 0630    zolpidem (AMBIEN) 5 MG Tab Take 5 mg by mouth nightly as needed.   4/25/2023 at 2100    pantoprazole (PROTONIX) 40 MG tablet Take 1 tablet (40 mg total) by mouth once daily. 60 tablet 0     scopolamine (TRANSDERM-SCOP) 1.3-1.5 mg (1 mg over 3 days) Place 1 patch onto the skin every 72 hours.   4/24/2023 at 0900    [DISCONTINUED] amitriptyline (ELAVIL) 50 MG tablet Take 25 mg by mouth every evening.       [DISCONTINUED] clindamycin (CLEOCIN) 150 MG capsule SMARTSIG:3 Capsule(s) By Mouth Every 8 Hours       [DISCONTINUED] fluticasone propionate (FLONASE) 50  "mcg/actuation nasal spray 1 spray by Each Nostril route.            Objective Findings:    Vital Signs:  BP (!) 144/78   Pulse 67   Temp 98 °F (36.7 °C) (Oral)   Resp 18   Ht 5' 8" (1.727 m)   Wt 74.8 kg (164 lb 14.5 oz)   SpO2 97%   BMI 25.07 kg/m²   Body mass index is 25.07 kg/m².    Physical Exam:  Physical Exam  Constitutional:       General: He is not in acute distress.     Appearance: Normal appearance.   HENT:      Head: Normocephalic.      Nose: Nose normal.   Eyes:      Extraocular Movements: Extraocular movements intact.   Cardiovascular:      Pulses: Normal pulses.   Pulmonary:      Effort: Pulmonary effort is normal. No respiratory distress.      Breath sounds: No stridor.   Abdominal:      General: Abdomen is flat. Bowel sounds are normal. There is no distension.      Palpations: Abdomen is soft.      Tenderness: There is no abdominal tenderness. There is no guarding.      Comments: Well healed scar from previous PEG noted in LUQ. Well healed vertical scar down mid abdomen s/p cardiac surgery    Skin:     General: Skin is warm and dry.   Neurological:      Mental Status: He is alert.   Psychiatric:         Behavior: Behavior normal.       Labs:  No results found for this or any previous visit (from the past 24 hour(s)).    Fl Modified Barium Swallow Speech   Final Result      X-Ray Chest 1 View   Final Result      Fl Modified Barium Swallow Speech   Final Result      X-Ray Abdomen AP 1 View   Final Result      Nasogastric tube as above         Electronically signed by: Tomas Hollins   Date:    04/28/2023   Time:    10:12      CT Head Without Contrast   Final Result      Stable chronic findings seen.  No acute process         Electronically signed by: Sierra Perdomo   Date:    04/27/2023   Time:    13:11      X-Ray Abdomen AP 1 View   Final Result      Standard NG tube placement         Electronically signed by: Félix Kinsey MD   Date:    04/27/2023   Time:    11:50      CT Head Without " Contrast   Final Result      1.  No acute intracranial findings identified      2.  Large old infarcts, chronic microvascular ischemia and atrophy.         Electronically signed by: Isaac Cheng   Date:    04/26/2023   Time:    19:02      IR Embolization (CNS) Intracranial   Final Result      Fluoroscopic assistance provided as above.         Electronically signed by: Nader Jain   Date:    04/26/2023   Time:    16:29      CT Head Without Contrast   Final Result      Stable appearing aneurysm in the anterior cerebral artery on the left side.      Stable old areas of encephalomalacia in the MCA distributions bilaterally      Chronic age related changes         Electronically signed by: Sierra Perdomo   Date:    04/26/2023   Time:    13:48          Assessment/Plan:  63 year old male unknown to our group with Hx right MCA stroke with residual left-sided weakness, facial droop, coronary artery disease, hypertension who is s/p elective coil embolization of left AMBER aneurysm who was initially admitted to ICU for monitoring due to delayed recovery from anesthesia.   GI consulted for oropharyngeal dysphagia and PEG tube eval.     Oropharyngeal dysphagia  - will plan for PEG placement tomorrow with CBC and INR lab draw prior to EGD in the morning   - hold plavix morning of   2. S/p coli embolization of AMBER aneurysm  - on Heparin, last dose this morning   3. HTN    Thank you for allowing us to participate in the care of Marino Mccrary.    LON WillC  Gastroenterology  [facility]

## 2023-05-03 NOTE — PT/OT/SLP PROGRESS
Physical Therapy Treatment    Patient Name:  Marino Mccrary   MRN:  [id number]    Recommendations:     Discharge Recommendations: rehabilitation facility  Discharge Equipment Recommendations: other (see comments) (pending progress)  Barriers to discharge:  Acuity of illness    Assessment:     Marino Mccrary is a 63 y.o. male admitted with a medical diagnosis of L AMBER aneurysm s/p coil embolization.  He presents with the following impairments/functional limitations: weakness, impaired endurance, impaired balance, gait instability.    Rehab Prognosis: Good; patient would benefit from acute skilled PT services to address these deficits and reach maximum level of function.    Recent Surgery: * No procedures listed * 7 Days Post-Op    Plan:     During this hospitalization, patient to be seen 6 x/week to address the identified rehab impairments via gait training, therapeutic activities, therapeutic exercises and progress toward the following goals:    Plan of Care Expires:  05/31/23    Subjective     Chief Complaint: None  Patient/Family Comments/goals: None  Pain/Comfort:  0/10      Objective:     Pt found in bed upon entry.      General Precautions: Standard, fall  Orthopedic Precautions: N/A  Braces: N/A  Respiratory Status: Room air  Blood Pressure: 125/85, 57 bpm    Functional Mobility:  Bed Mobility:     Supine to Sit: Minimum assistance  Transitional Sit-to-stand: Min A with RW  Gait: Min a with RW and HHA  Pt amb 100ft with RW and 100ft with HHA. Pt presents with R lateral lean and excursion, which improved with increased in gait distance. VC's for posture and for increased step length.     Therapeutic Activities/Exercises:  Balance/Weight shifting/Strengthening  Pt performed forward step ups on 4inch box 2 x 10 reps with each LE with 2# on each ankle.   Pt performed L and R lateral step ups on 4inch box 2 X 10 reps with each LE and 2# on each ankle.   Strengthening/weight bearing  L foot placed further back  than R for increased wb during sit-to-stand. Pt assisted with reach back and push off with LUE. Pt performed 2 X 5 reps from bedside chair.   Balance/Strengthening  Pt performed backwards amb 2 X 40ft with HHA and 2# on each LE.     Patient left up in chair with call button in reach and chair alarm on..    GOALS:   Multidisciplinary Problems       Physical Therapy Goals          Problem: Physical Therapy    Goal Priority Disciplines Outcome Goal Variances Interventions   Physical Therapy Goal     PT, PT/OT Ongoing, Progressing     Description: Goals to be met by: 23     Patient will increase functional independence with mobility by performin. Supine to sit with Stand-by Assistance  2. Sit to supine with Stand-by Assistance  3. Sit to stand transfer with Stand-by Assistance  4. Gait  x 150 feet with Stand-by Assistance using No Assistive Device.                          Time Tracking:     PT Received On: 23  PT Start Time: 824     PT Stop Time: 903  PT Total Time (min): 39 min     Billable Minutes: Gait Training 10 and Therapeutic Activity 29    Treatment Type: Treatment  PT/PTA: PTA     Number of PTA visits since last PT visit: 2023

## 2023-05-03 NOTE — PROCEDURES
Speech Language Pathology Department  Modified Barium Swallow Study    Patient Name:  Marino Mccrary   MRN:  [id number]    Recommendations:     General recommendations:  dysphagia therapy and consider long term alternate means of nutrition  Repeat MBS study: 2-3 weeks  Diet recommendations:  NPO  General precautions: Standard, aspiration    History/Reason for Referral:     Marino Mccrary is a/n 63 y.o. male admitted s/p embolization of LACA Aneurysm.  Pt's sister reported hx of multiple strokes. And cough with PO intake at home.    Initial MBS study was completed on 4/28 at which time pt exhibited moderate-severe oral and mild-moderate pharyngeal dysphagia resulting in poor airway protection with aspiration of thin liquids.    Past Medical History:   Diagnosis Date    Acid reflux     Cerebral aneurysm     Heart disease     Hypertension     Mixed hyperlipidemia      Past Surgical History:   Procedure Laterality Date    BACK SURGERY      CARDIAC SURGERY      INSERT / REPLACE / REMOVE PACEMAKER Left     KNEE SURGERY       A Modified Barium Swallow Study was repeated to assess the efficiency of his/her swallow function, rule out aspiration and make recommendations regarding safe dietary consistencies, effective compensatory strategies, and safe eating environment.     Home Diet: Regular and thin liquids  Current Method of Nutrition: Tube feeding (NG)    Subjective:     Patient awake, alert, and flat.    Spiritual/Cultural/[employer] Beliefs/Practices that affect care: no  Pain/Comfort: Pain Rating 1: 0/10    Respiratory Status: room air    Fluoroscopic Results:     Oral Musculature  Dentition: edentulous  Secretion Management: coughing on secretions  Mucosal Quality: good  Facial Movement: general weakness  Buccal Strength & Mobility: WFL  Mandibular Strength & Mobility: WFL  Oral Labial Strength & Mobility: impaired seal  Lingual Strength & Mobility: impaired strength  Velar Elevation: WFL  Vocal Quality:  strained/strangled    Visualization  Patient was seen in the lateral view    Oral Phase:   Reduced lip closure  Anterior spillage  Disorganized lingual movement  Cues required to initiate anterior-posterior transport  Tongue pumping  Poor bolus cohesion  Loss of bolus control with all consistencies    Pharyngeal Phase:   Swallow delay with spill to the pyriform sinuses  Reduced base of tongue retraction  Adequate epiglottic deflection  Reduced hyolaryngeal excursion  Reduced airway protection  Mild base of tongue residue with puree  Laryngeal pumping  Consistency Laryngeal Penetration Aspiration   Mildly thick liquid by spoon After the swallow During the swallow  Cough response present/NOT productive   Puree After the swallow After the swallow  SILENT  Delayed cough NOT productive     Cervical Esophageal Phase:   UES appeared to accommodate all bolus types without stasis or retrograde movement visualized    Compensatory Strategies:  Compensatory strategies were not attempted due to cognitive deficits    Additional Comments:  Additional consistencies were not tested due to severity of impairment    Assessment:     Pt exhibited moderate-severe oral and moderate-severe pharyngeal dysphagia resulting in aspiration from the pyriform sinuses with all consistencies given.  Cough response inconsistently present and NOT productive for clearing contrast material from the trachea/laryngeal vestibule.    Oral Phase: Lip closure was reduced with  anterior spillage .  Bolus preparation and mastication were prolonged with poor bolus cohesion.  Bolus transport delayed due to bolus holding.  There was no significant oral residue.  Bolus control was poor.    Pharyngeal Phase: The swallow was initiated after the bolus entered the pyriform sinuses  The soft palate elevated for adequate closure of the velopharyngeal port.  Tongue base retraction was reduced.  Epiglottic deflection appeared to be complete.  Hyolaryngeal excursion was  reduced.   An additional swallow to clear mild base of tongue residue could not be elicited.   Airway protection  was poor .    Goals:     Multidisciplinary Problems       SLP Goals          Problem: SLP    Goal Priority Disciplines Outcome   SLP Goal     SLP Ongoing, Progressing   Description:   LTG: Tolerate least restrictive PO diet with no clinical signs/sx aspiration - progressing  STGs:  1.  Complete base of tongue and laryngeal strengthening exercises with minimal cues - progressing  2.  Tolerate thermal stimulation to the anterior faucial pillars with 100% effortful swallow responses and delay less than 2 seconds - progressing                       Patient Education:     Patient provided with verbal education regarding study results and risks of aspiration.  Understanding was verbalized, however additional teaching warranted.    Plan:     SLP Follow-Up:  Yes    Patient to be seen:  daily   Plan of Care expires:  05/17/23  Plan of Care reviewed with:  patient     Time Tracking:     SLP Treatment Date:   05/03/23  Speech Start Time:  0945  Speech Stop Time:  1005     Speech Total Time (min):  20 min    Billable minutes:   Motion Fluoroscopic Evaluation, Video Recording, 20 minutes      05/03/2023

## 2023-05-03 NOTE — PT/OT/SLP PROGRESS
Occupational Therapy   Treatment    Name: Marino Mccrary  MRN: [id number]  Admitting Diagnosis:  Unruptured cerebral aneurysm  7 Days Post-Op    Recommendations:     Discharge Recommendations: rehabilitation facility  Discharge Equipment Recommendations:  walker, rolling  Barriers to discharge:       Assessment:     Marino Mccrary is a 63 y.o. male with a medical diagnosis of unruptured cerebral aneurysm. Performance deficits affecting function are weakness, impaired endurance, impaired self care skills, impaired functional mobility, gait instability, decreased safety awareness.     Rehab Prognosis:  Good; patient would benefit from acute skilled OT services to address these deficits and reach maximum level of function.       Plan:     Patient to be seen 5 x/week to address the above listed problems via therapeutic exercises, therapeutic activities, self-care/home management  Plan of Care Expires: 05/10/23  Plan of Care Reviewed with: patient    Subjective     Pain/Comfort:  No c/o pain.      Objective:     Communicated with: RN prior to session.  Patient found supine with bed alarm, pulse ox (continuous), telemetry, NG tube upon OT entry to room.    General Precautions: Standard, aspiration, NPO    Orthopedic Precautions:N/A  Braces: N/A  Respiratory Status: Room air  Vital Signs: Blood Pressure: 163/90     Occupational Performance:     Bed Mobility:    Patient completed Scooting/Bridging with minimum assistance  Patient completed Supine to Sit with minimum assistance  Patient completed Sit to Supine with minimum assistance     Functional Mobility/Transfers:  Patient completed Sit <> Stand Transfer with minimum assistance  with  rolling walker   Functional Mobility: Min A with RW to and from bathroom. Slightly unsteady but no LOB noted.     Activities of Daily Living:  Toileting: performed toilet t/f with Min A for descent to low surface and cues for safety. Did not feel urge to void.   Grooming: completed hand  hygiene standing at sink with CGA for balance.   UE dressing: doffed back gown seated EOB with SPV.     Therapeutic Positioning    OT interventions performed during the course of today's session in an effort to prevent and/or reduce acquired pressure injuries:   Education on Pressure Ulcer Prevention provided    Skin assessment: all visible skin assessed   Findings: no redness or breakdown noted    [facility] 6 Click ADL: 19    Patient Education:  Patient provided with verbal education regarding OT role/goals/POC, fall prevention, and safety awareness.  Understanding was verbalized, however additional teaching warranted.      Patient left supine with all lines intact, call button in reach, and bed alarm on    GOALS:   Multidisciplinary Problems       Occupational Therapy Goals          Problem: Occupational Therapy    Goal Priority Disciplines Outcome Interventions   Occupational Therapy Goal     OT, PT/OT Ongoing, Progressing    Description: Goals to be met by: Discharge     Patient will increase functional independence with ADLs by performing:    LE Dressing with Modified Melissa.  Grooming while standing at sink with Modified Melissa.  Toileting from toilet with Modified Melissa for hygiene and clothing management.   Bathing from  shower chair/bench with Modified Melissa.  Toilet transfer to toilet with Modified Melissa.                         Time Tracking:     OT Date of Treatment: 05/03/23  OT Start Time: 1355  OT Stop Time: 1418  OT Total Time (min): 23 min    Billable Minutes:Self Care/Home Management 23    OT/EDVIN: EDVIN     Number of EDVIN visits since last OT visit: 3    5/3/2023

## 2023-05-04 LAB
ALBUMIN SERPL-MCNC: 3.1 G/DL (ref 3.4–4.8)
ALBUMIN/GLOB SERPL: 0.8 RATIO (ref 1.1–2)
ALP SERPL-CCNC: 68 UNIT/L (ref 40–150)
ALT SERPL-CCNC: 19 UNIT/L (ref 0–55)
AST SERPL-CCNC: 19 UNIT/L (ref 5–34)
BILIRUBIN DIRECT+TOT PNL SERPL-MCNC: 0.7 MG/DL
BUN SERPL-MCNC: 19.6 MG/DL (ref 8.4–25.7)
CALCIUM SERPL-MCNC: 9.3 MG/DL (ref 8.8–10)
CHLORIDE SERPL-SCNC: 105 MMOL/L (ref 98–107)
CO2 SERPL-SCNC: 25 MMOL/L (ref 23–31)
CREAT SERPL-MCNC: 0.72 MG/DL (ref 0.73–1.18)
ERYTHROCYTE [DISTWIDTH] IN BLOOD BY AUTOMATED COUNT: 14.4 % (ref 11.5–17)
GFR SERPLBLD CREATININE-BSD FMLA CKD-EPI: >60 MLS/MIN/1.73/M2
GLOBULIN SER-MCNC: 3.7 GM/DL (ref 2.4–3.5)
GLUCOSE SERPL-MCNC: 126 MG/DL (ref 82–115)
HCT VFR BLD AUTO: 44.3 % (ref 42–52)
HGB BLD-MCNC: 14.7 G/DL (ref 14–18)
INR BLD: 0.89 (ref 0–1.3)
MCH RBC QN AUTO: 29.2 PG (ref 27–31)
MCHC RBC AUTO-ENTMCNC: 33.2 G/DL (ref 33–36)
MCV RBC AUTO: 88.1 FL (ref 80–94)
NRBC BLD AUTO-RTO: 0 %
PLATELET # BLD AUTO: 275 X10(3)/MCL (ref 130–400)
PMV BLD AUTO: 10.2 FL (ref 7.4–10.4)
POTASSIUM SERPL-SCNC: 4.4 MMOL/L (ref 3.5–5.1)
PROT SERPL-MCNC: 6.8 GM/DL (ref 5.8–7.6)
PROTHROMBIN TIME: 12 SECONDS (ref 12.5–14.5)
RBC # BLD AUTO: 5.03 X10(6)/MCL (ref 4.7–6.1)
SODIUM SERPL-SCNC: 137 MMOL/L (ref 136–145)
WBC # SPEC AUTO: 8.47 X10(3)/MCL (ref 4.5–11.5)

## 2023-05-04 PROCEDURE — 25000003 PHARM REV CODE 250: Performed by: INTERNAL MEDICINE

## 2023-05-04 PROCEDURE — 97164 PT RE-EVAL EST PLAN CARE: CPT

## 2023-05-04 PROCEDURE — 25000003 PHARM REV CODE 250: Performed by: STUDENT IN AN ORGANIZED HEALTH CARE EDUCATION/TRAINING PROGRAM

## 2023-05-04 PROCEDURE — 25000003 PHARM REV CODE 250: Performed by: NURSE PRACTITIONER

## 2023-05-04 PROCEDURE — 85027 COMPLETE CBC AUTOMATED: CPT | Performed by: INTERNAL MEDICINE

## 2023-05-04 PROCEDURE — 97530 THERAPEUTIC ACTIVITIES: CPT | Mod: CO

## 2023-05-04 PROCEDURE — S4991 NICOTINE PATCH NONLEGEND: HCPCS | Performed by: STUDENT IN AN ORGANIZED HEALTH CARE EDUCATION/TRAINING PROGRAM

## 2023-05-04 PROCEDURE — 85610 PROTHROMBIN TIME: CPT | Performed by: INTERNAL MEDICINE

## 2023-05-04 PROCEDURE — 80053 COMPREHEN METABOLIC PANEL: CPT | Performed by: INTERNAL MEDICINE

## 2023-05-04 PROCEDURE — 97535 SELF CARE MNGMENT TRAINING: CPT | Mod: CO

## 2023-05-04 PROCEDURE — 92526 ORAL FUNCTION THERAPY: CPT

## 2023-05-04 PROCEDURE — 11000001 HC ACUTE MED/SURG PRIVATE ROOM

## 2023-05-04 PROCEDURE — 25000003 PHARM REV CODE 250: Performed by: ANESTHESIOLOGY

## 2023-05-04 PROCEDURE — 25000003 PHARM REV CODE 250

## 2023-05-04 RX ORDER — METOPROLOL TARTRATE 25 MG/1
12.5 TABLET ORAL 2 TIMES DAILY
Status: DISCONTINUED | OUTPATIENT
Start: 2023-05-04 | End: 2023-05-10

## 2023-05-04 RX ORDER — ASPIRIN 300 MG/1
300 SUPPOSITORY RECTAL DAILY
Status: DISCONTINUED | OUTPATIENT
Start: 2023-05-04 | End: 2023-05-15 | Stop reason: HOSPADM

## 2023-05-04 RX ADMIN — LABETALOL HYDROCHLORIDE 10 MG: 5 INJECTION, SOLUTION INTRAVENOUS at 12:05

## 2023-05-04 RX ADMIN — METOPROLOL TARTRATE 25 MG: 25 TABLET, FILM COATED ORAL at 08:05

## 2023-05-04 RX ADMIN — POLYETHYLENE GLYCOL 3350 17 G: 17 POWDER, FOR SOLUTION ORAL at 08:05

## 2023-05-04 RX ADMIN — ASPIRIN 81 MG 81 MG: 81 TABLET ORAL at 08:05

## 2023-05-04 RX ADMIN — ATORVASTATIN CALCIUM 10 MG: 10 TABLET, FILM COATED ORAL at 08:05

## 2023-05-04 RX ADMIN — NICOTINE 1 PATCH: 21 PATCH, EXTENDED RELEASE TRANSDERMAL at 09:05

## 2023-05-04 RX ADMIN — CLOPIDOGREL BISULFATE 75 MG: 75 TABLET ORAL at 08:05

## 2023-05-04 RX ADMIN — ASPIRIN 300 MG: 300 SUPPOSITORY RECTAL at 04:05

## 2023-05-04 RX ADMIN — SACUBITRIL AND VALSARTAN 1 TABLET: 24; 26 TABLET, FILM COATED ORAL at 08:05

## 2023-05-04 NOTE — PROGRESS NOTES
[facility] Medicine Progress Note      Chief Complaint: Inpatient Follow-up     HPI:   Mr Mccrary is a 63 y.o. 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 King.  Patient was admitted to ICU postprocedure for monitoring.  He had a delayed recovery from anesthesia.  Patient has a history of right MCA stroke with left-sided residual weakness.  At some point while he was still under the effect of anesthesia, he was unable to move his right lower extremity which has since resolved.  Patient still have weakness in the left upper extremity, which per ICU nurse, his sister confirmed that it is residual from his previous stroke.  Sister reported that patient lives alone but he is pretty functional given he cooks for himself, does his grocery shopping, drives and mows his yard as well.    During ICU stay, overnight, had some confusion, CT head repeated was negative.  Patient did receive IV Ativan.    Patient speech is also dysarthric from his previous stroke.  Sister confirmed that every time patient eat or drink something he coughs. That made as concern for swallow related issues and concern for aspiration as well.  Speech was consulted; following recs. Physical therapy and occupational therapy also consulted; will follow recs for DC planning. Noted to be somewhat confused but able to answer most questions. Nurse stating that this is patient's baseline. Patient is still has NG tube and is receiving TF.        Patient currently being managed for right MCA CVA with left-sided deficits, complicated by oropharyngeal dysphagia on NG tube.  Awaiting PEG tube placement but needs to hold Plavix for 5  days.    Interval Hx:   Patient seen and examined at bedside  Patient's face is NG tube fell off while he was sneezing   Neuro stays it is okay to hold Plavix and continue aspirin rectally or via NG tube and resume Plavix immediately after PEG tube has been placed  Labs reviewed and stable   Blood pressure is on the softer side reduce metoprolol to 12.5 mg b.i.d.        Objective/physical exam:  General: In no acute distress, afebrile  Chest:  Coarse breath sounds anteriorly, on room air  Heart: RRR, +S1, S2, no appreciable murmur  Abdomen: NGT in place; Soft, nontender, BS +  MSK: Warm, no lower extremity edema, no clubbing or cyanosis  Neurologic:  Awake and alert and oriented.  Speech has improved since I last saw him on 4/28,  Strength 4/5 in LUE/LLE & 5/5 in RUE/RLE      Assessment/Plan:  Oropharyngeal dysphagia on NG tube  s/p Coil Embolization of L AMBER Aneurysm  Prior R MCA CVA with Residual LUE/LLE Weakness, L Facial Droop  HTN, bp is borderline low   Nicotine dependence with current smoking status        Plan  Neuro stays it is okay to hold Plavix and continue aspirin rectally or via NG tube and resume Plavix immediately after PEG tube has been placed  Hold plavix  reduce metoprolol to 12.5 mg b.i.d.   PT/OT on board--> recommended snf  GI consulted for PEG tube , patient will have to be off of Plavix for 5 days so tentatively plan for PEG tube placement on Monday.    Patient continued on ASA, Atorvastatin,  Metoprolol Tartrate, Entresto       VTE prophylaxis: SCDs     Patient condition:  Stable     Anticipated discharge and Disposition:   SNF once medically ready, probable PEG tube     Tobacco cessation counseling provided, patient is not ready to quit   Tobacco cessation time 5-10 minutes     VITAL SIGNS: 24 HRS MIN & MAX LAST   Temp  Min: 97.8 °F (36.6 °C)  Max: 99.4 °F (37.4 °C) 98.6 °F (37 °C)   BP  Min: 100/71  Max: 158/92 126/84   Pulse  Min: 60  Max: 90  60   Resp  Min: 18  Max: 18 18   SpO2  Min:  93 %  Max: 97 % 96 %       Recent Labs   Lab 04/30/23  0121 05/02/23  0459 05/04/23  0529   WBC 9.1 9.82 8.47   RBC 5.22 5.28 5.03   HGB 15.1 15.4 14.7   HCT 44.9 45.8 44.3   MCV 86.0 86.7 88.1   MCH 28.9 29.2 29.2   MCHC 33.6 33.6 33.2   RDW 14.5 14.6 14.4    237 275   MPV 10.9* 10.5* 10.2       Recent Labs   Lab 04/29/23  0757 04/30/23  0121 05/02/23  0459 05/04/23  0529    137 136 137   K 4.3 4.1 4.1 4.4   CO2 24 19* 20* 25   BUN 15.1 19.5 29.6* 19.6   CREATININE 0.81 0.71* 0.83 0.72*   CALCIUM 9.3 9.1 9.2 9.3   MG 2.00 1.90 2.10  --    ALBUMIN 3.9 3.6 3.4 3.1*   ALKPHOS 67 65 61 68   ALT 30 26 18 19   AST 32 26 18 19   BILITOT 1.6* 1.4 1.1 0.7          Microbiology Results (last 7 days)       ** No results found for the last 168 hours. **             See below for Radiology    Scheduled Med:   aspirin  81 mg Per NG tube Daily    atorvastatin  10 mg Per NG tube Daily    clopidogreL  75 mg Per NG tube Daily    metoprolol tartrate  12.5 mg Per NG tube BID    nicotine  1 patch Transdermal Daily    polyethylene glycol  17 g Per NG tube Daily    sacubitriL-valsartan  1 tablet Per G Tube BID    scopolamine  1 patch Transdermal Q3 Days        Continuous Infusions:       PRN Meds:  acetaminophen, enalaprilat, glycopyrrolate, hydrALAZINE, labetalol, ondansetron       VTE prophylaxis:     Patient condition:  Stable/Fair/Guarded/ Serious/ Critical    Anticipated discharge and Disposition:         All diagnosis and differential diagnosis have been reviewed; assessment and plan has been documented; I have personally reviewed the labs and test results that are presently available; I have reviewed the patients medication list; I have reviewed the consulting providers response and recommendations. I have reviewed or attempted to review medical records based upon their availability    All of the patient's questions have been  addressed and answered. Patient's is agreeable to the above stated plan. I will continue to  monitor closely and make adjustments to medical management as needed.  _____________________________________________________________________    Nutrition Status:    Radiology:  XR Gastric tube check, non-radiologist performed  EXAMINATION  XR GASTRIC TUBE CHECK, NON-RADIOLOGIST PERFORMED    CLINICAL HISTORY  Replaced NG;    TECHNIQUE  A total of 1 AP image(s) submitted of the partially visualized lower chest and upper abdomen.    COMPARISON  20 April 2023    FINDINGS  Lines/tubes/devices: Esophagogastric tube remains in place, slightly advanced in the interval with the radiolucent side port marker approximately 4 cm distal to the GE junction.  Multiple ECG leads overlie the chest.  ICD lead is unchanged in the interval.    The visualized cardiopulmonary structures demonstrate no new or worsening focal abnormality.  No enlarging pleural effusion or convincing pneumothorax.  There is no new focal abnormality of the included upper abdomen.  Regional osseous structures are similar.    IMPRESSION  1. Slight interval advancement of NG tube, as above.  2. No evidence of new or worsening abnormality.    Electronically signed by: Brandon Constantino  Date:    05/03/2023  Time:    13:29  Fl Modified Barium Swallow Speech  See procedure notes from Speech Pathologist.    This procedure was auto-finalized.      Kiarra Bernal MD   05/04/2023

## 2023-05-04 NOTE — PT/OT/SLP PROGRESS
Speech Language Pathology Department  Dysphagia Therapy Progress Note    Patient Name:  Marino Mccrary   MRN:  [id number]  Admitting Diagnosis: unruptured cerebral aneurysm s/p coiling    Recommendations:     General recommendations:  dysphagia therapy  Diet recommendations:  NPO, Liquid Diet Level: NPO     Discharge recommendations:  nursing facility, skilled   Barriers to safe discharge:  level of skilled assistance needed    Subjective     Patient awake, alert, and cooperative.    Pain/Comfort: None    Spiritual/Cultural/Baptism Beliefs/Practices that affect care: no    Respiratory Status: room air    Objective:     Oral Musculature  Dentition: edentulous  Secretion Management: problems swallowing saliva  Mucosal Quality: good  Facial Movement: WFL  Vocal Quality: strained/strangled  Volitional Cough: Unable to clear secretions    Therapeutic Activities:  Pt completed base of tongue and laryngeal x50 with moderate cues.  Pt tolerated thermal stimulation to the anterior faucial pillars x12 with 60% swallow responses.  Laryngeal excursion fair.  Delay varied 3-10+ seconds.    Assessment:     Pt continues to present with oropharyngeal dysphagia and remains unsafe for PO intake.    Goals:     Multidisciplinary Problems       SLP Goals          Problem: SLP    Goal Priority Disciplines Outcome   SLP Goal     SLP Ongoing, Progressing   Description:   LTG: Tolerate least restrictive PO diet with no clinical signs/sx aspiration - progressing  STGs:  1.  Complete base of tongue and laryngeal strengthening exercises with minimal cues - progressing  2.  Tolerate thermal stimulation to the anterior faucial pillars with 100% effortful swallow responses and delay less than 2 seconds - progressing                       Patient Education:     Patient provided with verbal education regarding swallow function, risks of aspiration, and SLP POC.  Understanding was verbalized, however additional teaching warranted.    Plan:      Will continue to follow and tx as appropriate.    SLP Follow-Up:  Yes   Patient to be seen:  daily   Plan of Care expires:  05/17/23  Plan of Care reviewed with:  patient       Time Tracking:     SLP Treatment Date:   5/4/2023  Speech Start Time:  1300  Speech Stop Time:  1315  Speech Total Time (min):  15    Billable minutes:  Treatment of Swallow Dysfunction, 15 minutes        05/04/2023

## 2023-05-04 NOTE — PT/OT/SLP PROGRESS
Occupational Therapy   Treatment    Name: Marino Mccrary  MRN: [id number]  Admitting Diagnosis:  <principal problem not specified>  8 Days Post-Op    Recommendations:     Discharge Recommendations: rehabilitation facility  Discharge Equipment Recommendations:  to be determined by next level of care  Barriers to discharge:       Assessment:     Marino Mccrary is a 63 y.o. male with a medical diagnosis of <principal problem not specified>.  He presents with increased impulsivity and decreased safety awareness. Performance deficits affecting function are weakness, gait instability, decreased upper extremity function, decreased lower extremity function, impaired balance, impaired endurance, decreased safety awareness, impaired cognition, impaired self care skills, impaired functional mobility.     Rehab Prognosis:  Good; patient would benefit from acute skilled OT services to address these deficits and reach maximum level of function.       Plan:     Patient to be seen 5 x/week to address the above listed problems via self-care/home management, therapeutic activities, therapeutic exercises, neuromuscular re-education  Plan of Care Expires: 05/10/23  Plan of Care Reviewed with: patient    Subjective     Pain/Comfort:       Objective:     Communicated with: nurse prior to session.  Patient found HOB elevated with wen catheter, telemetry upon OT entry to room.    General Precautions: Standard, fall    Orthopedic Precautions:N/A  Braces: N/A     Occupational Performance:     Bed Mobility:    Min A secondary to impulsivity and poor safety     Functional Mobility/Transfers:  Sit to stand Min A  Functional Mobility: Min A, however patient very impulsive    Activities of Daily Living:  Toileting with Min/Mod A secondary to impulsive behavior and poor safety      [facility] 6 Click ADL: 19    Patient Education:  Patient provided with verbal and demonstration education regarding safety awareness.  Additional teaching is  warranted.      Patient left ambulatory in room/pfeiffer with  PT    GOALS:   Multidisciplinary Problems       Occupational Therapy Goals          Problem: Occupational Therapy    Goal Priority Disciplines Outcome Interventions   Occupational Therapy Goal     OT, PT/OT Ongoing, Progressing    Description: Goals to be met by: Discharge     Patient will increase functional independence with ADLs by performing:    LE Dressing with Modified Haakon.  Grooming while standing at sink with Modified Haakon.  Toileting from toilet with Modified Haakon for hygiene and clothing management.   Bathing from  shower chair/bench with Modified Haakon.  Toilet transfer to toilet with Modified Haakon.                         Time Tracking:     OT Date of Treatment: 05/04/23  OT Start Time: 1018  OT Stop Time: 1044  OT Total Time (min): 26 min    Billable Minutes:Self Care/Home Management 11  Therapeutic Activity 15    OT/EDVIN: EDVIN     Number of EDVIN visits since last OT visit: 4 5/4/2023

## 2023-05-04 NOTE — PLAN OF CARE
GI Plan of Care    Chart reviewed. Appreciate Neurology recommendations regarding Plavix. Discussed patient care with nurse- plan to begin holding Plavix tomorrow am and will continue ASA 300mg OH qd. If held starting tomorrow, will tentatively plan for PEG placement Tuesday 05/09 and will resume ASAP following procedure.     Geovanna Pandey PA-C

## 2023-05-04 NOTE — PT/OT/SLP RE-EVAL
Physical Therapy Re-Evaluation    Patient Name:  Marino Mccrary   MRN:  [id number]    Recommendations:     Discharge Recommendations: rehabilitation facility   Discharge Equipment Recommendations:  (pending progress)   Barriers to discharge: Decreased caregiver support and Impaired mobility    Assessment:     Marino Mccrary is a 63 y.o. male admitted with a medical diagnosis of L HAWA aneurysm s/p coiling. He presents with the following impairments/functional limitations: weakness, impaired endurance, impaired functional mobility, gait instability, impaired balance, decreased coordination, impaired cognition. Patient tolerated PT re-evaluation well and is progressing appropriately towards functional PT goals. Patient remains limited by impulsiveness and impaired safety awareness.     Rehab Prognosis: Good; patient would benefit from acute skilled PT services to address these deficits and reach maximum level of function.    Recent Surgery: * No procedures listed * 8 Days Post-Op    Plan:     During this hospitalization, patient to be seen 6 x/week to address the identified rehab impairments via gait training, therapeutic activities, therapeutic exercises, neuromuscular re-education and progress toward the following goals:    Plan of Care Expires:  05/31/23    Subjective     Chief Complaint: none stated  Patient/Family Comments/goals: to get stronger  Pain/Comfort:  Pain Rating 1: 0/10    Patients cultural, spiritual, Lutheran conflicts given the current situation: no    Objective:     Communicated with RN prior to session.  Patient found  up in restroom with LONDONO  with peripheral IV, telemetry  upon PT entry to room.    General Precautions: Standard, fall  Orthopedic Precautions:N/A   Braces: N/A  Respiratory Status: Room air      Exams:  RLE ROM: WNL  RLE Strength: WNL  LLE ROM: WNL  LLE Strength: Deficits: grossly 3+/5  Skin integrity: Visible skin intact      Functional Mobility:  Bed Mobility:  Sit to  Supine: minimum assistance  Transfers:  Sit to Stand:  minimum assistance with hand-held assist  Gait: patient ambulated 200ft with Nicole, HHA; patient with narrow EVAN, occasional scissor step and slight lateral lean. Pt required cues to attend to obstacles in environment.       AM-PAC 6 CLICK MOBILITY  Total Score:18       Treatment & Education:    Patient provided with verbal education regarding PT POC, safety awareness.  Understanding was verbalized, however additional teaching warranted.     Patient left HOB elevated with all lines intact, call button in reach, and bed alarm on.    GOALS:   Multidisciplinary Problems       Physical Therapy Goals          Problem: Physical Therapy    Goal Priority Disciplines Outcome Goal Variances Interventions   Physical Therapy Goal     PT, PT/OT Ongoing, Progressing     Description: Goals to be met by: 23     Patient will increase functional independence with mobility by performin. Supine to sit with Stand-by Assistance  2. Sit to supine with Stand-by Assistance  3. Sit to stand transfer with Stand-by Assistance  4. Gait  x 150 feet with Stand-by Assistance using No Assistive Device.                          History:     Past Medical History:   Diagnosis Date    Acid reflux     Cerebral aneurysm     Heart disease     Hypertension     Mixed hyperlipidemia        Past Surgical History:   Procedure Laterality Date    BACK SURGERY      CARDIAC SURGERY      INSERT / REPLACE / REMOVE PACEMAKER Left     KNEE SURGERY         Time Tracking:     PT Received On: 23  PT Start Time: 1042     PT Stop Time: 1055  PT Total Time (min): 13 min     Billable Minutes: Re-eval 13min      2023

## 2023-05-04 NOTE — PROGRESS NOTES
Patient was not seen, chart reviewed     Discussed with Dr. King about PEG:  -ideally Plavix would continue with a recent stent  -if Plavix cannot be stopped 2/2 PEG tube placement, it should be resumed ASAP after the procedure  -Please continue aspirin daily   -ok with aspirin 300 mg IN daily

## 2023-05-05 PROCEDURE — 25000003 PHARM REV CODE 250: Performed by: INTERNAL MEDICINE

## 2023-05-05 PROCEDURE — 97116 GAIT TRAINING THERAPY: CPT | Mod: CQ

## 2023-05-05 PROCEDURE — C1751 CATH, INF, PER/CENT/MIDLINE: HCPCS

## 2023-05-05 PROCEDURE — 97168 OT RE-EVAL EST PLAN CARE: CPT

## 2023-05-05 PROCEDURE — 36410 VNPNXR 3YR/> PHY/QHP DX/THER: CPT

## 2023-05-05 PROCEDURE — 25000003 PHARM REV CODE 250: Performed by: STUDENT IN AN ORGANIZED HEALTH CARE EDUCATION/TRAINING PROGRAM

## 2023-05-05 PROCEDURE — S4991 NICOTINE PATCH NONLEGEND: HCPCS | Performed by: STUDENT IN AN ORGANIZED HEALTH CARE EDUCATION/TRAINING PROGRAM

## 2023-05-05 PROCEDURE — 92526 ORAL FUNCTION THERAPY: CPT

## 2023-05-05 PROCEDURE — 11000001 HC ACUTE MED/SURG PRIVATE ROOM

## 2023-05-05 RX ADMIN — ATORVASTATIN CALCIUM 10 MG: 10 TABLET, FILM COATED ORAL at 11:05

## 2023-05-05 RX ADMIN — METOPROLOL TARTRATE 12.5 MG: 25 TABLET, FILM COATED ORAL at 11:05

## 2023-05-05 RX ADMIN — POLYETHYLENE GLYCOL 3350 17 G: 17 POWDER, FOR SOLUTION ORAL at 11:05

## 2023-05-05 RX ADMIN — NICOTINE 1 PATCH: 21 PATCH, EXTENDED RELEASE TRANSDERMAL at 11:05

## 2023-05-05 RX ADMIN — SACUBITRIL AND VALSARTAN 1 TABLET: 24; 26 TABLET, FILM COATED ORAL at 11:05

## 2023-05-05 RX ADMIN — ASPIRIN 300 MG: 300 SUPPOSITORY RECTAL at 11:05

## 2023-05-05 RX ADMIN — LEUCINE, PHENYLALANINE, LYSINE, METHIONINE, ISOLEUCINE, VALINE, HISTIDINE, THREONINE, TRYPTOPHAN, ALANINE, GLYCINE, ARGININE, PROLINE, SERINE, TYROSINE, SODIUM ACETATE, DIBASIC POTASSIUM PHOSPHATE, MAGNESIUM CHLORIDE, SODIUM CHLORIDE, CALCIUM CHLORIDE, DEXTROSE
311; 238; 247; 170; 255; 247; 204; 179; 77; 880; 438; 489; 289; 213; 17; 297; 261; 51; 77; 33; 5 INJECTION INTRAVENOUS at 01:05

## 2023-05-05 NOTE — PT/OT/SLP PROGRESS
Physical Therapy Treatment    Patient Name:  Marino Mccrary   MRN:  [id number]    Recommendations:     Discharge Recommendations: rehabilitation facility  Discharge Equipment Recommendations:  (pending progress)  Barriers to discharge: Decreased caregiver support and Impaired mobility    Assessment:     Marino Mccrary is a 63 y.o. male admitted with a medical diagnosis of <principal problem not specified>.  He presents with the following impairments/functional limitations: weakness, impaired balance, decreased safety awareness, impaired endurance, impaired cardiopulmonary response to activity, impaired self care skills, impaired functional mobility, decreased coordination, decreased lower extremity function, gait instability .    Rehab Prognosis: Good; patient would benefit from acute skilled PT services to address these deficits and reach maximum level of function.    Recent Surgery: * No procedures listed * 9 Days Post-Op    Plan:     During this hospitalization, patient to be seen 6 x/week to address the identified rehab impairments via gait training, therapeutic activities and progress toward the following goals:    Plan of Care Expires:  05/31/23    Subjective     Chief Complaint:   Patient/Family Comments/goals:   Pain/Comfort:         Objective:     Communicated with nurse prior to session.  Patient found supine with telemetry, pulse ox (continuous), peripheral IV upon PT entry to room.     General Precautions: Standard, fall  Orthopedic Precautions: N/A  Braces: N/A  Respiratory Status: Room air  Blood Pressure:   Skin Integrity: Visible skin intact      Functional Mobility:  Bed Mobility:     Supine to Sit: minimum assistance  Sit to Supine: contact guard assistance  Transfers:     Sit to Stand:  minimum assistance with no AD  Gait: pt amb for approx 75ft with no Ad and Nicole with 2 bouts of unsteadiness requiring assistance to correct    Therapeutic Activities/Exercises:  Performed standing hip flexion  with R UE support on handrail for 10 reps     Education:  Patient provided with verbal education regarding safety awareness.  Understanding was verbalized, however additional teaching warranted.     Patient left HOB elevated with all lines intact, call button in reach, and bed alarm on..    GOALS:   Multidisciplinary Problems       Physical Therapy Goals          Problem: Physical Therapy    Goal Priority Disciplines Outcome Goal Variances Interventions   Physical Therapy Goal     PT, PT/OT Ongoing, Progressing     Description: Goals to be met by: 23     Patient will increase functional independence with mobility by performin. Supine to sit with Stand-by Assistance  2. Sit to supine with Stand-by Assistance  3. Sit to stand transfer with Stand-by Assistance  4. Gait  x 150 feet with Stand-by Assistance using No Assistive Device.                          Time Tracking:     PT Received On: 23  PT Start Time: 929     PT Stop Time: 943  PT Total Time (min): 14 min     Billable Minutes: Gait Training 14    Treatment Type: Treatment  PT/PTA: PTA     Number of PTA visits since last PT visit: 1     2023

## 2023-05-05 NOTE — PT/OT/SLP PROGRESS
Speech Language Pathology Department  Dysphagia Therapy Progress Note    Patient Name:  Marino Mccrary   MRN:  [id number]  Admitting Diagnosis: unruptured cerebral aneurysm s/p coiling    Recommendations:     General recommendations:  dysphagia therapy  Diet recommendations:  NPO, Liquid Diet Level: NPO   Discharge recommendations:  nursing facility, skilled   Barriers to safe discharge:  level of skilled assistance needed    Subjective     Patient awake, alert, and cooperative.    Pain/Comfort: None    Spiritual/Cultural/Confucianist Beliefs/Practices that affect care: no    Respiratory Status: room air    Objective:     Oral Musculature  Dentition: edentulous    Therapeutic Activities:  Pt completed base of tongue exercises x50 with minimal-moderate cues.  Pt completed laryngeal strengthening exercises x40 with moderate cues.    Assessment:     Pt continues to present with oropharyngeal dysphagia and remains unsafe for PO intake.    Goals:     Multidisciplinary Problems       SLP Goals          Problem: SLP    Goal Priority Disciplines Outcome   SLP Goal     SLP Ongoing, Progressing   Description:   LTG: Tolerate least restrictive PO diet with no clinical signs/sx aspiration - progressing  STGs:  1.  Complete base of tongue and laryngeal strengthening exercises with minimal cues - progressing  2.  Tolerate thermal stimulation to the anterior faucial pillars with 100% effortful swallow responses and delay less than 2 seconds - progressing                       Patient Education:     Patient provided with verbal education regarding SLP POC.  Understanding was verbalized, however additional teaching warranted.    Plan:     Will continue to follow and tx as appropriate.    SLP Follow-Up:  Yes   Patient to be seen:  daily   Plan of Care expires:  05/17/23  Plan of Care reviewed with:  patient       Time Tracking:     SLP Treatment Date:   05/05/23  Speech Start Time:  0915  Speech Stop Time:  0925     Speech Total Time  (min):  10 min    Billable minutes:  Treatment of Swallow Dysfunction, 10 minutes        05/05/2023

## 2023-05-05 NOTE — PROGRESS NOTES
[facility] Medicine Progress Note        Chief Complaint: Inpatient Follow-up     HPI:   Mr Mccrary is a 63 y.o. 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 King.  Patient was admitted to ICU postprocedure for monitoring.  He had a delayed recovery from anesthesia.  Patient has a history of right MCA stroke with left-sided residual weakness.  At some point while he was still under the effect of anesthesia, he was unable to move his right lower extremity which has since resolved.  Patient still have weakness in the left upper extremity, which per ICU nurse, his sister confirmed that it is residual from his previous stroke.  Sister reported that patient lives alone but he is pretty functional given he cooks for himself, does his grocery shopping, drives and mows his yard as well.    During ICU stay, overnight, had some confusion, CT head repeated was negative.  Patient did receive IV Ativan.    Patient speech is also dysarthric from his previous stroke.  Sister confirmed that every time patient eat or drink something he coughs. That made as concern for swallow related issues and concern for aspiration as well.  Speech was consulted; following recs. Physical therapy and occupational therapy also consulted; will follow recs for DC planning. Noted to be somewhat confused but able to answer most questions. Nurse stating that this is patient's baseline. Patient is still has NG tube and is receiving TF.         Patient currently being managed for right MCA CVA with left-sided deficits, complicated by oropharyngeal dysphagia on NG tube.  Awaiting PEG tube placement but needs to hold Plavix  for 5 days. Neuro stays it is okay to hold Plavix and continue aspirin rectally or via NG tube and resume Plavix immediately after PEG tube has been placed       Interval Hx:   Patient seen and examined at bedside  Patient has no new complaints today   Vitals reviewed and stable   He does not want the NG tube inserted today will try tomorrow  Begin IV Clinimix 75 cc/hour for nutritional support       Objective/physical exam:  General: In no acute distress, afebrile  Chest:  Coarse breath sounds anteriorly, on room air  Heart: RRR, +S1, S2, no appreciable murmur  Abdomen: NGT in place; Soft, nontender, BS +  MSK: Warm, no lower extremity edema, no clubbing or cyanosis  Neurologic:  Awake and alert and oriented.  Speech has improved since I last saw him on 4/28,  Strength 4/5 in LUE/LLE & 5/5 in RUE/RLE        Assessment/Plan:  Oropharyngeal dysphagia on NG tube  s/p Coil Embolization of L AMBER Aneurysm  Prior R MCA CVA with Residual LUE/LLE Weakness, L Facial Droop  HTN, bp is borderline low   Nicotine dependence with current smoking status        Plan  Vitals reviewed and stable   He does not want the NG tube inserted today will try tomorrow  Begin IV Clinimix 75 cc/hour for nutritional support  Neuro stays it is okay to hold Plavix and continue aspirin rectally or via NG tube and resume Plavix immediately after PEG tube has been placed  Hold plavix  reduce metoprolol to 12.5 mg b.i.d.   PT/OT on board--> recommended snf  GI consulted for PEG tube , patient will have to be off of Plavix for 5 days so tentatively plan for PEG tube placement on Tuesday    Patient continued on ASA, Atorvastatin,  Metoprolol Tartrate, Entresto        VTE prophylaxis: SCDs     Patient condition:  Stable     Anticipated discharge and Disposition:   SNF once medically ready, probable PEG tube      Tobacco cessation counseling provided, patient is not ready to quit   Tobacco cessation time 5-10 minutes         VITAL SIGNS: 24 HRS MIN & MAX  LAST   Temp  Min: 97.6 °F (36.4 °C)  Max: 98.6 °F (37 °C) 98.4 °F (36.9 °C)   BP  Min: 111/72  Max: 149/93 134/84   Pulse  Min: 65  Max: 75  65   No data recorded 18   SpO2  Min: 95 %  Max: 98 % 98 %       Recent Labs   Lab 04/30/23  0121 05/02/23  0459 05/04/23  0529   WBC 9.1 9.82 8.47   RBC 5.22 5.28 5.03   HGB 15.1 15.4 14.7   HCT 44.9 45.8 44.3   MCV 86.0 86.7 88.1   MCH 28.9 29.2 29.2   MCHC 33.6 33.6 33.2   RDW 14.5 14.6 14.4    237 275   MPV 10.9* 10.5* 10.2       Recent Labs   Lab 04/29/23  0757 04/30/23  0121 05/02/23 0459 05/04/23  0529    137 136 137   K 4.3 4.1 4.1 4.4   CO2 24 19* 20* 25   BUN 15.1 19.5 29.6* 19.6   CREATININE 0.81 0.71* 0.83 0.72*   CALCIUM 9.3 9.1 9.2 9.3   MG 2.00 1.90 2.10  --    ALBUMIN 3.9 3.6 3.4 3.1*   ALKPHOS 67 65 61 68   ALT 30 26 18 19   AST 32 26 18 19   BILITOT 1.6* 1.4 1.1 0.7          Microbiology Results (last 7 days)       ** No results found for the last 168 hours. **             See below for Radiology    Scheduled Med:   aspirin  300 mg Rectal Daily    atorvastatin  10 mg Per NG tube Daily    metoprolol tartrate  12.5 mg Per NG tube BID    nicotine  1 patch Transdermal Daily    polyethylene glycol  17 g Per NG tube Daily    sacubitriL-valsartan  1 tablet Per G Tube BID    scopolamine  1 patch Transdermal Q3 Days        Continuous Infusions:   Amino acid 4.25% - dextrose 5% (CLINIMIX-E) solution (1L provides 42.5 gm AA, 50 gm CHO (170 kcal/L dextrose), Na 35, K 30, Mg 5, Ca 4.5, Acetate 70, Cl 39, Phos 15)          PRN Meds:  acetaminophen, enalaprilat, glycopyrrolate, hydrALAZINE, labetalol, ondansetron       Assessment/Plan:      VTE prophylaxis:     Patient condition:  Stable/Fair/Guarded/ Serious/ Critical    Anticipated discharge and Disposition:         All diagnosis and differential diagnosis have been reviewed; assessment and plan has been documented; I have personally reviewed the labs and test results that are presently available; I have  reviewed the patients medication list; I have reviewed the consulting providers response and recommendations. I have reviewed or attempted to review medical records based upon their availability    All of the patient's questions have been  addressed and answered. Patient's is agreeable to the above stated plan. I will continue to monitor closely and make adjustments to medical management as needed.  _____________________________________________________________________    Nutrition Status:    Radiology:  XR Gastric tube check, non-radiologist performed  EXAMINATION  XR GASTRIC TUBE CHECK, NON-RADIOLOGIST PERFORMED    CLINICAL HISTORY  Replaced NG;    TECHNIQUE  A total of 1 AP image(s) submitted of the partially visualized lower chest and upper abdomen.    COMPARISON  20 April 2023    FINDINGS  Lines/tubes/devices: Esophagogastric tube remains in place, slightly advanced in the interval with the radiolucent side port marker approximately 4 cm distal to the GE junction.  Multiple ECG leads overlie the chest.  ICD lead is unchanged in the interval.    The visualized cardiopulmonary structures demonstrate no new or worsening focal abnormality.  No enlarging pleural effusion or convincing pneumothorax.  There is no new focal abnormality of the included upper abdomen.  Regional osseous structures are similar.    IMPRESSION  1. Slight interval advancement of NG tube, as above.  2. No evidence of new or worsening abnormality.    Electronically signed by: Brandon Constantino  Date:    05/03/2023  Time:    13:29  Fl Modified Barium Swallow Speech  See procedure notes from Speech Pathologist.    This procedure was auto-finalized.      Kiarra Bernal MD   05/05/2023

## 2023-05-05 NOTE — PROGRESS NOTES
Inpatient Nutrition Assessment    Admit Date: 4/26/2023   Total duration of encounter: 9 days     Nutrition Recommendation/Prescription     Change TF recommendations to the following now pt is out of ICU: Fibersource HN  @ 75 mL/hr with 100mL free water q4hrs.   Kcal: 1800 kcal/day (~99% est kcal needs)  Protein: 81 g/day (~90% est protein needs)  Fluid: 1815 mL (~100% est fluid needs)    2. PPN recommendations due to order:  Recommendation: Clinimix 4.25/5 @ 85 mL/hr  Kcal: 694 kcal/day (~38% est kcal needs)  AA: 87 g/day (~97% est protein needs)  Dextrose: 102 g/day (GIR: 0.94 mg/kg/min)  Fluid: 2040 mL/day (~112% est fluid needs)    3. Monitor tolerance, weight, and labs      Communication of Recommendations: reviewed with nurse and reviewed with patient    Nutrition Assessment     Malnutrition Assessment/Nutrition-Focused Physical Exam    Malnutrition in the context of acute illness or injury  Degree of Malnutrition: unable to complete  Energy Intake: unable to obtain  Interpretation of Weight Loss: >7.5% in 3 months (per EMR)  Body Fat:does not meet criteria  Area of Body Fat Loss: does not meet criteria  Muscle Mass Loss: does not meet criteria  Area of Muscle Mass Loss: does not meet criteria  Fluid Accumulation: unable to obtain  Edema: unable to obtain   Reduced  Strength: unable to obtain  A minimum of two characteristics is recommended for diagnosis of either severe or non-severe malnutrition.    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  History of CVA to the right MCA with residual left-sided upper and lower extremity weakness and left-sided facial droop  Hypertension    Relevant Medical History:    Acid reflux      Cerebral aneurysm      Heart disease       "Hypertension      Mixed hyperlipidemia        Nutrition-Related Medications: miralax  Calorie Containing IV Medications: Clinimix (not running at time of visit)    Nutrition-Related Labs:  2023: Total Bili: 1.6 and Glucose 93  : Crea-0.71  2023: Crea 0.72, Alb 3.1, Gluc 126    Diet/PN Order: Diet NPO  Amino acid 4.25% - dextrose 5% (CLINIMIX-E) solution (1L provides 42.5 gm AA, 50 gm CHO (170 kcal/L dextrose), Na 35, K 30, Mg 5, Ca 4.5, Acetate 70, Cl 39, Phos 15)  Oral Supplement Order: none  Tube Feeding Order:  Fibersource HN @ 75 mL/hr (see below for calculation) not running, awaiting PEG placement  Appetite/Oral Intake: NPO/NPO  Factors Affecting Nutritional Intake: NPO  Food/[place]/Cultural Preferences: unable to obtain  Food Allergies: unable to obtain       Wound(s):   none noted.    Comments    2023: Pt unable to answer questions. Per EMR, pt weighed 83.9 kg on 2023 (11% wt loss in 3 months, significant). Provided TF recommendations of Peptamen @ 75 mL/hr. Pt currently has Peptamen @ 10 mL/hr running, no issues noted thus far per nurse. Will monitor.  23: Pt out of ICU now. Continues on tube feeds. Will change Tube feed formula and adjust nutritional needs accordingly.   2023: Pt expecting PEG placement either  or . TF on hold at this time due to accidental NGT removal per pt. Clinimix now ordered, was not hanging at time of visit. Pt denies N/V. Last BM documented as 2023. Will monitor.    Anthropometrics    Height: 5' 8" (172.7 cm) Height Method: Stated  Last Weight: 74.8 kg (164 lb 14.5 oz) (23 1026) Weight Method: Standard Scale  BMI (Calculated): 25.1  BMI Classification: overweight (BMI 25-29.9)        Ideal Body Weight (IBW), Male: 154 lb     % Ideal Body Weight, Male (lb): 107.08 %                 Usual Body Weight (UBW), k.9 kg  % Usual Body Weight: 89.34     Usual Weight Provided By: EMR weight history    Wt Readings from Last 5 Encounters: "   04/26/23 74.8 kg (164 lb 14.5 oz)   03/09/23 77 kg (169 lb 12.1 oz)   01/23/23 83.9 kg (185 lb)   01/08/23 83.9 kg (185 lb)     Weight Change(s) Since Admission:  Admit Weight: 74.8 kg (164 lb 14.5 oz) (04/26/23 1026)  4/26/2023: 74.8 kg  5/1: no new wt  5/5/2023: no new wts logged    Estimated Needs    Weight Used For Calorie Calculations: 74.8 kg (164 lb 14.5 oz)  Energy Calorie Requirements (kcal): 7535-2735 (MSJ x 1.2-1.3)  Energy Need Method: [place]-St Jeor  Weight Used For Protein Calculations: 74.8 kg (164 lb 14.5 oz)  Protein Requirements:  (1.2-2.0 g/kg)  Fluid Requirements (mL): 1816 (1 mL/kcal)           Enteral Nutrition    Patient not receiving enteral nutrition support at this time.    Parenteral Nutrition    Patient not receiving parenteral nutrition support at this time.    Evaluation of Received Nutrient Intake    Calories: not meeting estimated needs  Protein: not meeting estimated needs    Patient Education    Not applicable.    Nutrition Diagnosis     PES: Increased nutrient needs related to acute illness as evidenced by increased nutrient needs due to hospitalization. (continues)    Interventions/Goals     Intervention(s): modified composition of enteral nutrition  Goal: Tolerate enteral feeding at goal rate by follow-up. (goal progressing)    Monitoring & Evaluation     Dietitian will monitor enteral nutrition intake, weight, electrolyte/renal panel, glucose/endocrine profile, and gastrointestinal profile.  Nutrition Risk/Follow-Up: moderate (follow-up in 3-5 days)   Please consult if re-assessment needed sooner.

## 2023-05-05 NOTE — PLAN OF CARE
Problem: Occupational Therapy  Goal: Occupational Therapy Goal  Description: Goals to be met by: Discharge     Patient will increase functional independence with ADLs by performing:    LE Dressing with Modified [place].  Grooming while standing at sink with Modified [place].  Toileting from toilet with Modified [place] for hygiene and clothing management.   Bathing from  shower chair/bench with Modified [place].  Toilet transfer to toilet with Modified [place].    Outcome: Ongoing, Progressing

## 2023-05-05 NOTE — PT/OT/SLP RE-EVAL
Occupational Therapy   Re-evaluation    Name: Marino Mccrary  MRN: [id number]  Admitting Diagnosis:  <principal problem not specified>  Recent Surgery: * No procedures listed * 9 Days Post-Op    Recommendations:     Discharge Recommendations: rehabilitation facility  Discharge Equipment Recommendations: to be determined by next level of care  Barriers to discharge:       Assessment:     Marino Mccrary is a 63 y.o. male with a medical diagnosis of s/p coil embolization of L AMBER aneurysm.  He presents with the following performance deficits affecting function are weakness, impaired endurance, impaired self care skills, impaired functional mobility, gait instability, impaired balance, impaired cognition, decreased coordination.        Rehab Prognosis:  good; patient would benefit from acute skilled OT services to address these deficits and reach maximum level of function.       Plan:     Patient to be seen 3 x/week, 5 x/week to address the above listed problems via self-care/home management, therapeutic activities, therapeutic exercises  Plan of Care Expires: 05/10/23  Plan of Care Reviewed with: patient    Subjective       Communicated with: nrsg prior to session.  Pain/Comfort:  Pain Rating 1: 0/10    Objective:     Communicated with: nrsg prior to session.  Patient found HOB elevated with: bed alarm upon OT entry to room.    General Precautions: Standard, fall  Orthopedic Precautions: N/A  Braces: N/A  Respiratory Status:   Vital Signs:      Occupational Performance:    Bed Mobility:    Patient completed Supine to Sit with stand by assistance  Patient completed Sit to Supine with stand by assistance    Functional Mobility/Transfers:  Patient completed Toilet Transfer Step Transfer technique with contact guard assistance with  no AD  Functional Mobility: no LOB; CGA for safety     Activities of Daily Living:  Grooming: supervision ; standing at sink ;SBA for balance; pt able to open all packages and perform oral  care   Upper Body Dressing: minimum assistance for buttoning/donning   Lower Body Dressing: minimum assistance    Toileting: contact guard assistance      Cognitive/Visual Perceptual:  Cognitive/Psychosocial Skills:     -       Safety awareness/insight to disability: impaired     Physical Exam:  Sensation:    -       Intact  Upper Extremity Strength:    -       Right Upper Extremity: WNL  -       Left Upper Extremity: Deficits: -3/5   Fine Motor Coordination:    -       Impaired  Left hand, finger to nose   and Left hand thumb/finger opposition skills      Therapeutic Positioning  Risk for acquired pressure injuries is decreased due to ability to get to BSC/toilet with assist.      [facility] 6 Click:  [facility] Total Score:      Education:  Patient provided with verbal education regarding OT role/goals/POC, fall prevention, and safety awareness.  Understanding was verbalized.         Patient left HOB elevated with all lines intact, call button in reach, and bed alarm on    GOALS:   Multidisciplinary Problems       Occupational Therapy Goals          Problem: Occupational Therapy    Goal Priority Disciplines Outcome Interventions   Occupational Therapy Goal     OT, PT/OT Ongoing, Progressing    Description: Goals to be met by: Discharge     Patient will increase functional independence with ADLs by performing:    LE Dressing with Modified [place].  Grooming while standing at sink with Modified [place].  Toileting from toilet with Modified [place] for hygiene and clothing management.   Bathing from  shower chair/bench with Modified [place].  Toilet transfer to toilet with Modified [place].                         History:     Past Medical History:   Diagnosis Date    Acid reflux     Cerebral aneurysm     Heart disease     Hypertension     Mixed hyperlipidemia          Past Surgical History:   Procedure Laterality Date    BACK SURGERY      CARDIAC SURGERY      INSERT / REPLACE / REMOVE PACEMAKER Left      KNEE SURGERY         Time Tracking:     OT Date of Treatment:    OT Start Time: 0945  OT Stop Time: 0957  OT Total Time (min): 12 min    Billable Minutes:Re-eval 12min    5/5/2023

## 2023-05-05 NOTE — NURSING
At 1044 pt pulled out ng tube with therapist assisting him with urination. Md is aware. She spoke to pt and he asked for a break from ng tube until tomorrow 05/05/2023. Plavix is on hold for peg placement. ASA is given via rectum. Plavix is to resume immediately post peg placement

## 2023-05-06 PROCEDURE — 97530 THERAPEUTIC ACTIVITIES: CPT | Mod: CQ

## 2023-05-06 PROCEDURE — 25000003 PHARM REV CODE 250: Performed by: INTERNAL MEDICINE

## 2023-05-06 PROCEDURE — 97110 THERAPEUTIC EXERCISES: CPT | Mod: CQ

## 2023-05-06 PROCEDURE — 11000001 HC ACUTE MED/SURG PRIVATE ROOM

## 2023-05-06 PROCEDURE — 25000003 PHARM REV CODE 250: Performed by: STUDENT IN AN ORGANIZED HEALTH CARE EDUCATION/TRAINING PROGRAM

## 2023-05-06 PROCEDURE — 97530 THERAPEUTIC ACTIVITIES: CPT

## 2023-05-06 PROCEDURE — 97116 GAIT TRAINING THERAPY: CPT | Mod: CQ

## 2023-05-06 PROCEDURE — S4991 NICOTINE PATCH NONLEGEND: HCPCS | Performed by: STUDENT IN AN ORGANIZED HEALTH CARE EDUCATION/TRAINING PROGRAM

## 2023-05-06 RX ORDER — FENTANYL CITRATE 50 UG/ML
25 INJECTION, SOLUTION INTRAMUSCULAR; INTRAVENOUS EVERY 5 MIN PRN
Status: DISCONTINUED | OUTPATIENT
Start: 2023-05-06 | End: 2023-05-15 | Stop reason: HOSPADM

## 2023-05-06 RX ORDER — SODIUM CHLORIDE 0.9 % (FLUSH) 0.9 %
10 SYRINGE (ML) INJECTION
Status: DISCONTINUED | OUTPATIENT
Start: 2023-05-06 | End: 2023-05-15 | Stop reason: HOSPADM

## 2023-05-06 RX ORDER — HYDROMORPHONE HYDROCHLORIDE 2 MG/ML
0.2 INJECTION, SOLUTION INTRAMUSCULAR; INTRAVENOUS; SUBCUTANEOUS EVERY 5 MIN PRN
Status: DISCONTINUED | OUTPATIENT
Start: 2023-05-06 | End: 2023-05-15 | Stop reason: HOSPADM

## 2023-05-06 RX ORDER — ACETAMINOPHEN 10 MG/ML
1000 INJECTION, SOLUTION INTRAVENOUS ONCE
Status: DISCONTINUED | OUTPATIENT
Start: 2023-05-06 | End: 2023-05-06

## 2023-05-06 RX ADMIN — ASPIRIN 300 MG: 300 SUPPOSITORY RECTAL at 09:05

## 2023-05-06 RX ADMIN — NICOTINE 1 PATCH: 21 PATCH, EXTENDED RELEASE TRANSDERMAL at 09:05

## 2023-05-06 NOTE — PT/OT/SLP PROGRESS
Physical Therapy Treatment    Patient Name:  Marino Mccrary   MRN:  [id number]    Recommendations:     Discharge Recommendations: rehabilitation facility  Discharge Equipment Recommendations:  (pending progress)  Barriers to discharge: None    Assessment:     Marino Mccrary is a 63 y.o. male admitted with a medical diagnosis of <principal problem not specified>.  He presents with the following impairments/functional limitations: weakness, gait instability, impaired balance, decreased upper extremity function, decreased safety awareness .    Rehab Prognosis: Good; patient would benefit from acute skilled PT services to address these deficits and reach maximum level of function.    Recent Surgery: * No procedures listed * 10 Days Post-Op    Plan:     During this hospitalization, patient to be seen 6 x/week to address the identified rehab impairments via gait training, therapeutic activities, therapeutic exercises, neuromuscular re-education and progress toward the following goals:    Plan of Care Expires:  05/31/23    Subjective     Chief Complaint: none  Patient/Family Comments/goals: To return home  Pain/Comfort:  Pain Rating 1: 0/10      Objective:     Communicated with NSG prior to session.  Patient found HOB elevated with blood pressure cuff, peripheral IV, pulse ox (continuous), telemetry upon PT entry to room.     General Precautions: Standard, fall  Orthopedic Precautions: N/A  Braces: N/A  Respiratory Status: Room air  Skin Integrity: Visible skin intact      Functional Mobility:  Bed Mobility:     Scooting: supervision  Supine to Sit: supervision  Transfers:     Sit to Stand:  contact guard assistance with no AD  Bed to Chair: contact guard assistance with  no AD  using  Step Transfer  Gait: Pt ambulated 200f t and 350 ft, holding and rolling with IV pole, demonstrating consistent foot clearance and heel strike.   Balance: Static balance w/o support, SB to CGA and dynamic balance CGA    Therapeutic  Activities/Exercises:  Pt performed static and dynamic balance tasks to improve balance reactions and safety performing daily tasks.     Education:  Patient provided with verbal and demonstration education regarding transfers and performing daily tasks.  Understanding was verbalized, however additional teaching warranted.     Patient left up in chair with all lines intact, call button in reach, chair alarm on, and pts nurse notified..    GOALS:   Multidisciplinary Problems       Physical Therapy Goals          Problem: Physical Therapy    Goal Priority Disciplines Outcome Goal Variances Interventions   Physical Therapy Goal     PT, PT/OT Ongoing, Progressing     Description: Goals to be met by: 23     Patient will increase functional independence with mobility by performin. Supine to sit with Stand-by Assistance  2. Sit to supine with Stand-by Assistance  3. Sit to stand transfer with Stand-by Assistance  4. Gait  x 150 feet with Stand-by Assistance using No Assistive Device.                          Time Tracking:     PT Received On: 23  PT Start Time: 1408     PT Stop Time: 1442  PT Total Time (min): 34 min     Billable Minutes: Gait Training 15 mins and Therapeutic Activity 19 mins    Treatment Type: Treatment  PT/PTA: PTA     Number of PTA visits since last PT visit: 2     2023

## 2023-05-06 NOTE — PROCEDURES
"Marino Mccrary is a 63 y.o. male patient.    Temp: 99 °F (37.2 °C) (05/05/23 1908)  Pulse: 79 (05/05/23 1908)  Resp: 18 (05/05/23 1611)  BP: 135/81 (05/05/23 1908)  SpO2: 96 % (05/05/23 1908)  Weight: 74.8 kg (164 lb 14.5 oz) (04/26/23 1026)  Height: 5' 8" (172.7 cm) (04/26/23 1026)    PICC  Date/Time: 5/5/2023 10:34 PM  Performed by: Félix Ambrocio RN  Consent Done: Yes  Time out: Immediately prior to procedure a time out was called to verify the correct patient, procedure, equipment, support staff and site/side marked as required  Indications: med administration  Anesthesia: local infiltration  Local anesthetic: lidocaine 1% without epinephrine  Anesthetic Total (mL): 5  Preparation: skin prepped with ChloraPrep  Skin prep agent dried: skin prep agent completely dried prior to procedure  Sterile barriers: all five maximum sterile barriers used - cap, mask, sterile gown, sterile gloves, and large sterile sheet  Hand hygiene: hand hygiene performed prior to central venous catheter insertion  Location details: right basilic  Catheter type: single lumen  Catheter size: 4 Fr  Catheter Length: 4cm    Ultrasound guidance: yes  Vessel Caliber: mediumNeedle advanced into vessel with real time Ultrasound guidance.  Guidewire confirmed in vessel.  Sterile sheath used.  Number of attempts: 1  Post-procedure: sterile dressing applied        Félix Ambrocio Rn  5/5/2023    "

## 2023-05-06 NOTE — PROGRESS NOTES
[facility] Medicine Progress Note        Chief Complaint: Inpatient Follow-up     HPI:   Mr Mccrary is a 63 y.o. 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 King.  Patient was admitted to ICU postprocedure for monitoring.  He had a delayed recovery from anesthesia.  Patient has a history of right MCA stroke with left-sided residual weakness.  At some point while he was still under the effect of anesthesia, he was unable to move his right lower extremity which has since resolved.  Patient still have weakness in the left upper extremity, which per ICU nurse, his sister confirmed that it is residual from his previous stroke.  Sister reported that patient lives alone but he is pretty functional given he cooks for himself, does his grocery shopping, drives and mows his yard as well.    During ICU stay, overnight, had some confusion, CT head repeated was negative.  Patient did receive IV Ativan.    Patient speech is also dysarthric from his previous stroke.  Sister confirmed that every time patient eat or drink something he coughs. That made as concern for swallow related issues and concern for aspiration as well.  Speech was consulted; following recs. Physical therapy and occupational therapy also consulted; will follow recs for DC planning. Noted to be somewhat confused but able to answer most questions. Nurse stating that this is patient's baseline. Patient is still has NG tube and is receiving TF.         Patient currently being managed for right MCA CVA with left-sided deficits, complicated by oropharyngeal dysphagia on NG tube.  Awaiting PEG tube placement but needs to hold Plavix  for 5 days. Neuro stays it is okay to hold Plavix and continue aspirin rectally or via NG tube and resume Plavix immediately after PEG tube has been placed        Interval Hx:   Patient seen and examined at bedside  Patient has no new complaints today   Vitals reviewed and stable   GI planning for PEG tube placement possibly on Monday or Tuesday     Objective/physical exam:  General: In no acute distress, afebrile  Chest:  Coarse breath sounds anteriorly, on room air  Heart: RRR, +S1, S2, no appreciable murmur  Abdomen: NGT in place; Soft, nontender, BS +  MSK: Warm, no lower extremity edema, no clubbing or cyanosis  Neurologic:  Awake and alert and oriented.  Speech has improved since I last saw him on 4/28,  Strength 4/5 in LUE/LLE & 5/5 in RUE/RLE        Assessment/Plan:  Oropharyngeal dysphagia on NG tube  s/p Coil Embolization of L AMBER Aneurysm  Prior R MCA CVA with Residual LUE/LLE Weakness, L Facial Droop  HTN, bp is borderline low   Nicotine dependence with current smoking status        Plan  Vitals reviewed and stable   He does not want the NG tube inserted yet  Okay to hold his blood pressure meds his blood pressure is within normal limits  IV Clinimix 75 cc/hour for nutritional support  Neuro stays it is okay to hold Plavix and continue aspirin rectally or via NG tube and resume Plavix immediately after PEG tube has been placed  Hold plavix  reduce metoprolol to 12.5 mg b.i.d.   PT/OT on board--> recommended snf  GI consulted for PEG tube , patient will have to be off of Plavix for 5 days so tentatively plan for PEG tube placement on Tuesday    Patient continued on ASA, Atorvastatin,  Metoprolol Tartrate, Entresto        VTE prophylaxis: SCDs     Patient condition:  Stable     Anticipated discharge and Disposition:   SNF once medically ready, probable PEG tube      Tobacco cessation counseling provided, patient is not ready to quit   Tobacco cessation time 5-10 minutes           VITAL SIGNS: 24 HRS MIN &  MAX LAST   Temp  Min: 97.4 °F (36.3 °C)  Max: 99 °F (37.2 °C) 97.4 °F (36.3 °C)   BP  Min: 122/78  Max: 135/81 124/80   Pulse  Min: 66  Max: 99  77   Resp  Min: 18  Max: 18 18   SpO2  Min: 96 %  Max: 99 % 98 %       Recent Labs   Lab 04/30/23  0121 05/02/23  0459 05/04/23  0529   WBC 9.1 9.82 8.47   RBC 5.22 5.28 5.03   HGB 15.1 15.4 14.7   HCT 44.9 45.8 44.3   MCV 86.0 86.7 88.1   MCH 28.9 29.2 29.2   MCHC 33.6 33.6 33.2   RDW 14.5 14.6 14.4    237 275   MPV 10.9* 10.5* 10.2       Recent Labs   Lab 04/30/23  0121 05/02/23 0459 05/04/23  0529    136 137   K 4.1 4.1 4.4   CO2 19* 20* 25   BUN 19.5 29.6* 19.6   CREATININE 0.71* 0.83 0.72*   CALCIUM 9.1 9.2 9.3   MG 1.90 2.10  --    ALBUMIN 3.6 3.4 3.1*   ALKPHOS 65 61 68   ALT 26 18 19   AST 26 18 19   BILITOT 1.4 1.1 0.7          Microbiology Results (last 7 days)       ** No results found for the last 168 hours. **             See below for Radiology    Scheduled Med:   aspirin  300 mg Rectal Daily    atorvastatin  10 mg Per NG tube Daily    metoprolol tartrate  12.5 mg Per NG tube BID    nicotine  1 patch Transdermal Daily    polyethylene glycol  17 g Per NG tube Daily    sacubitriL-valsartan  1 tablet Per G Tube BID    scopolamine  1 patch Transdermal Q3 Days        Continuous Infusions:   Amino acid 4.25% - dextrose 5% (CLINIMIX-E) solution (1L provides 42.5 gm AA, 50 gm CHO (170 kcal/L dextrose), Na 35, K 30, Mg 5, Ca 4.5, Acetate 70, Cl 39, Phos 15) 75 mL/hr at 05/05/23 1324        PRN Meds:  acetaminophen, enalaprilat, glycopyrrolate, hydrALAZINE, labetalol, ondansetron       Assessment/Plan:      VTE prophylaxis:     Patient condition:  Stable/Fair/Guarded/ Serious/ Critical    Anticipated discharge and Disposition:         All diagnosis and differential diagnosis have been reviewed; assessment and plan has been documented; I have personally reviewed the labs and test results that are presently available; I have reviewed the patients medication  list; I have reviewed the consulting providers response and recommendations. I have reviewed or attempted to review medical records based upon their availability    All of the patient's questions have been  addressed and answered. Patient's is agreeable to the above stated plan. I will continue to monitor closely and make adjustments to medical management as needed.  _____________________________________________________________________    Nutrition Status:    Radiology:  XR Gastric tube check, non-radiologist performed  EXAMINATION  XR GASTRIC TUBE CHECK, NON-RADIOLOGIST PERFORMED    CLINICAL HISTORY  Replaced NG;    TECHNIQUE  A total of 1 AP image(s) submitted of the partially visualized lower chest and upper abdomen.    COMPARISON  20 April 2023    FINDINGS  Lines/tubes/devices: Esophagogastric tube remains in place, slightly advanced in the interval with the radiolucent side port marker approximately 4 cm distal to the GE junction.  Multiple ECG leads overlie the chest.  ICD lead is unchanged in the interval.    The visualized cardiopulmonary structures demonstrate no new or worsening focal abnormality.  No enlarging pleural effusion or convincing pneumothorax.  There is no new focal abnormality of the included upper abdomen.  Regional osseous structures are similar.    IMPRESSION  1. Slight interval advancement of NG tube, as above.  2. No evidence of new or worsening abnormality.    Electronically signed by: Brandon Constantino  Date:    05/03/2023  Time:    13:29  Fl Modified Barium Swallow Speech  See procedure notes from Speech Pathologist.    This procedure was auto-finalized.      Kiarra Bernal MD   05/06/2023

## 2023-05-07 PROCEDURE — 97530 THERAPEUTIC ACTIVITIES: CPT | Mod: CO

## 2023-05-07 PROCEDURE — S4991 NICOTINE PATCH NONLEGEND: HCPCS | Performed by: STUDENT IN AN ORGANIZED HEALTH CARE EDUCATION/TRAINING PROGRAM

## 2023-05-07 PROCEDURE — 25000003 PHARM REV CODE 250: Performed by: INTERNAL MEDICINE

## 2023-05-07 PROCEDURE — 97535 SELF CARE MNGMENT TRAINING: CPT | Mod: CO

## 2023-05-07 PROCEDURE — 11000001 HC ACUTE MED/SURG PRIVATE ROOM

## 2023-05-07 PROCEDURE — 21400001 HC TELEMETRY ROOM

## 2023-05-07 PROCEDURE — 92526 ORAL FUNCTION THERAPY: CPT

## 2023-05-07 PROCEDURE — 25000003 PHARM REV CODE 250: Performed by: STUDENT IN AN ORGANIZED HEALTH CARE EDUCATION/TRAINING PROGRAM

## 2023-05-07 RX ADMIN — NICOTINE 1 PATCH: 21 PATCH, EXTENDED RELEASE TRANSDERMAL at 09:05

## 2023-05-07 RX ADMIN — LEUCINE, PHENYLALANINE, LYSINE, METHIONINE, ISOLEUCINE, VALINE, HISTIDINE, THREONINE, TRYPTOPHAN, ALANINE, GLYCINE, ARGININE, PROLINE, SERINE, TYROSINE, SODIUM ACETATE, DIBASIC POTASSIUM PHOSPHATE, MAGNESIUM CHLORIDE, SODIUM CHLORIDE, CALCIUM CHLORIDE, DEXTROSE
311; 238; 247; 170; 255; 247; 204; 179; 77; 880; 438; 489; 289; 213; 17; 297; 261; 51; 77; 33; 5 INJECTION INTRAVENOUS at 01:05

## 2023-05-07 NOTE — PT/OT/SLP PROGRESS
Speech Language Pathology Department  Dysphagia Therapy Progress Note    Patient Name:  Marino Mccrary   MRN:  [id number]  Admitting Diagnosis: unruptured cerebral aneurysm s/p coiling     Recommendations:     General recommendations:  dysphagia therapy  Diet recommendations:  NPO, Liquid Diet Level: NPO     Discharge recommendations:  nursing facility, skilled   Barriers to safe discharge:  level of skilled assistance needed    Subjective     Patient calm and cooperative.    Pain/Comfort:      Spiritual/Cultural/Sabianism Beliefs/Practices that affect care: no    Respiratory Status: Room air     Objective:     Oral Musculature  Dentition: edentulous, upper partial, and lower partial  Secretion Management: problems swallowing saliva  Mucosal Quality: sticky  Facial Movement: general weakness  Buccal Strength & Mobility: impaired  Mandibular Strength & Mobility: impaired  Oral Labial Strength & Mobility: impaired coordination  Lingual Strength & Mobility: impaired strength  Velar Elevation: impaired  Vocal Quality: hoarse  Volitional Cough: Able to clear secretions      Therapeutic Activities:  Pt completed laryngeal x40 with moderate cues.  Pt tolerated thermal stimulation to the anterior faucial pillars x10 with 100% swallow responses.  Laryngeal elevation delayed, fair.       Assessment:     Pt continues to present with oropharyngeal dysphagia and remains unsafe for PO diet.    Goals:     Multidisciplinary Problems       SLP Goals          Problem: SLP    Goal Priority Disciplines Outcome   SLP Goal     SLP Ongoing, Progressing   Description:   LTG: Tolerate least restrictive PO diet with no clinical signs/sx aspiration - progressing  STGs:  1.  Complete base of tongue and laryngeal strengthening exercises with minimal cues - progressing  2.  Tolerate thermal stimulation to the anterior faucial pillars with 100% effortful swallow responses and delay less than 2 seconds - progressing                       Patient  Education:     Patient provided with verbal education regarding POC.  Understanding was verbalized.    Plan:     Will continue to follow and tx as appropriate.    SLP Follow-Up:  Yes   Patient to be seen:  daily   Plan of Care expires:  05/17/23  Plan of Care reviewed with:  patient       Time Tracking:     SLP Treatment Date:    5/7/2023  Speech Start Time:   1430  Speech Stop Time:      1445  Speech Total Time (min):   15 min     Billable minutes:  Treatment of Swallow Dysfunction, 15 minutes        05/07/2023

## 2023-05-07 NOTE — PROGRESS NOTES
[facility] Medicine Progress Note      Chief Complaint: Inpatient Follow-up     HPI:   Mr Mccrary is a 63 y.o. 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 King.  Patient was admitted to ICU postprocedure for monitoring.  He had a delayed recovery from anesthesia.  Patient has a history of right MCA stroke with left-sided residual weakness.  At some point while he was still under the effect of anesthesia, he was unable to move his right lower extremity which has since resolved.  Patient still have weakness in the left upper extremity, which per ICU nurse, his sister confirmed that it is residual from his previous stroke.  Sister reported that patient lives alone but he is pretty functional given he cooks for himself, does his grocery shopping, drives and mows his yard as well.    During ICU stay, overnight, had some confusion, CT head repeated was negative.  Patient did receive IV Ativan.    Patient speech is also dysarthric from his previous stroke.  Sister confirmed that every time patient eat or drink something he coughs. That made as concern for swallow related issues and concern for aspiration as well.  Speech was consulted; following recs. Physical therapy and occupational therapy also consulted; will follow recs for DC planning. Noted to be somewhat confused but able to answer most questions. Nurse stating that this is patient's baseline. Patient is still has NG tube and is receiving TF.         Patient currently being managed for right MCA CVA with left-sided deficits, complicated by oropharyngeal dysphagia on NG tube.  Awaiting PEG tube placement but needs to hold Plavix for  5 days. Neuro stays it is okay to hold Plavix and continue aspirin rectally or via NG tube and resume Plavix immediately after PEG tube has been placed        Interval Hx:   Patient seen and examined at bedside  Patient has no new complaints today   Vitals reviewed and stable   GI planning for PEG tube placement possibly on Monday      Objective/physical exam:  General: In no acute distress, afebrile  Chest:  Coarse breath sounds anteriorly, on room air  Heart: RRR, +S1, S2, no appreciable murmur  Abdomen: NGT in place; Soft, nontender, BS +  MSK: Warm, no lower extremity edema, no clubbing or cyanosis  Neurologic:  Awake and alert and oriented.  Speech has improved since I last saw him on 4/28,  Strength 4/5 in LUE/LLE & 5/5 in RUE/RLE        Assessment/Plan:  Oropharyngeal dysphagia on NG tube  s/p Coil Embolization of L AMBER Aneurysm  Prior R MCA CVA with Residual LUE/LLE Weakness, L Facial Droop  HTN, bp is borderline low   Nicotine dependence with current smoking status        Plan  Vitals reviewed and stable   He does not want the NG tube inserted yet  Okay to hold his blood pressure meds his blood pressure is within normal limits  IV Clinimix 75 cc/hour for nutritional support  Neuro stays it is okay to hold Plavix and continue aspirin rectally or via NG tube and resume Plavix immediately after PEG tube has been placed  Hold plavix  reduce metoprolol to 12.5 mg b.i.d.   PT/OT on board--> recommended snf  GI consulted for PEG tube , patient will have to be off of Plavix for 5 days so tentatively plan for PEG tube placement on  Monday  Patient continued on ASA, Atorvastatin,  Metoprolol Tartrate, Entresto        VTE prophylaxis: SCDs     Patient condition:  Stable     Anticipated discharge and Disposition:   SNF once medically ready, probable PEG tube      Tobacco cessation counseling provided, patient is not ready to quit   Tobacco cessation time 5-10 minutes               VITAL SIGNS: 24 HRS MIN & MAX LAST    Temp  Min: 97.7 °F (36.5 °C)  Max: 98.5 °F (36.9 °C) 98.5 °F (36.9 °C)   BP  Min: 100/68  Max: 145/80 (!) 145/80   Pulse  Min: 65  Max: 91  73   Resp  Min: 16  Max: 18 18   SpO2  Min: 95 %  Max: 98 % 95 %       Recent Labs   Lab 05/02/23 0459 05/04/23  0529   WBC 9.82 8.47   RBC 5.28 5.03   HGB 15.4 14.7   HCT 45.8 44.3   MCV 86.7 88.1   MCH 29.2 29.2   MCHC 33.6 33.2   RDW 14.6 14.4    275   MPV 10.5* 10.2       Recent Labs   Lab 05/02/23 0459 05/04/23  0529    137   K 4.1 4.4   CO2 20* 25   BUN 29.6* 19.6   CREATININE 0.83 0.72*   CALCIUM 9.2 9.3   MG 2.10  --    ALBUMIN 3.4 3.1*   ALKPHOS 61 68   ALT 18 19   AST 18 19   BILITOT 1.1 0.7          Microbiology Results (last 7 days)       ** No results found for the last 168 hours. **             See below for Radiology    Scheduled Med:   aspirin  300 mg Rectal Daily    atorvastatin  10 mg Per NG tube Daily    metoprolol tartrate  12.5 mg Per NG tube BID    nicotine  1 patch Transdermal Daily    polyethylene glycol  17 g Per NG tube Daily    sacubitriL-valsartan  1 tablet Per G Tube BID    scopolamine  1 patch Transdermal Q3 Days        Continuous Infusions:       PRN Meds:  acetaminophen, enalaprilat, fentaNYL, glycopyrrolate, hydrALAZINE, HYDROmorphone, labetalol, ondansetron, sodium chloride 0.9%       Assessment/Plan:      VTE prophylaxis:     Patient condition:  Stable/Fair/Guarded/ Serious/ Critical    Anticipated discharge and Disposition:         All diagnosis and differential diagnosis have been reviewed; assessment and plan has been documented; I have personally reviewed the labs and test results that are presently available; I have reviewed the patients medication list; I have reviewed the consulting providers response and recommendations. I have reviewed or attempted to review medical records based upon their availability    All of the patient's questions have been  addressed and answered. Patient's is agreeable to the above stated plan.  I will continue to monitor closely and make adjustments to medical management as needed.  _____________________________________________________________________    Nutrition Status:    Radiology:  XR Gastric tube check, non-radiologist performed  EXAMINATION  XR GASTRIC TUBE CHECK, NON-RADIOLOGIST PERFORMED    CLINICAL HISTORY  Replaced NG;    TECHNIQUE  A total of 1 AP image(s) submitted of the partially visualized lower chest and upper abdomen.    COMPARISON  20 April 2023    FINDINGS  Lines/tubes/devices: Esophagogastric tube remains in place, slightly advanced in the interval with the radiolucent side port marker approximately 4 cm distal to the GE junction.  Multiple ECG leads overlie the chest.  ICD lead is unchanged in the interval.    The visualized cardiopulmonary structures demonstrate no new or worsening focal abnormality.  No enlarging pleural effusion or convincing pneumothorax.  There is no new focal abnormality of the included upper abdomen.  Regional osseous structures are similar.    IMPRESSION  1. Slight interval advancement of NG tube, as above.  2. No evidence of new or worsening abnormality.    Electronically signed by: Brandon Constantino  Date:    05/03/2023  Time:    13:29  Fl Modified Barium Swallow Speech  See procedure notes from Speech Pathologist.    This procedure was auto-finalized.      Kiarra Bernal MD   05/07/2023

## 2023-05-07 NOTE — PT/OT/SLP PROGRESS
"Occupational Therapy   Treatment    Name: Marino Mccrary  MRN: [id number]  Admitting Diagnosis:  <principal problem not specified>  11 Days Post-Op    Recommendations:     Discharge Recommendations: rehabilitation facility  Discharge Equipment Recommendations:  to be determined by next level of care  Barriers to discharge:  Other (Comment)    Assessment:     Marino Mccrary is a 63 y.o. male with a medical diagnosis of <principal problem not specified>.  He presents with . Performance deficits affecting function are weakness, impaired endurance, impaired self care skills, impaired functional mobility, impaired balance, decreased safety awareness, decreased lower extremity function, decreased upper extremity function, decreased coordination.     Rehab Prognosis:  Good; patient would benefit from acute skilled OT services to address these deficits and reach maximum level of function.       Plan:     Patient to be seen 3 x/week, 5 x/week to address the above listed problems via self-care/home management, therapeutic activities, therapeutic exercises  Plan of Care Expires: 05/10/23  Plan of Care Reviewed with: patient    Subjective     Pain/Comfort:  Pain Rating 1: 0/10    Objective:     Communicated with: RN prior to session.  Patient found supine with telemetry, peripheral IV, pulse ox (continuous) upon OT entry to room.    General Precautions: Standard, fall    Orthopedic Precautions:N/A  Braces: N/A  Respiratory Status: Room air    S: "Alright"    O: pt sup>EOB c SBA. Pt EOB donned L sock using RUE req VELASQUEZ and extended time. Pt donned second gown as robe c cues for hemiplegic dressing strategies and orientation to garment. Pt completed functional mob drill x 1 c RW req CGA for max safety. Pt with no LOB. Pt at sink in stance completed face hygiene c VELASQUEZ. Pt returned to sup c SBA.     A: pt tolerated well and motivated. Pt at times difficulty to understand when conversing.      P: cont c current OT " POC    Therapeutic Positioning    OT interventions performed during the course of today's session in an effort to prevent and/or reduce acquired pressure injuries:   Therapeutic positioning completed     Skin assessment:  all visible skin intact       [facility] 6 Click ADL: 18    Patient Education:  Patient provided with verbal education regarding safety awareness.  Additional teaching is warranted.      Patient left supine with all lines intact, call button in reach, and bed alarm on    GOALS:   Multidisciplinary Problems       Occupational Therapy Goals          Problem: Occupational Therapy    Goal Priority Disciplines Outcome Interventions   Occupational Therapy Goal     OT, PT/OT Ongoing, Progressing    Description: Goals to be met by: Discharge     Patient will increase functional independence with ADLs by performing:    LE Dressing with Modified [place].  Grooming while standing at sink with Modified [place].  Toileting from toilet with Modified [place] for hygiene and clothing management.   Bathing from  shower chair/bench with Modified [place].  Toilet transfer to toilet with Modified [place].                         Time Tracking:     OT Date of Treatment:    OT Start Time: 1036  OT Stop Time: 1100  OT Total Time (min): 24 min    Billable Minutes:          Number of EDVIN visits since last OT visit: 1 5/7/2023

## 2023-05-08 PROCEDURE — 97535 SELF CARE MNGMENT TRAINING: CPT | Mod: CO

## 2023-05-08 PROCEDURE — S4991 NICOTINE PATCH NONLEGEND: HCPCS | Performed by: STUDENT IN AN ORGANIZED HEALTH CARE EDUCATION/TRAINING PROGRAM

## 2023-05-08 PROCEDURE — 94761 N-INVAS EAR/PLS OXIMETRY MLT: CPT

## 2023-05-08 PROCEDURE — 97530 THERAPEUTIC ACTIVITIES: CPT

## 2023-05-08 PROCEDURE — 97530 THERAPEUTIC ACTIVITIES: CPT | Mod: CO

## 2023-05-08 PROCEDURE — 25000003 PHARM REV CODE 250: Performed by: STUDENT IN AN ORGANIZED HEALTH CARE EDUCATION/TRAINING PROGRAM

## 2023-05-08 PROCEDURE — 21400001 HC TELEMETRY ROOM

## 2023-05-08 PROCEDURE — 25000003 PHARM REV CODE 250: Performed by: INTERNAL MEDICINE

## 2023-05-08 PROCEDURE — 92526 ORAL FUNCTION THERAPY: CPT

## 2023-05-08 RX ORDER — CEFAZOLIN SODIUM 2 G/50ML
2 SOLUTION INTRAVENOUS ONCE
Status: DISCONTINUED | OUTPATIENT
Start: 2023-05-08 | End: 2023-05-08

## 2023-05-08 RX ORDER — CEFAZOLIN SODIUM 2 G/50ML
2 SOLUTION INTRAVENOUS
Status: DISCONTINUED | OUTPATIENT
Start: 2023-05-08 | End: 2023-05-12

## 2023-05-08 RX ADMIN — LEUCINE, PHENYLALANINE, LYSINE, METHIONINE, ISOLEUCINE, VALINE, HISTIDINE, THREONINE, TRYPTOPHAN, ALANINE, GLYCINE, ARGININE, PROLINE, SERINE, TYROSINE, SODIUM ACETATE, DIBASIC POTASSIUM PHOSPHATE, MAGNESIUM CHLORIDE, SODIUM CHLORIDE, CALCIUM CHLORIDE, DEXTROSE
311; 238; 247; 170; 255; 247; 204; 179; 77; 880; 438; 489; 289; 213; 17; 297; 261; 51; 77; 33; 5 INJECTION INTRAVENOUS at 07:05

## 2023-05-08 RX ADMIN — SCOPOLAMINE 1 PATCH: 1 PATCH TRANSDERMAL at 05:05

## 2023-05-08 RX ADMIN — LEUCINE, PHENYLALANINE, LYSINE, METHIONINE, ISOLEUCINE, VALINE, HISTIDINE, THREONINE, TRYPTOPHAN, ALANINE, GLYCINE, ARGININE, PROLINE, SERINE, TYROSINE, SODIUM ACETATE, DIBASIC POTASSIUM PHOSPHATE, MAGNESIUM CHLORIDE, SODIUM CHLORIDE, CALCIUM CHLORIDE, DEXTROSE
311; 238; 247; 170; 255; 247; 204; 179; 77; 880; 438; 489; 289; 213; 17; 297; 261; 51; 77; 33; 5 INJECTION INTRAVENOUS at 06:05

## 2023-05-08 RX ADMIN — NICOTINE 1 PATCH: 21 PATCH, EXTENDED RELEASE TRANSDERMAL at 08:05

## 2023-05-08 RX ADMIN — ASPIRIN 300 MG: 300 SUPPOSITORY RECTAL at 12:05

## 2023-05-08 NOTE — PT/OT/SLP PROGRESS
Speech Language Pathology Department  Dysphagia Therapy Progress Note    Patient Name:  Marino Mccrary   MRN:  [id number]  Admitting Diagnosis: unruptured cerebral aneurysm s/p coiling    Recommendations:     General recommendations:  dysphagia therapy  Diet recommendations:  NPO, Liquid Diet Level: NPO     Discharge recommendations:  nursing facility, skilled   Barriers to safe discharge:  level of skilled assistance needed    Subjective     Patient awake, alert, and cooperative.    Pain/Comfort: Pain Rating 1: 0/10    Spiritual/Cultural/Christian Beliefs/Practices that affect care: no    Respiratory Status: room air    Objective:     Oral Musculature  Dentition: edentulous  Secretion Management: problems swallowing saliva and coughing on secretions  Mucosal Quality: good  Facial Movement: WFL  Vocal Quality: strained/strangled  Volitional Cough: Weak    Therapeutic Activities:  Pt completed base of tongue and laryngeal x30 with minimal-moderate cues.  Pt tolerated thermal stimulation to the anterior faucial pillars x5 with 0 swallow responses.     Assessment:     Pt continues to present with oropharyngeal dysphagia and remains unsafe for PO intake.    Goals:     Multidisciplinary Problems       SLP Goals          Problem: SLP    Goal Priority Disciplines Outcome   SLP Goal     SLP Ongoing, Progressing   Description:   LTG: Tolerate least restrictive PO diet with no clinical signs/sx aspiration - progressing  STGs:  1.  Complete base of tongue and laryngeal strengthening exercises with minimal cues - progressing  2.  Tolerate thermal stimulation to the anterior faucial pillars with 100% effortful swallow responses and delay less than 2 seconds - progressing                       Patient Education:     Patient provided with verbal education regarding SLP POC.  Understanding was verbalized.    Plan:     Will continue to follow and tx as appropriate.    SLP Follow-Up:  Yes   Patient to be seen:  daily   Plan of Care  expires:  05/17/23  Plan of Care reviewed with:  patient       Time Tracking:     SLP Treatment Date:   05/08/23  Speech Start Time:  1500  Speech Stop Time:  1515     Speech Total Time (min):  15 min    Billable minutes:  Treatment of Swallow Dysfunction, 15 minutes        05/08/2023

## 2023-05-08 NOTE — PLAN OF CARE
05/08/23 1028   Discharge Reassessment   Assessment Type Discharge Planning Reassessment   Discharge Plan discussed with: Patient   Discharge Plan A Skilled Nursing Facility   Discharge Plan B Rehab   Post-Acute Status   Post-Acute Authorization Placement   Post-Acute Placement Status Referrals Sent  (Verbal [facility])     Attempted to reach out to patient's sister per request of patient, phone went straight to voicemail. Voicemail left.

## 2023-05-08 NOTE — PT/OT/SLP PROGRESS
Occupational Therapy   Treatment    Name: Marino Mccrary  MRN: [id number]  Admitting Diagnosis:  Unruptured cerebral aneurysm  12 Days Post-Op    Recommendations:     Discharge Recommendations: nursing facility, skilled, rehabilitation facility  Discharge Equipment Recommendations:  to be determined by next level of care  Barriers to discharge:       Assessment:     Marino Mccrary is a 63 y.o. male with a medical diagnosis of unruptured cerebral aneurysm. Performance deficits affecting function are weakness, impaired functional mobility, decreased safety awareness, impaired endurance, gait instability, impaired balance, impaired self care skills, decreased lower extremity function, decreased upper extremity function.     Rehab Prognosis:  Good; patient would benefit from acute skilled OT services to address these deficits and reach maximum level of function.       Plan:     Patient to be seen 3 x/week, 5 x/week to address the above listed problems via self-care/home management, therapeutic activities, therapeutic exercises  Plan of Care Expires: 05/10/23  Plan of Care Reviewed with: patient    Subjective     Pain/Comfort:  No c/o pain.      Objective:     Communicated with: RN prior to session.  Patient found supine with telemetry, peripheral IV, pulse ox (continuous) upon OT entry to room.    General Precautions: Standard, fall    Orthopedic Precautions:N/A  Braces: N/A  Respiratory Status: Room air  Vital Signs: Blood Pressure: 136/85     Occupational Performance:     Bed Mobility:    Patient completed Scooting/Bridging with contact guard assistance  Patient completed Supine to Sit with contact guard assistance  Patient completed Sit to Supine with contact guard assistance     Functional Mobility/Transfers:  Patient completed Sit <> Stand Transfer with minimum assistance  with  rolling walker x5 trials from EOB. Rest breaks in between 2/2 fatigue.   Functional Mobility: Min A with RW to and from bathroom.  Provided cues for walker management and overall safety.     Activities of Daily Living:  Toileting: Performed toilet t/f with CGA for safe descent/stand from low surface. Able to complete posterior pericare seated on toilet with SBA.   Grooming: completed hand hygiene and washed face standing at sink with CGA for standing balance.     Therapeutic Positioning    OT interventions performed during the course of today's session in an effort to prevent and/or reduce acquired pressure injuries:   Education on Pressure Ulcer Prevention provided    Skin assessment: all visible skin assessed   Findings: no redness or breakdown noted    [facility] 6 Click ADL: 19    Patient Education:  Patient provided with verbal education regarding OT role/goals/POC, fall prevention, and safety awareness.  Understanding was verbalized, however additional teaching warranted.      Patient left supine with all lines intact, call button in reach, and bed alarm on    GOALS:   Multidisciplinary Problems       Occupational Therapy Goals          Problem: Occupational Therapy    Goal Priority Disciplines Outcome Interventions   Occupational Therapy Goal     OT, PT/OT Ongoing, Progressing    Description: Goals to be met by: Discharge     Patient will increase functional independence with ADLs by performing:    LE Dressing with Modified [place].  Grooming while standing at sink with Modified [place].  Toileting from toilet with Modified [place] for hygiene and clothing management.   Bathing from  shower chair/bench with Modified [place].  Toilet transfer to toilet with Modified [place].                         Time Tracking:     OT Date of Treatment: 05/08/23  OT Start Time: 1403  OT Stop Time: 1426  OT Total Time (min): 23 min    Billable Minutes:Self Care/Home Management 15  Therapeutic Activity 8    OT/EDVIN: EDVIN     Number of EDVIN visits since last OT visit: 2    5/8/2023

## 2023-05-08 NOTE — PT/OT/SLP PROGRESS
Physical Therapy Treatment    Patient Name:  Marino Mccrary   MRN:  [id number]    Recommendations:     Discharge Recommendations: rehabilitation facility, nursing facility, skilled  Discharge Equipment Recommendations: walker, rolling  Barriers to discharge: Decreased caregiver support and Impaired mobility    Assessment:     Marino Mccrary is a 63 y.o. male admitted with a medical diagnosis of AMBER aneurysm coiling. He presents with the following impairments/functional limitations: weakness, impaired endurance, impaired functional mobility, gait instability, impaired balance, decreased safety awareness. Patient tolerated PT treatment well, mobilizing with Nicole for gait and dynamic gait activities. Patient is progressing towards functional PT goals.     Rehab Prognosis: Good; patient would benefit from acute skilled PT services to address these deficits and reach maximum level of function.    Recent Surgery: * No procedures listed * 12 Days Post-Op    Plan:     During this hospitalization, patient to be seen 5 x/week to address the identified rehab impairments via gait training, therapeutic activities, therapeutic exercises, neuromuscular re-education and progress toward the following goals:    Plan of Care Expires:  05/31/23    Subjective     Chief Complaint: none stated  Patient/Family Comments/goals: to get stronger  Pain/Comfort:  Pain Rating 1: 0/10      Objective:     Communicated with RN prior to session.  Patient found HOB elevated with peripheral IV, bed alarm upon PT entry to room.     General Precautions: Standard, fall  Orthopedic Precautions: N/A  Braces: N/A  Respiratory Status: Room air  Skin Integrity: Visible skin intact    Therapeutic Activities/Exercises:  Patient mobilized to EOB w/ SBA. Patient required Nicole for sit<>stand with slight posterior lean. Patient ambulated >200ft with CGA, pushing IV pole, decreased gait speed with narrow EVAN and occasional scissor step from RLE. Patient performed  side stepping x20ft each direction with Nicole, attempted ambulating on toes- required Nicole x10ft. Patient returned to sitting up in bed at conclusion of session with Nicole.    Education:  Patient provided with verbal education regarding PT POC, safety awareness.  Understanding was verbalized.     Patient left HOB elevated with all lines intact, call button in reach, and bed alarm on..    GOALS:   Multidisciplinary Problems       Physical Therapy Goals          Problem: Physical Therapy    Goal Priority Disciplines Outcome Goal Variances Interventions   Physical Therapy Goal     PT, PT/OT Ongoing, Progressing     Description: Goals to be met by: 23     Patient will increase functional independence with mobility by performin. Supine to sit with Stand-by Assistance  2. Sit to supine with Stand-by Assistance  3. Sit to stand transfer with Stand-by Assistance  4. Gait  x 150 feet with Stand-by Assistance using No Assistive Device.                          Time Tracking:     PT Received On: 23  PT Start Time: 1300     PT Stop Time: 1315  PT Total Time (min): 15 min     Billable Minutes: Therapeutic Activity 15min    Treatment Type: Treatment  PT/PTA: PT     Number of PTA visits since last PT visit: 3     2023

## 2023-05-08 NOTE — PROGRESS NOTES
"Gastroenterology Progress Note    Subjective:  Pt with no acute complaints at this time, resting comfortably in bed.     Objective:    ROS:    Review of Systems   Constitutional:  Negative for diaphoresis and malaise/fatigue.   Respiratory:  Negative for shortness of breath and wheezing.    Gastrointestinal:  Negative for abdominal pain, blood in stool, constipation, diarrhea and melena.   Psychiatric/Behavioral:  The patient is not nervous/anxious.        Vital Signs:  /85   Pulse 73   Temp 97.7 °F (36.5 °C) (Oral)   Resp 18   Ht 5' 8" (1.727 m)   Wt 74.8 kg (164 lb 14.5 oz)   SpO2 99%   BMI 25.07 kg/m²   Body mass index is 25.07 kg/m².    Physical Exam:    Physical Exam  Constitutional:       General: He is not in acute distress.  HENT:      Head: Normocephalic.   Eyes:      Extraocular Movements: Extraocular movements intact.   Cardiovascular:      Pulses: Normal pulses.   Pulmonary:      Effort: Pulmonary effort is normal.   Abdominal:      General: Abdomen is flat. Bowel sounds are normal. There is no distension.      Tenderness: There is no guarding.      Comments: Well healed scar from previous PEG noted in LUQ. Well healed vertical scar down mid abdomen s/p cardiac surgery   Skin:     General: Skin is warm and dry.   Neurological:      Mental Status: He is alert.   Psychiatric:         Mood and Affect: Mood normal.         Thought Content: Thought content normal.       Labs:  No results found for this or any previous visit (from the past 24 hour(s)).      Assessment/Plan:  63 year old male unknown to our group with Hx right MCA stroke with residual left-sided weakness, facial droop, coronary artery disease, hypertension who is s/p elective coil embolization of left AMBER aneurysm who was initially admitted to ICU for monitoring due to delayed recovery from anesthesia.   GI consulted for oropharyngeal dysphagia and PEG tube eval.      Oropharyngeal dysphagia  - patient awaiting PEG placement- must " be off of Plavix for 5 days   - Last dose of Plavix 05/04 at 8am- NPO at [facility] with plans for PEG placement tomorrow 05/09  2. S/p coli embolization of AMBER aneurysm  3. HTN     Thank you for allowing us to participate in the care of Marino Mccrary.       Geovanna Pandey PA-C  Gastroenterology  [facility]

## 2023-05-08 NOTE — PROGRESS NOTES
[facility] Medicine Progress Note      Chief Complaint: Inpatient Follow-up     HPI:   Mr Mccrary is a 63 y.o. 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 King.  Patient was admitted to ICU postprocedure for monitoring.  He had a delayed recovery from anesthesia.  Patient has a history of right MCA stroke with left-sided residual weakness.  At some point while he was still under the effect of anesthesia, he was unable to move his right lower extremity which has since resolved.  Patient still have weakness in the left upper extremity, which per ICU nurse, his sister confirmed that it is residual from his previous stroke.  Sister reported that patient lives alone but he is pretty functional given he cooks for himself, does his grocery shopping, drives and mows his yard as well.    During ICU stay, overnight, had some confusion, CT head repeated was negative.  Patient did receive IV Ativan.    Patient speech is also dysarthric from his previous stroke.  Sister confirmed that every time patient eat or drink something he coughs. That made as concern for swallow related issues and concern for aspiration as well.  Speech was consulted; following recs. Physical therapy and occupational therapy also consulted; will follow recs for DC planning. Noted to be somewhat confused but able to answer most questions. Nurse stating that this is patient's baseline. Patient is still has NG tube and is receiving TF.         Patient currently being managed for right MCA CVA with left-sided deficits, complicated by oropharyngeal dysphagia on NG tube.  Awaiting PEG tube placement but needs to hold Plavix for  5 days. Neuro stays it is okay to hold Plavix and continue aspirin rectally or via NG tube and resume Plavix immediately after PEG tube has been placed        Interval Hx:   Patient seen and examined at bedside  Patient has no new complaints today   Vitals reviewed and stable   GI planning for PEG tube placement today  Continue iv clinimix      Objective/physical exam:  General: In no acute distress, afebrile  Chest:  Coarse breath sounds anteriorly, on room air  Heart: RRR, +S1, S2, no appreciable murmur  Abdomen: NGT in place; Soft, nontender, BS +  MSK: Warm, no lower extremity edema, no clubbing or cyanosis  Neurologic:  Awake and alert and oriented.  Speech has improved since I last saw him on 4/28,  Strength 4/5 in LUE/LLE & 5/5 in RUE/RLE        Assessment/Plan:  Oropharyngeal dysphagia on NG tube  s/p Coil Embolization of L AMBER Aneurysm  Prior R MCA CVA with Residual LUE/LLE Weakness, L Facial Droop  HTN, bp is borderline low   Nicotine dependence with current smoking status        Plan  GI planning for PEG tube placement today  Continue iv clinimix  for nutritional support  Okay to hold his blood pressure meds his blood pressure is within normal limits  Neuro stays it is okay to hold Plavix and continue aspirin rectally or via NG tube and resume Plavix immediately after PEG tube has been placed  Hold plavix  reduce metoprolol to 12.5 mg b.i.d.   PT/OT on board--> recommended snf  Patient continued on ASA, Atorvastatin,  Metoprolol Tartrate, Entresto        VTE prophylaxis: SCDs     Patient condition:  Stable     Anticipated discharge and Disposition:   SNF once medically ready, probable PEG tube      Tobacco cessation counseling provided, patient is not ready to quit   Tobacco cessation time 5-10 minutes               VITAL SIGNS: 24 HRS MIN & MAX LAST   Temp  Min: 97.7 °F (36.5 °C)  Max: 98.7 °F (37.1 °C) 98 °F (36.7 °C)   BP  Min: 126/86  Max: 143/87 138/82   Pulse  Min: 66  Max: 80  76   Resp  Min: 18   Max: 18 18   SpO2  Min: 93 %  Max: 99 % 96 %       Recent Labs   Lab 05/02/23  0459 05/04/23  0529   WBC 9.82 8.47   RBC 5.28 5.03   HGB 15.4 14.7   HCT 45.8 44.3   MCV 86.7 88.1   MCH 29.2 29.2   MCHC 33.6 33.2   RDW 14.6 14.4    275   MPV 10.5* 10.2       Recent Labs   Lab 05/02/23 0459 05/04/23  0529    137   K 4.1 4.4   CO2 20* 25   BUN 29.6* 19.6   CREATININE 0.83 0.72*   CALCIUM 9.2 9.3   MG 2.10  --    ALBUMIN 3.4 3.1*   ALKPHOS 61 68   ALT 18 19   AST 18 19   BILITOT 1.1 0.7          Microbiology Results (last 7 days)       ** No results found for the last 168 hours. **             See below for Radiology    Scheduled Med:   aspirin  300 mg Rectal Daily    atorvastatin  10 mg Per NG tube Daily    metoprolol tartrate  12.5 mg Per NG tube BID    nicotine  1 patch Transdermal Daily    polyethylene glycol  17 g Per NG tube Daily    sacubitriL-valsartan  1 tablet Per G Tube BID    scopolamine  1 patch Transdermal Q3 Days        Continuous Infusions:   Amino acid 4.25% - dextrose 5% (CLINIMIX-E) solution (1L provides 42.5 gm AA, 50 gm CHO (170 kcal/L dextrose), Na 35, K 30, Mg 5, Ca 4.5, Acetate 70, Cl 39, Phos 15) 75 mL/hr at 05/08/23 0601    Amino acid 4.25% - dextrose 5% (CLINIMIX-E) solution (1L provides 42.5 gm AA, 50 gm CHO (170 kcal/L dextrose), Na 35, K 30, Mg 5, Ca 4.5, Acetate 70, Cl 39, Phos 15)          PRN Meds:  acetaminophen, enalaprilat, fentaNYL, glycopyrrolate, hydrALAZINE, HYDROmorphone, labetalol, ondansetron, sodium chloride 0.9%       Assessment/Plan:      VTE prophylaxis:     Patient condition:  Stable/Fair/Guarded/ Serious/ Critical    Anticipated discharge and Disposition:         All diagnosis and differential diagnosis have been reviewed; assessment and plan has been documented; I have personally reviewed the labs and test results that are presently available; I have reviewed the patients medication list; I have reviewed the consulting providers response and  recommendations. I have reviewed or attempted to review medical records based upon their availability    All of the patient's questions have been  addressed and answered. Patient's is agreeable to the above stated plan. I will continue to monitor closely and make adjustments to medical management as needed.  _____________________________________________________________________    Nutrition Status:    Radiology:  XR Gastric tube check, non-radiologist performed  EXAMINATION  XR GASTRIC TUBE CHECK, NON-RADIOLOGIST PERFORMED    CLINICAL HISTORY  Replaced NG;    TECHNIQUE  A total of 1 AP image(s) submitted of the partially visualized lower chest and upper abdomen.    COMPARISON  20 April 2023    FINDINGS  Lines/tubes/devices: Esophagogastric tube remains in place, slightly advanced in the interval with the radiolucent side port marker approximately 4 cm distal to the GE junction.  Multiple ECG leads overlie the chest.  ICD lead is unchanged in the interval.    The visualized cardiopulmonary structures demonstrate no new or worsening focal abnormality.  No enlarging pleural effusion or convincing pneumothorax.  There is no new focal abnormality of the included upper abdomen.  Regional osseous structures are similar.    IMPRESSION  1. Slight interval advancement of NG tube, as above.  2. No evidence of new or worsening abnormality.    Electronically signed by: Brandon Constantino  Date:    05/03/2023  Time:    13:29  Fl Modified Barium Swallow Speech  See procedure notes from Speech Pathologist.    This procedure was auto-finalized.      Kiarra Bernal MD   05/08/2023

## 2023-05-09 ENCOUNTER — ANESTHESIA (OUTPATIENT)
Dept: ENDOSCOPY | Facility: HOSPITAL | Age: 63
DRG: 026 | End: 2023-05-09
Payer: MEDICAID

## 2023-05-09 ENCOUNTER — ANESTHESIA EVENT (OUTPATIENT)
Dept: ENDOSCOPY | Facility: HOSPITAL | Age: 63
DRG: 026 | End: 2023-05-09
Payer: MEDICAID

## 2023-05-09 LAB
INR BLD: 1 (ref 0–1.3)
PROTHROMBIN TIME: 13.1 SECONDS (ref 12.5–14.5)

## 2023-05-09 PROCEDURE — D9220A PRA ANESTHESIA: ICD-10-PCS | Mod: CRNA,,, | Performed by: STUDENT IN AN ORGANIZED HEALTH CARE EDUCATION/TRAINING PROGRAM

## 2023-05-09 PROCEDURE — S4991 NICOTINE PATCH NONLEGEND: HCPCS | Performed by: STUDENT IN AN ORGANIZED HEALTH CARE EDUCATION/TRAINING PROGRAM

## 2023-05-09 PROCEDURE — 97535 SELF CARE MNGMENT TRAINING: CPT | Mod: CO

## 2023-05-09 PROCEDURE — 37000008 HC ANESTHESIA 1ST 15 MINUTES: Performed by: STUDENT IN AN ORGANIZED HEALTH CARE EDUCATION/TRAINING PROGRAM

## 2023-05-09 PROCEDURE — D9220A PRA ANESTHESIA: Mod: ANES,,, | Performed by: ANESTHESIOLOGY

## 2023-05-09 PROCEDURE — 63600175 PHARM REV CODE 636 W HCPCS: Performed by: ANESTHESIOLOGY

## 2023-05-09 PROCEDURE — 63600175 PHARM REV CODE 636 W HCPCS: Performed by: FAMILY MEDICINE

## 2023-05-09 PROCEDURE — 25000003 PHARM REV CODE 250: Performed by: INTERNAL MEDICINE

## 2023-05-09 PROCEDURE — 25000003 PHARM REV CODE 250: Performed by: STUDENT IN AN ORGANIZED HEALTH CARE EDUCATION/TRAINING PROGRAM

## 2023-05-09 PROCEDURE — 37000009 HC ANESTHESIA EA ADD 15 MINS: Performed by: STUDENT IN AN ORGANIZED HEALTH CARE EDUCATION/TRAINING PROGRAM

## 2023-05-09 PROCEDURE — D9220A PRA ANESTHESIA: ICD-10-PCS | Mod: ANES,,, | Performed by: ANESTHESIOLOGY

## 2023-05-09 PROCEDURE — 27201423 OPTIME MED/SURG SUP & DEVICES STERILE SUPPLY: Performed by: STUDENT IN AN ORGANIZED HEALTH CARE EDUCATION/TRAINING PROGRAM

## 2023-05-09 PROCEDURE — D9220A PRA ANESTHESIA: Mod: CRNA,,, | Performed by: STUDENT IN AN ORGANIZED HEALTH CARE EDUCATION/TRAINING PROGRAM

## 2023-05-09 PROCEDURE — 63600175 PHARM REV CODE 636 W HCPCS: Performed by: STUDENT IN AN ORGANIZED HEALTH CARE EDUCATION/TRAINING PROGRAM

## 2023-05-09 PROCEDURE — 43246 EGD PLACE GASTROSTOMY TUBE: CPT | Performed by: STUDENT IN AN ORGANIZED HEALTH CARE EDUCATION/TRAINING PROGRAM

## 2023-05-09 PROCEDURE — 21400001 HC TELEMETRY ROOM

## 2023-05-09 PROCEDURE — 85610 PROTHROMBIN TIME: CPT

## 2023-05-09 RX ORDER — GLYCOPYRROLATE 0.2 MG/ML
INJECTION INTRAMUSCULAR; INTRAVENOUS
Status: DISCONTINUED | OUTPATIENT
Start: 2023-05-09 | End: 2023-05-09

## 2023-05-09 RX ORDER — LIDOCAINE HYDROCHLORIDE 20 MG/ML
INJECTION INTRAVENOUS
Status: DISCONTINUED | OUTPATIENT
Start: 2023-05-09 | End: 2023-05-09

## 2023-05-09 RX ORDER — CEFAZOLIN SODIUM 1 G/3ML
INJECTION, POWDER, FOR SOLUTION INTRAMUSCULAR; INTRAVENOUS
Status: DISCONTINUED | OUTPATIENT
Start: 2023-05-09 | End: 2023-05-09

## 2023-05-09 RX ORDER — CEFAZOLIN SODIUM 1 G/3ML
INJECTION, POWDER, FOR SOLUTION INTRAMUSCULAR; INTRAVENOUS
Status: COMPLETED
Start: 2023-05-09 | End: 2023-05-09

## 2023-05-09 RX ORDER — PROPOFOL 10 MG/ML
VIAL (ML) INTRAVENOUS
Status: COMPLETED
Start: 2023-05-09 | End: 2023-05-09

## 2023-05-09 RX ORDER — ESMOLOL HYDROCHLORIDE 10 MG/ML
INJECTION INTRAVENOUS
Status: DISPENSED
Start: 2023-05-09 | End: 2023-05-10

## 2023-05-09 RX ORDER — LIDOCAINE HYDROCHLORIDE 20 MG/ML
INJECTION, SOLUTION EPIDURAL; INFILTRATION; INTRACAUDAL; PERINEURAL
Status: DISPENSED
Start: 2023-05-09 | End: 2023-05-09

## 2023-05-09 RX ORDER — ESMOLOL HYDROCHLORIDE 10 MG/ML
INJECTION INTRAVENOUS
Status: DISCONTINUED | OUTPATIENT
Start: 2023-05-09 | End: 2023-05-09

## 2023-05-09 RX ORDER — SODIUM CHLORIDE, SODIUM GLUCONATE, SODIUM ACETATE, POTASSIUM CHLORIDE AND MAGNESIUM CHLORIDE 30; 37; 368; 526; 502 MG/100ML; MG/100ML; MG/100ML; MG/100ML; MG/100ML
INJECTION, SOLUTION INTRAVENOUS CONTINUOUS
Status: CANCELLED | OUTPATIENT
Start: 2023-05-09 | End: 2023-06-08

## 2023-05-09 RX ORDER — GLYCOPYRROLATE 0.2 MG/ML
INJECTION INTRAMUSCULAR; INTRAVENOUS
Status: COMPLETED
Start: 2023-05-09 | End: 2023-05-09

## 2023-05-09 RX ORDER — PROPOFOL 10 MG/ML
VIAL (ML) INTRAVENOUS
Status: DISCONTINUED | OUTPATIENT
Start: 2023-05-09 | End: 2023-05-09

## 2023-05-09 RX ORDER — ONDANSETRON 2 MG/ML
4 INJECTION INTRAMUSCULAR; INTRAVENOUS ONCE AS NEEDED
Status: CANCELLED | OUTPATIENT
Start: 2023-05-09 | End: 2034-10-04

## 2023-05-09 RX ADMIN — NICOTINE 1 PATCH: 21 PATCH, EXTENDED RELEASE TRANSDERMAL at 02:05

## 2023-05-09 RX ADMIN — ESMOLOL HYDROCHLORIDE 10 MG: 100 INJECTION, SOLUTION INTRAVENOUS at 12:05

## 2023-05-09 RX ADMIN — SODIUM CHLORIDE, SODIUM GLUCONATE, SODIUM ACETATE, POTASSIUM CHLORIDE AND MAGNESIUM CHLORIDE: 526; 502; 368; 37; 30 INJECTION, SOLUTION INTRAVENOUS at 11:05

## 2023-05-09 RX ADMIN — PROPOFOL 20 MG: 10 INJECTION, EMULSION INTRAVENOUS at 11:05

## 2023-05-09 RX ADMIN — PROPOFOL 20 MG: 10 INJECTION, EMULSION INTRAVENOUS at 12:05

## 2023-05-09 RX ADMIN — SACUBITRIL AND VALSARTAN 1 TABLET: 24; 26 TABLET, FILM COATED ORAL at 09:05

## 2023-05-09 RX ADMIN — HYDRALAZINE HYDROCHLORIDE 10 MG: 20 INJECTION INTRAMUSCULAR; INTRAVENOUS at 01:05

## 2023-05-09 RX ADMIN — PROPOFOL 30 MG: 10 INJECTION, EMULSION INTRAVENOUS at 12:05

## 2023-05-09 RX ADMIN — GLYCOPYRROLATE 0.1 MG: 0.2 INJECTION INTRAMUSCULAR; INTRAVENOUS at 11:05

## 2023-05-09 RX ADMIN — PROPOFOL 30 MG: 10 INJECTION, EMULSION INTRAVENOUS at 11:05

## 2023-05-09 RX ADMIN — METOPROLOL TARTRATE 12.5 MG: 25 TABLET, FILM COATED ORAL at 09:05

## 2023-05-09 RX ADMIN — CEFAZOLIN 1 G: 330 INJECTION, POWDER, FOR SOLUTION INTRAMUSCULAR; INTRAVENOUS at 11:05

## 2023-05-09 RX ADMIN — HYDROMORPHONE HYDROCHLORIDE 0.2 MG: 2 INJECTION, SOLUTION INTRAMUSCULAR; INTRAVENOUS; SUBCUTANEOUS at 06:05

## 2023-05-09 RX ADMIN — LIDOCAINE HYDROCHLORIDE 60 MG: 20 INJECTION INTRAVENOUS at 11:05

## 2023-05-09 RX ADMIN — LEUCINE, PHENYLALANINE, LYSINE, METHIONINE, ISOLEUCINE, VALINE, HISTIDINE, THREONINE, TRYPTOPHAN, ALANINE, GLYCINE, ARGININE, PROLINE, SERINE, TYROSINE, SODIUM ACETATE, DIBASIC POTASSIUM PHOSPHATE, MAGNESIUM CHLORIDE, SODIUM CHLORIDE, CALCIUM CHLORIDE, DEXTROSE
311; 238; 247; 170; 255; 247; 204; 179; 77; 880; 438; 489; 289; 213; 17; 297; 261; 51; 77; 33; 5 INJECTION INTRAVENOUS at 02:05

## 2023-05-09 NOTE — TRANSFER OF CARE
"Anesthesia Transfer of Care Note    Patient: Marino Mccrary    Procedure(s) Performed: Procedure(s) (LRB):  PEG (N/A)    Patient location: GI    Anesthesia Type: general    Transport from OR: Transported from OR on room air with adequate spontaneous ventilation    Post pain: adequate analgesia    Post assessment: no apparent anesthetic complications    Post vital signs: stable    Level of consciousness: awake    Nausea/Vomiting: no nausea/vomiting    Complications: none    Transfer of care protocol was followed      Last vitals:   Visit Vitals  /86 (BP Location: Left arm, Patient Position: Lying)   Pulse 92   Temp 37 °C (98.6 °F) (Oral)   Resp 16   Ht 5' 8" (1.727 m)   Wt 74.4 kg (164 lb)   SpO2 96%   BMI 24.94 kg/m²     "

## 2023-05-09 NOTE — PROGRESS NOTES
[facility] Progress Note        Chief Complaint: Inpatient Follow-up     HPI:   Mr Mccrary is a 63 y.o. 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 King.  Patient was admitted to ICU postprocedure for monitoring.  He had a delayed recovery from anesthesia.  Patient has a history of right MCA stroke with left-sided residual weakness.  At some point while he was still under the effect of anesthesia, he was unable to move his right lower extremity which has since resolved.  Patient still have weakness in the left upper extremity, which per ICU nurse, his sister confirmed that it is residual from his previous stroke.  Sister reported that patient lives alone but he is pretty functional given he cooks for himself, does his grocery shopping, drives and mows his yard as well.    During ICU stay, overnight, had some confusion, CT head repeated was negative.  Patient did receive IV Ativan.    Patient speech is also dysarthric from his previous stroke.  Sister confirmed that every time patient eat or drink something he coughs. That made as concern for swallow related issues and concern for aspiration as well.  Speech was consulted; following recs. Physical therapy and occupational therapy also consulted; will follow recs for DC planning. Noted to be somewhat confused but able to answer most questions. Nurse stating that this is patient's baseline. Patient is still has NG tube and is receiving TF.         Patient currently being managed for right MCA CVA with left-sided deficits, complicated by oropharyngeal dysphagia on NG tube.  Awaiting PEG tube placement but needs to hold Plavix  for 5 days. Neuro stays it is okay to hold Plavix and continue aspirin rectally or via NG tube and resume Plavix immediately after PEG tube has been placed        Interval Hx:   Patient seen and examined at bedside  Vitals reviewed and stable   GI planning for PEG tube placement today       Objective/physical exam:  General: In no acute distress, afebrile  Chest:  Coarse breath sounds anteriorly, on room air  Heart: RRR, +S1, S2, no appreciable murmur  Abdomen: NGT in place; Soft, nontender, BS +  MSK: Warm, no lower extremity edema, no clubbing or cyanosis  Neurologic:  Awake and alert and oriented.  Speech has improved since I last saw him on 4/28,  Strength 4/5 in LUE/LLE & 5/5 in RUE/RLE        Assessment/Plan:  Oropharyngeal dysphagia on NG tube  s/p Coil Embolization of L AMBER Aneurysm  Prior R MCA CVA with Residual LUE/LLE Weakness, L Facial Droop  HTN, bp is borderline low   Nicotine dependence with current smoking status        Plan  GI planning for PEG tube placement today  Continue iv clinimix  for nutritional support till feed at goal  Okay to hold his blood pressure meds his blood pressure is within normal limits  Neuro stays it is okay to hold Plavix and continue aspirin rectally or via NG tube and resume Plavix immediately after PEG tube has been placed  Hold plavix  reduce metoprolol to 12.5 mg b.i.d.   PT/OT on board--> recommended snf  Patient continued on ASA, Atorvastatin,  Metoprolol Tartrate, Entresto        VTE prophylaxis: SCDs     Patient condition:  Stable     Anticipated discharge and Disposition:   SNF once medically ready, probable PEG tube      Tobacco cessation counseling provided, patient is not ready to quit   Tobacco cessation time 5-10 minutes                  VITAL SIGNS: 24 HRS MIN & MAX LAST   Temp  Min: 97.7 °F (36.5 °C)  Max: 99 °F (37.2 °C) 99 °F (37.2 °C)   BP  Min: 116/85  Max: 152/69 (!) 149/95   Pulse  Min: 66  Max: 92  79   Resp  Min: 16  Max: 24 19   SpO2  Min: 95 %   Max: 98 % 97 %       Recent Labs   Lab 05/04/23  0529   WBC 8.47   RBC 5.03   HGB 14.7   HCT 44.3   MCV 88.1   MCH 29.2   MCHC 33.2   RDW 14.4      MPV 10.2       Recent Labs   Lab 05/04/23  0529      K 4.4   CO2 25   BUN 19.6   CREATININE 0.72*   CALCIUM 9.3   ALBUMIN 3.1*   ALKPHOS 68   ALT 19   AST 19   BILITOT 0.7          Microbiology Results (last 7 days)       ** No results found for the last 168 hours. **             See below for Radiology    Scheduled Med:   aspirin  300 mg Rectal Daily    atorvastatin  10 mg Per NG tube Daily    esmoloL        LIDOcaine (PF) 20 mg/mL (2%)        metoprolol tartrate  12.5 mg Per NG tube BID    nicotine  1 patch Transdermal Daily    polyethylene glycol  17 g Per NG tube Daily    sacubitriL-valsartan  1 tablet Per G Tube BID    scopolamine  1 patch Transdermal Q3 Days        Continuous Infusions:   Amino acid 4.25% - dextrose 5% (CLINIMIX-E) solution (1L provides 42.5 gm AA, 50 gm CHO (170 kcal/L dextrose), Na 35, K 30, Mg 5, Ca 4.5, Acetate 70, Cl 39, Phos 15) 75 mL/hr at 05/08/23 1926        PRN Meds:  acetaminophen, ceFAZolin (ANCEF) IVPB, enalaprilat, fentaNYL, glycopyrrolate, hydrALAZINE, HYDROmorphone, labetalol, ondansetron, sodium chloride 0.9%       Assessment/Plan:      VTE prophylaxis:     Patient condition:  Stable/Fair/Guarded/ Serious/ Critical    Anticipated discharge and Disposition:         All diagnosis and differential diagnosis have been reviewed; assessment and plan has been documented; I have personally reviewed the labs and test results that are presently available; I have reviewed the patients medication list; I have reviewed the consulting providers response and recommendations. I have reviewed or attempted to review medical records based upon their availability    All of the patient's questions have been  addressed and answered. Patient's is agreeable to the above stated plan. I will continue to monitor closely and make adjustments to  medical management as needed.  _____________________________________________________________________    Nutrition Status:    Radiology:  XR Gastric tube check, non-radiologist performed  EXAMINATION  XR GASTRIC TUBE CHECK, NON-RADIOLOGIST PERFORMED    CLINICAL HISTORY  Replaced NG;    TECHNIQUE  A total of 1 AP image(s) submitted of the partially visualized lower chest and upper abdomen.    COMPARISON  20 April 2023    FINDINGS  Lines/tubes/devices: Esophagogastric tube remains in place, slightly advanced in the interval with the radiolucent side port marker approximately 4 cm distal to the GE junction.  Multiple ECG leads overlie the chest.  ICD lead is unchanged in the interval.    The visualized cardiopulmonary structures demonstrate no new or worsening focal abnormality.  No enlarging pleural effusion or convincing pneumothorax.  There is no new focal abnormality of the included upper abdomen.  Regional osseous structures are similar.    IMPRESSION  1. Slight interval advancement of NG tube, as above.  2. No evidence of new or worsening abnormality.    Electronically signed by: Brandon Constantino  Date:    05/03/2023  Time:    13:29  Fl Modified Barium Swallow Speech  See procedure notes from Speech Pathologist.    This procedure was auto-finalized.      Kiarra Bernal MD   05/09/2023

## 2023-05-09 NOTE — PLAN OF CARE
Spoke to Vandana with [facility], they are not in network with [employer]. Called patient's sister, Tisha, she will call back with second option.

## 2023-05-09 NOTE — PROVATION PATIENT INSTRUCTIONS
Discharge Summary/Instructions after an Endoscopic Procedure  Patient Name: Marino Mccrary  Patient MRN: [id number]  Patient YOB: 1960  Tuesday, May 9, 2023  Nader Ryan MD  Dear patient,  As a result of recent federal legislation (The Federal Cures Act), you may   receive lab or pathology results from your procedure in your [facility]   account before your physician is able to contact you. Your physician or   their representative will relay the results to you with their   recommendations at their soonest availability.  Thank you,  RESTRICTIONS:  During your procedure today, you received medications for sedation.  These   medications may affect your judgment, balance and coordination.  Therefore,   for 24 hours, you have the following restrictions:   - DO NOT drive a car, operate machinery, make legal/financial decisions,   sign important papers or drink alcohol.    ACTIVITY:  Today: no heavy lifting, straining or running due to procedural   sedation/anesthesia.  The following day: return to full activity including work.  DIET:  Eat and drink normally unless instructed otherwise.     TREATMENT FOR COMMON SIDE EFFECTS:  - Mild abdominal pain, nausea, belching, bloating or excessive gas:  rest,   eat lightly and use a heating pad.  - Sore Throat: treat with throat lozenges and/or gargle with warm salt   water.  - Because air was used during the procedure, expelling large amounts of air   from your rectum or belching is normal.  - If a bowel prep was taken, you may not have a bowel movement for 1-3 days.    This is normal.  SYMPTOMS TO WATCH FOR AND REPORT TO YOUR PHYSICIAN:  1. Abdominal pain or bloating, other than gas cramps.  2. Chest pain.  3. Back pain.  4. Signs of infection such as: chills or fever occurring within 24 hours   after the procedure.  5. Rectal bleeding, which would show as bright red, maroon, or black stools.   (A tablespoon of blood from the rectum is not serious, especially if    hemorrhoids are present.)  6. Vomiting.  7. Weakness or dizziness.  GO DIRECTLY TO THE NEAREST EMERGENCY ROOM IF YOU HAVE ANY OF THE FOLLOWING:      Difficulty breathing              Chills and/or fever over 101 F   Persistent vomiting and/or vomiting blood   Severe abdominal pain   Severe chest pain   Black, tarry stools   Bleeding- more than one tablespoon   Any other symptom or condition that you feel may need urgent attention  Your doctor recommends these additional instructions:  If any biopsies were taken, your doctors clinic will contact you in 1 to 2   weeks with any results.  - Return patient to hospital [facility] for ongoing care.   - Resume previous diet.   - Continue present medications.   - Resume Plavix (clopidogrel) at prior dose tomorrow.   - Please follow the post-PEG recommendations including: Nutrition consult   for formula and volume, advance food and medications per primary care   provider, external bolster snug to abdominal wall, change dressing once per   day, change dressing on top of bumper daily, NPO x4 hrs then water today,   may use PEG tomorrow for feedings and clean site with soap and water daily   and dry thoroughly.  For questions, problems or results please call your physician - Nader Ryan MD at Work:  [phone].  [facility], EMERGENCY ROOM PHONE NUMBER: [phone]  IF A COMPLICATION OR EMERGENCY SITUATION ARISES AND YOU ARE UNABLE TO REACH   YOUR PHYSICIAN - GO DIRECTLY TO THE EMERGENCY ROOM.  MD Nader Uribe MD  5/9/2023 12:26:30 PM  This report has been verified and signed electronically.  Dear patient,  As a result of recent federal legislation (The Federal Cures Act), you may   receive lab or pathology results from your procedure in your [facility]   account before your physician is able to contact you. Your physician or   their representative will relay the results to you with their   recommendations at their soonest availability.  Thank  you,  PROVATION

## 2023-05-09 NOTE — PROGRESS NOTES
05/09/23 1428   Missed Time Reason   SLP Attempted Eval Date 05/09/23   SLP Attempted Eval Time 1415   Missed Treatment Reason Off the floor for procedure/surgery

## 2023-05-09 NOTE — PT/OT/SLP PROGRESS
Physical Therapy      Patient Name:  Marino Mccrary   MRN:  [id number]    Patient not seen today secondary to Other (Comment) (Patient leaving floor at this time for PEG placement, will follow up as schedule permits.).

## 2023-05-09 NOTE — PLAN OF CARE
Referral sent via fax since [facility] has not yet responded in [facility]. Awaiting response at this time.

## 2023-05-09 NOTE — PLAN OF CARE
Problem: Adult Inpatient Plan of Care  Goal: Plan of Care Review  Outcome: Ongoing, Progressing  Goal: Patient-Specific Goal (Individualized)  Outcome: Ongoing, Progressing  Goal: Absence of Hospital-Acquired Illness or Injury  Outcome: Ongoing, Progressing  Intervention: Identify and Manage Fall Risk  Flowsheets (Taken 5/9/2023 1630)  Safety Promotion/Fall Prevention:   assistive device/personal item within reach   side rails raised x 2   nonskid shoes/socks when out of bed  Intervention: Prevent Skin Injury  Flowsheets (Taken 5/9/2023 1630)  Body Position: position changed independently  Intervention: Prevent and Manage VTE (Venous Thromboembolism) Risk  Flowsheets (Taken 5/9/2023 1630)  Activity Management: Ambulated in room - L4  VTE Prevention/Management: bleeding precations maintained  Range of Motion: active ROM (range of motion) encouraged  Intervention: Prevent Infection  Flowsheets (Taken 5/9/2023 1630)  Infection Prevention:   hand hygiene promoted   single patient room provided  Goal: Optimal Comfort and Wellbeing  Outcome: Ongoing, Progressing  Intervention: Provide Person-Centered Care  Flowsheets (Taken 5/9/2023 1630)  Trust Relationship/Rapport:   care explained   questions encouraged   questions answered  Goal: Readiness for Transition of Care  Outcome: Ongoing, Progressing

## 2023-05-09 NOTE — ANESTHESIA PREPROCEDURE EVALUATION
05/09/2023  Marino Mccrary is a 63 y.o., male with dysphagia.  Pre-operative evaluation for Procedure(s) (LRB):  PEG (N/A)    Marino Mccrary is a 63 y.o. male  Anterior Cerebral aneurysm, infarct  s/p IR clipping 4/2023. No high-grade arterial stenosis or occlusion in the head or neck    Patient Active Problem List   Diagnosis    Acute ischemic right MCA stroke    Unruptured cerebral aneurysm       Review of patient's allergies indicates:  No Known Allergies    No current facility-administered medications on file prior to encounter.     Current Outpatient Medications on File Prior to Encounter   Medication Sig Dispense Refill    aspirin (ECOTRIN) 81 MG EC tablet Take 81 mg by mouth once daily.      atorvastatin (LIPITOR) 10 MG tablet Take 10 mg by mouth once daily.      clopidogreL (PLAVIX) 75 mg tablet Take 75 mg by mouth once daily.      metoprolol succinate (TOPROL-XL) 100 MG 24 hr tablet Take 100 mg by mouth.      QUEtiapine (SEROQUEL) 100 MG Tab Take 100 mg by mouth nightly.      sacubitriL-valsartan (ENTRESTO) 24-26 mg per tablet Take 1 tablet by mouth 2 (two) times daily.      spironolactone (ALDACTONE) 25 MG tablet Take 25 mg by mouth once daily.      zolpidem (AMBIEN) 5 MG Tab Take 5 mg by mouth nightly as needed.      pantoprazole (PROTONIX) 40 MG tablet Take 1 tablet (40 mg total) by mouth once daily. 60 tablet 0    scopolamine (TRANSDERM-SCOP) 1.3-1.5 mg (1 mg over 3 days) Place 1 patch onto the skin every 72 hours.         Past Surgical History:   Procedure Laterality Date    BACK SURGERY      CARDIAC SURGERY      INSERT / REPLACE / REMOVE PACEMAKER Left     KNEE SURGERY         Social History     Socioeconomic History    Marital status:    Tobacco Use    Smoking status: Every Day     Packs/day: 1.00     Types: Cigarettes    Smokeless tobacco: Never   Substance  and Sexual Activity    Alcohol use: Yes     Comment: 1/2 pint of whiskey every month    Drug use: Never         CBC: No results for input(s): WBC, RBC, HGB, HCT, PLT, MCV, MCH, MCHC in the last 72 hours.    CMP: No results for input(s): NA, K, CL, CO2, BUN, CREATININE, GLU, MG, PHOS, CALCIUM, ALBUMIN, PROT, ALKPHOS, ALT, AST, BILITOT in the last 72 hours.    INR  Recent Labs     05/09/23  0424   INR 1.00   PROTIME 13.1           Diagnostic Studies:  CXR 4/29/2023 : No acute interval change. ICD. Egatric tube    Ct Head 4/27/2023: old infarct seen in the MCA distribution on the right and left side.  These are stable since prior examination.  There is aneurysm clip in the region of the anterior cerebral arteries.  This is also stable since prior examination.  There is some cerebral atrophy seen.  There is some compensatory ventricular dilatation and periventricular white matter changes consistent with the patient's age.  Calvarium is intact.  The posterior fossa is unremarkable  EKG :            2D Echo :  No results found for this or any previous visit..      Pre-op Assessment    I have reviewed the Patient Summary Reports.     I have reviewed the Nursing Notes. I have reviewed the NPO Status.   I have reviewed the Medications.     Review of Systems  Anesthesia Hx:  No problems with previous Anesthesia  History of prior surgery of interest to airway management or planning: heart surgery. Previous anesthesia: General 4/26/2023 IR intracranial embolization: Mil2x1,Gr1,ET7.5; 2003 CABGx v: ; Lumbar Sx:; PCM in and replacement with general anesthesia.  Procedure performed at an [facility]. Airway issues documented on chart review include mask, easy, GETA, easy direct laryngoscopy , view on direct laryngoscopy Grade I  Denies Family Hx of Anesthesia complications.   Denies Personal Hx of Anesthesia complications.   Social:  Smoker, Alcohol Use 1PPD x 45 yrs.  ETOH: 1 pint q monthly.   Cardiovascular:   Exercise  tolerance: poor Pacemaker Hypertension Past MI (MIx2, last one approx 2003) CAD  CABG/stent (CABG 2003)   Denies Angina.  Denies CHF. hyperlipidemia CAD, 2003 CABG; PCM few yrs ago.   Pulmonary:   Denies COPD.  Denies Asthma.    Renal/:   Denies Chronic Renal Disease. no renal calculi     Hepatic/GI:   GERD Denies Liver Disease. Denies Hepatitis.    Musculoskeletal:   Lumbar Sx Spine Disorders: lumbar    Neurological:   CVA Denies Seizures. Rt MCA ischemic stroke, with Left arm weakness, some left leg weakness, speech deficit L ant cerebral aneurysm s/p IR embolization 4/26/2023.  No high-grade arterial stenosis or occlusion in the head or neck.   Endocrine:   Denies Diabetes. Denies Hypothyroidism. Denies Hyperthyroidism.  Denies Obesity / BMI > 30      Physical Exam  General: Cooperative, Alert, Flat Affect and Oriented  faciaol hair beard  Airway:  Mallampati: III / III  Mouth Opening: Normal  TM Distance: Normal  Tongue: Normal  Neck ROM: Normal ROM    Dental:  Edentulous, Dentures        Anesthesia Plan  Type of Anesthesia, risks & benefits discussed:    Anesthesia Type: Gen Natural Airway  Intra-op Monitoring Plan: Standard ASA Monitors  Induction:  IV  Informed Consent: Informed consent signed with the Patient and all parties understand the risks and agree with anesthesia plan.  All questions answered.   ASA Score: 4  Day of Surgery Review of History & Physical: H&P Update referred to the surgeon/provider.I have interviewed and examined the patient. I have reviewed the patient's H&P dated: 4/26/2023.   Anesthesia Plan Notes: TIVA with mask/NC, GA back-up.     Ready For Surgery From Anesthesia Perspective.     .

## 2023-05-09 NOTE — PT/OT/SLP PROGRESS
Occupational Therapy   Treatment    Name: Marino Mccrary  MRN: [id number]  Admitting Diagnosis:  Unruptured cerebral aneurysm  13 Days Post-Op    Recommendations:     Discharge Recommendations: rehabilitation facility, nursing facility, skilled  Discharge Equipment Recommendations:  to be determined by next level of care  Barriers to discharge:       Assessment:     Marino Mccrary is a 63 y.o. male with a medical diagnosis of unruptured cerebral aneurysm. Performance deficits affecting function are weakness, impaired endurance, impaired self care skills, impaired functional mobility, impaired balance, gait instability, decreased safety awareness.     Rehab Prognosis:  Good; patient would benefit from acute skilled OT services to address these deficits and reach maximum level of function.       Plan:     Patient to be seen 3 x/week, 5 x/week to address the above listed problems via self-care/home management, therapeutic activities, therapeutic exercises  Plan of Care Expires: 05/10/23  Plan of Care Reviewed with: patient    Subjective     Pain/Comfort:   No c/o pain.     Objective:     Communicated with: RN prior to session.  Patient found supine with peripheral IV, telemetry, PEG Tube upon OT entry to room.    General Precautions: Standard, fall    Orthopedic Precautions:N/A  Braces: N/A  Respiratory Status: Room air     Occupational Performance:     Bed Mobility:    Patient completed Supine to Sit with minimum assistance  Patient completed Sit to Supine with minimum assistance     Functional Mobility/Transfers:  Patient completed sit>stand t/f with Min A and RW.   Functional Mobility: Min A with RW to and from bathroom. Slightly unsteady but no major LOB noted.      Activities of Daily Living:  Toileting: performed toilet t/f with CGA. Able to perform seated posterior pericare.   Grooming: completed oral hygiene using suction kit seated EOB with set-up A.   LE dressing: able to adjust socks seated EOB in figure  4 position.     Therapeutic Positioning    OT interventions performed during the course of today's session in an effort to prevent and/or reduce acquired pressure injuries:   Education on Pressure Ulcer Prevention provided    Skin assessment: all visible skin assessed   Findings: no redness or breakdown noted    [facility] 6 Click ADL: 20    Patient Education:  Patient provided with verbal education regarding fall prevention, safety awareness, and pressure ulcer prevention.  Understanding was verbalized.      Patient left supine with all lines intact, call button in reach, bed alarm on, and RN present    GOALS:   Multidisciplinary Problems       Occupational Therapy Goals          Problem: Occupational Therapy    Goal Priority Disciplines Outcome Interventions   Occupational Therapy Goal     OT, PT/OT Ongoing, Progressing    Description: Goals to be met by: Discharge     Patient will increase functional independence with ADLs by performing:    LE Dressing with Modified [place].  Grooming while standing at sink with Modified [place].  Toileting from toilet with Modified [place] for hygiene and clothing management.   Bathing from  shower chair/bench with Modified [place].  Toilet transfer to toilet with Modified [place].                         Time Tracking:     OT Date of Treatment: 05/09/23  OT Start Time: 1414  OT Stop Time: 1425  OT Total Time (min): 11 min    Billable Minutes:Self Care/Home Management 11    OT/EDVIN: EDVIN     Number of EDVIN visits since last OT visit: 3    5/9/2023

## 2023-05-09 NOTE — ANESTHESIA POSTPROCEDURE EVALUATION
Anesthesia Post Evaluation    Patient: Marino Mccrary    Procedure(s) Performed: Procedure(s) (LRB):  PEG (N/A)    Final Anesthesia Type: general (-TIVA w Natural AW)      Patient location during evaluation: GI PACU  Patient participation: Yes- Able to Participate  Level of consciousness: awake and alert, awake and oriented  Post-procedure vital signs: reviewed and stable  Pain management: adequate  Airway patency: patent    PONV status at discharge: No PONV  Anesthetic complications: no      Cardiovascular status: blood pressure returned to baseline, hemodynamically stable and stable  Respiratory status: unassisted, spontaneous ventilation and room air  Hydration status: euvolemic  Follow-up not needed.          Vitals Value Taken Time   /95 05/09/23 1346   Temp 37.2 °C (99 °F) 05/09/23 1346   Pulse 79 05/09/23 1346   Resp 19 05/09/23 1252   SpO2 97 % 05/09/23 1252         No case tracking events are documented in the log.      Pain/Paresh Score: Paresh Score: 9 (5/9/2023 12:26 PM)

## 2023-05-10 LAB
ANION GAP SERPL CALC-SCNC: 10 MEQ/L
BASOPHILS # BLD AUTO: 0.02 X10(3)/MCL
BASOPHILS NFR BLD AUTO: 0.2 %
BUN SERPL-MCNC: 15.7 MG/DL (ref 8.4–25.7)
CALCIUM SERPL-MCNC: 9.2 MG/DL (ref 8.8–10)
CHLORIDE SERPL-SCNC: 105 MMOL/L (ref 98–107)
CO2 SERPL-SCNC: 20 MMOL/L (ref 23–31)
CREAT SERPL-MCNC: 0.75 MG/DL (ref 0.73–1.18)
CREAT/UREA NIT SERPL: 21
EOSINOPHIL # BLD AUTO: 0.03 X10(3)/MCL (ref 0–0.9)
EOSINOPHIL NFR BLD AUTO: 0.3 %
ERYTHROCYTE [DISTWIDTH] IN BLOOD BY AUTOMATED COUNT: 13.7 % (ref 11.5–17)
GFR SERPLBLD CREATININE-BSD FMLA CKD-EPI: >60 MLS/MIN/1.73/M2
GLUCOSE SERPL-MCNC: 117 MG/DL (ref 82–115)
HCT VFR BLD AUTO: 45.7 % (ref 42–52)
HGB BLD-MCNC: 15.2 G/DL (ref 14–18)
IMM GRANULOCYTES # BLD AUTO: 0.07 X10(3)/MCL (ref 0–0.04)
IMM GRANULOCYTES NFR BLD AUTO: 0.6 %
LYMPHOCYTES # BLD AUTO: 1.49 X10(3)/MCL (ref 0.6–4.6)
LYMPHOCYTES NFR BLD AUTO: 13.6 %
MAGNESIUM SERPL-MCNC: 2.1 MG/DL (ref 1.6–2.6)
MCH RBC QN AUTO: 28.8 PG (ref 27–31)
MCHC RBC AUTO-ENTMCNC: 33.3 G/DL (ref 33–36)
MCV RBC AUTO: 86.6 FL (ref 80–94)
MONOCYTES # BLD AUTO: 0.96 X10(3)/MCL (ref 0.1–1.3)
MONOCYTES NFR BLD AUTO: 8.7 %
NEUTROPHILS # BLD AUTO: 8.41 X10(3)/MCL (ref 2.1–9.2)
NEUTROPHILS NFR BLD AUTO: 76.6 %
NRBC BLD AUTO-RTO: 0 %
PLATELET # BLD AUTO: 317 X10(3)/MCL (ref 130–400)
PMV BLD AUTO: 9.8 FL (ref 7.4–10.4)
POC ACTIVATED CLOTTING TIME K: 131 SEC (ref 74–137)
POC ACTIVATED CLOTTING TIME K: 173 SEC (ref 74–137)
POTASSIUM SERPL-SCNC: 4.5 MMOL/L (ref 3.5–5.1)
RBC # BLD AUTO: 5.28 X10(6)/MCL (ref 4.7–6.1)
SAMPLE: ABNORMAL
SAMPLE: NORMAL
SODIUM SERPL-SCNC: 135 MMOL/L (ref 136–145)
WBC # SPEC AUTO: 10.98 X10(3)/MCL (ref 4.5–11.5)

## 2023-05-10 PROCEDURE — 85025 COMPLETE CBC W/AUTO DIFF WBC: CPT | Performed by: NURSE PRACTITIONER

## 2023-05-10 PROCEDURE — 80048 BASIC METABOLIC PNL TOTAL CA: CPT | Performed by: NURSE PRACTITIONER

## 2023-05-10 PROCEDURE — 25000003 PHARM REV CODE 250: Performed by: NURSE PRACTITIONER

## 2023-05-10 PROCEDURE — 21400001 HC TELEMETRY ROOM

## 2023-05-10 PROCEDURE — 25000003 PHARM REV CODE 250: Performed by: STUDENT IN AN ORGANIZED HEALTH CARE EDUCATION/TRAINING PROGRAM

## 2023-05-10 PROCEDURE — 83735 ASSAY OF MAGNESIUM: CPT | Performed by: NURSE PRACTITIONER

## 2023-05-10 PROCEDURE — 25000003 PHARM REV CODE 250: Performed by: INTERNAL MEDICINE

## 2023-05-10 PROCEDURE — 97116 GAIT TRAINING THERAPY: CPT | Mod: CQ

## 2023-05-10 PROCEDURE — S4991 NICOTINE PATCH NONLEGEND: HCPCS | Performed by: STUDENT IN AN ORGANIZED HEALTH CARE EDUCATION/TRAINING PROGRAM

## 2023-05-10 PROCEDURE — 97535 SELF CARE MNGMENT TRAINING: CPT | Mod: CO

## 2023-05-10 PROCEDURE — 25000003 PHARM REV CODE 250: Performed by: ANESTHESIOLOGY

## 2023-05-10 PROCEDURE — 97530 THERAPEUTIC ACTIVITIES: CPT | Mod: CO

## 2023-05-10 RX ORDER — CLOPIDOGREL BISULFATE 75 MG/1
75 TABLET ORAL DAILY
Status: DISCONTINUED | OUTPATIENT
Start: 2023-05-10 | End: 2023-05-15 | Stop reason: HOSPADM

## 2023-05-10 RX ADMIN — ACETAMINOPHEN 650 MG: 325 TABLET ORAL at 04:05

## 2023-05-10 RX ADMIN — CLOPIDOGREL BISULFATE 75 MG: 75 TABLET ORAL at 09:05

## 2023-05-10 RX ADMIN — LEUCINE, PHENYLALANINE, LYSINE, METHIONINE, ISOLEUCINE, VALINE, HISTIDINE, THREONINE, TRYPTOPHAN, ALANINE, GLYCINE, ARGININE, PROLINE, SERINE, TYROSINE, SODIUM ACETATE, DIBASIC POTASSIUM PHOSPHATE, MAGNESIUM CHLORIDE, SODIUM CHLORIDE, CALCIUM CHLORIDE, DEXTROSE
311; 238; 247; 170; 255; 247; 204; 179; 77; 880; 438; 489; 289; 213; 17; 297; 261; 51; 77; 33; 5 INJECTION INTRAVENOUS at 08:05

## 2023-05-10 RX ADMIN — ASPIRIN 300 MG: 300 SUPPOSITORY RECTAL at 09:05

## 2023-05-10 RX ADMIN — LEUCINE, PHENYLALANINE, LYSINE, METHIONINE, ISOLEUCINE, VALINE, HISTIDINE, THREONINE, TRYPTOPHAN, ALANINE, GLYCINE, ARGININE, PROLINE, SERINE, TYROSINE, SODIUM ACETATE, DIBASIC POTASSIUM PHOSPHATE, MAGNESIUM CHLORIDE, SODIUM CHLORIDE, CALCIUM CHLORIDE, DEXTROSE
311; 238; 247; 170; 255; 247; 204; 179; 77; 880; 438; 489; 289; 213; 17; 297; 261; 51; 77; 33; 5 INJECTION INTRAVENOUS at 04:05

## 2023-05-10 RX ADMIN — NICOTINE 1 PATCH: 21 PATCH, EXTENDED RELEASE TRANSDERMAL at 09:05

## 2023-05-10 RX ADMIN — ACETAMINOPHEN 650 MG: 325 TABLET ORAL at 09:05

## 2023-05-10 RX ADMIN — POLYETHYLENE GLYCOL 3350 17 G: 17 POWDER, FOR SOLUTION ORAL at 09:05

## 2023-05-10 RX ADMIN — LEUCINE, PHENYLALANINE, LYSINE, METHIONINE, ISOLEUCINE, VALINE, HISTIDINE, THREONINE, TRYPTOPHAN, ALANINE, GLYCINE, ARGININE, PROLINE, SERINE, TYROSINE, SODIUM ACETATE, DIBASIC POTASSIUM PHOSPHATE, MAGNESIUM CHLORIDE, SODIUM CHLORIDE, CALCIUM CHLORIDE, DEXTROSE
311; 238; 247; 170; 255; 247; 204; 179; 77; 880; 438; 489; 289; 213; 17; 297; 261; 51; 77; 33; 5 INJECTION INTRAVENOUS at 07:05

## 2023-05-10 RX ADMIN — ATORVASTATIN CALCIUM 10 MG: 10 TABLET, FILM COATED ORAL at 09:05

## 2023-05-10 NOTE — PROGRESS NOTES
Inpatient Nutrition Assessment    Admit Date: 4/26/2023   Total duration of encounter: 14 days     Nutrition Recommendation/Prescription     TF recommendations: Fibersource HN  @ 75 mL/hr with 100mL free water q4hrs.   Kcal: 1800 kcal/day (~99% est kcal needs)  Protein: 81 g/day (~90% est protein needs)  Fluid: 1815 mL (~100% est fluid needs)    2. Continue PPN until pt tolerating tube feeding; Clinimix 4.25/5% @ 75ml/hr (612kcal, 77gm protein, 1800ml fluid)    3. Monitor tolerance, weight, and labs      Communication of Recommendations: reviewed with nurse and reviewed with patient    Nutrition Assessment     Malnutrition Assessment/Nutrition-Focused Physical Exam    Malnutrition in the context of acute illness or injury  Degree of Malnutrition: unable to complete  Energy Intake: unable to obtain  Interpretation of Weight Loss: >7.5% in 3 months (per EMR)  Body Fat:does not meet criteria  Area of Body Fat Loss: does not meet criteria  Muscle Mass Loss: does not meet criteria  Area of Muscle Mass Loss: does not meet criteria  Fluid Accumulation: unable to obtain  Edema: unable to obtain   Reduced  Strength: unable to obtain  A minimum of two characteristics is recommended for diagnosis of either severe or non-severe malnutrition.    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  History of CVA to the right MCA with residual left-sided upper and lower extremity weakness and left-sided facial droop  Hypertension    Relevant Medical History:    Acid reflux      Cerebral aneurysm      Heart disease      Hypertension      Mixed hyperlipidemia        Nutrition-Related Medications: miralax  Calorie Containing IV Medications: Clinimix     Nutrition-Related Labs:  4/29/2023: Total  "Bili: 1.6 and Glucose 93  : Crea-0.71  2023: Crea 0.72, Alb 3.1, Gluc 126  5/10: Na 135, Glu 117    Diet/PN Order: Diet NPO  Amino acid 4.25% - dextrose 5% (CLINIMIX-E) solution (1L provides 42.5 gm AA, 50 gm CHO (170 kcal/L dextrose), Na 35, K 30, Mg 5, Ca 4.5, Acetate 70, Cl 39, Phos 15)  Oral Supplement Order: none  Tube Feeding Order:  Fibersource HN @ 75 mL/hr (see below for calculation)   Appetite/Oral Intake: NPO/NPO  Factors Affecting Nutritional Intake: NPO  Food/Jehovah's witness/Cultural Preferences: unable to obtain  Food Allergies: unable to obtain       Wound(s):   none noted.    Comments    2023: Pt unable to answer questions. Per EMR, pt weighed 83.9 kg on 2023 (11% wt loss in 3 months, significant). Provided TF recommendations of Peptamen @ 75 mL/hr. Pt currently has Peptamen @ 10 mL/hr running, no issues noted thus far per nurse. Will monitor.  23: Pt out of ICU now. Continues on tube feeds. Will change Tube feed formula and adjust nutritional needs accordingly.   2023: Pt expecting PEG placement either  or . TF on hold at this time due to accidental NGT removal per pt. Clinimix now ordered, was not hanging at time of visit. Pt denies N/V. Last BM documented as 2023. Will monitor.  5/10:  pt had PEG placed yesterday; plan to continue with current formula; PPN running; nurse reports good tolerance    Anthropometrics    Height: 5' 7.99" (172.7 cm) Height Method: Stated  Last Weight: 74.4 kg (164 lb) (05/10/23 0713) Weight Method: Bed Scale  BMI (Calculated): 24.9  BMI Classification: overweight (BMI 25-29.9)        Ideal Body Weight (IBW), Male: 153.94 lb     % Ideal Body Weight, Male (lb): 106.54 %                 Usual Body Weight (UBW), k.9 kg  % Usual Body Weight: 89.34     Usual Weight Provided By: EMR weight history    Wt Readings from Last 5 Encounters:   05/10/23 74.4 kg (164 lb)   23 77 kg (169 lb 12.1 oz)   23 83.9 kg (185 lb)   23 83.9 kg " (185 lb)     Weight Change(s) Since Admission:  Admit Weight: 74.8 kg (164 lb 14.5 oz) (04/26/23 1026)  4/26/2023: 74.8 kg  5/1: no new wt  5/5/2023: no new wts logged  5/10: 74.4kg    Estimated Needs    Weight Used For Calorie Calculations: 74.8 kg (164 lb 14.5 oz)  Energy Calorie Requirements (kcal): 3170-8773 (MSJ x 1.2-1.3)  Energy Need Method: Ellsworth-St Jeor  Weight Used For Protein Calculations: 74.8 kg (164 lb 14.5 oz)  Protein Requirements:  (1.2-2.0 g/kg)  Fluid Requirements (mL): 1816 (1 mL/kcal)           Enteral Nutrition    Evaluation of Received Nutrient/Fluid Intake     Formula: Fibersource HN  Rate/Volume: 30ml/hr  Water Flushes: 100ml every 4h  Additives/Modulars: none at this time  Route: PEG tube  Method: continuous  Total Nutrition Provided by Tube Feeding, Additives, and Flushes:  Calories Provided  720 kcal/d, 40% needs   Protein Provided  32 g/d, 36% needs   Fluid Provided  600 ml/d, 33% needs   Continuous feeding calculations based on estimated 20 hr/d run time unless otherwise stated.     Parenteral Nutrition    Standard Formula: Clinimix E 4.25/5  Custom Formula: not applicable  Additives: none  Rate/Volume: 75ml/hr  Lipids: none  Total Nutrition Provided by Parenteral Nutrition:  Calories Provided  612 kcal/d, 33% needs   Protein Provided  77 g/d, 86% needs   Dextrose Provided  90 g/d   Fluid Provided  1800 ml/d, 99% needs       Evaluation of Received Nutrient Intake    Calories: not meeting estimated needs  Protein: meeting estimated needs    Patient Education    Not applicable.    Nutrition Diagnosis     PES: Increased nutrient needs related to acute illness as evidenced by increased need for kcal and protein. (continues)    Interventions/Goals     Intervention(s): modified composition of enteral nutrition  Goal: Tolerate enteral feeding at goal rate by follow-up. (goal progressing)    Monitoring & Evaluation     Dietitian will monitor enteral nutrition intake, weight,  electrolyte/renal panel, glucose/endocrine profile, and gastrointestinal profile.  Nutrition Risk/Follow-Up: moderate (follow-up in 3-5 days)   Please consult if re-assessment needed sooner.

## 2023-05-10 NOTE — PT/OT/SLP PROGRESS
Physical Therapy Treatment    Patient Name:  Marino Mccrary   MRN:  [id number]    Recommendations:     Discharge Recommendations: nursing facility, skilled  Discharge Equipment Recommendations: walker, rolling  Barriers to discharge: Decreased caregiver support, Impaired mobility, and Ongoing medical needs    Assessment:     Marino Mccrary is a 63 y.o. male admitted with a medical diagnosis of <principal problem not specified>.  He presents with the following impairments/functional limitations: weakness, impaired endurance, decreased safety awareness, impaired balance, impaired cognition, impaired cardiopulmonary response to activity, impaired self care skills, impaired functional mobility, gait instability, decreased lower extremity function .    Rehab Prognosis: Good; patient would benefit from acute skilled PT services to address these deficits and reach maximum level of function.    Recent Surgery: * No procedures listed * 14 Days Post-Op    Plan:     During this hospitalization, patient to be seen 5 x/week to address the identified rehab impairments via gait training, therapeutic activities and progress toward the following goals:    Plan of Care Expires:  05/31/23    Subjective     Chief Complaint:   Patient/Family Comments/goals:   Pain/Comfort:         Objective:     Communicated with nurse prior to session.  Patient found HOB elevated with telemetry, PEG Tube, pulse ox (continuous), PICC line upon PT entry to room.     General Precautions: Standard, fall  Orthopedic Precautions: N/A  Braces: N/A  Respiratory Status: Room air  Blood Pressure:   Skin Integrity: Visible skin intact      Functional Mobility:  Bed Mobility:     Supine to Sit: contact guard assistance  Sit to Supine: contact guard assistance  Transfers:     Sit to Stand:  minimum assistance with no AD  Gait: pt amb x100ft with no AD and Nicole for balance with cues for increased hip flexion     Therapeutic Activities/Exercises:  Pt performed  standing lateral steps with UE support and emphasis on exaggerated step length to increase balance.     Education:  Patient provided with verbal education regarding safety awareness.  Understanding was verbalized, however additional teaching warranted.     Patient left HOB elevated with all lines intact, call button in reach, and bed alarm on..    GOALS:   Multidisciplinary Problems       Physical Therapy Goals          Problem: Physical Therapy    Goal Priority Disciplines Outcome Goal Variances Interventions   Physical Therapy Goal     PT, PT/OT Ongoing, Progressing     Description: Goals to be met by: 23     Patient will increase functional independence with mobility by performin. Supine to sit with Stand-by Assistance  2. Sit to supine with Stand-by Assistance  3. Sit to stand transfer with Stand-by Assistance  4. Gait  x 150 feet with Stand-by Assistance using No Assistive Device.                          Time Tracking:     PT Received On: 05/10/23  PT Start Time: 1434     PT Stop Time: 1448  PT Total Time (min): 14 min     Billable Minutes: Gait Training 14    Treatment Type: Treatment  PT/PTA: PTA     Number of PTA visits since last PT visit: 4     05/10/2023

## 2023-05-10 NOTE — PROGRESS NOTES
[facility] Medicine Progress Note         Chief Complaint: Inpatient Follow-up     HPI:   Mr Mccrary is a 63 y.o. 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 King.  Patient was admitted to ICU postprocedure for monitoring.  He had a delayed recovery from anesthesia.  Patient has a history of right MCA stroke with left-sided residual weakness.  At some point while he was still under the effect of anesthesia, he was unable to move his right lower extremity which has since resolved.  Patient still have weakness in the left upper extremity, which per ICU nurse, his sister confirmed that it is residual from his previous stroke.  Sister reported that patient lives alone but he is pretty functional given he cooks for himself, does his grocery shopping, drives and mows his yard as well.    During ICU stay, overnight, had some confusion, CT head repeated was negative.  Patient did receive IV Ativan.    Patient speech is also dysarthric from his previous stroke.  Sister confirmed that every time patient eat or drink something he coughs. That made as concern for swallow related issues and concern for aspiration as well.  Speech was consulted; following recs. Physical therapy and occupational therapy also consulted; will follow recs for DC planning. Noted to be somewhat confused but able to answer most questions. Nurse stating that this is patient's baseline. Patient is still has NG tube and is receiving TF.         Patient currently being managed for right MCA CVA with left-sided deficits, complicated by oropharyngeal dysphagia on NG tube.  Awaiting PEG tube placement but needs to hold Plavix  for 5 days. Neuro stays it is okay to hold Plavix and continue aspirin rectally or via NG tube and resume Plavix immediately after PEG tube has been placed.  GI placed a PEG tube on 05/09.  No complications .  Plavix has been resumed.  patient currently awaiting placement        Interval Hx:   Patient seen and examined at bedside  GI placed a PEG tube on 05/09.  No complications .  Plavix has been resumed.  patient currently awaiting placement        Objective/physical exam:  General: In no acute distress, afebrile  Chest:  Coarse breath sounds anteriorly, on room air  Heart: RRR, +S1, S2, no appreciable murmur  Abdomen: NGT in place; Soft, nontender, BS +  MSK: Warm, no lower extremity edema, no clubbing or cyanosis  Neurologic:  Awake and alert and oriented.  Speech has improved since I last saw him on 4/28,  Strength 4/5 in LUE/LLE & 5/5 in RUE/RLE        Assessment/Plan:  Oropharyngeal dysphagia  s/p peg tube 5/9  s/p Coil Embolization of L AMBER Aneurysm  Prior R MCA CVA with Residual LUE/LLE Weakness, L Facial Droop  HTN, bp is borderline low   Nicotine dependence with current smoking status        Plan  GI placed a PEG tube on 05/09.  No complications .  Plavix has been resumed.  patient currently awaiting placement  Continue iv clinimix  for nutritional support till feed at goal  Bp meds on hold- bp wnl  PT/OT on board--> recommended snf  Patient continued on ASA, plavix, Atorvastatin        VTE prophylaxis: SCDs     Patient condition:  Stable     Anticipated discharge and Disposition:   awaits SNF , medically ready,     Tobacco cessation counseling provided, patient is not ready to quit   Tobacco cessation time 5-10 minutes       VITAL SIGNS: 24 HRS MIN & MAX LAST   Temp  Min: 98.4 °F (36.9 °C)  Max: 99.2 °F (37.3 °C) 98.4 °F (36.9 °C)   BP  Min: 106/64  Max: 146/83 117/73   Pulse  Min: 74  Max: 92  75   Resp  Min: 18  Max: 18 (P) 18   SpO2  Min: 96 %  Max: 99 % 97 %       Recent Labs   Lab 05/04/23  0550  05/10/23  0432   WBC 8.47 10.98   RBC 5.03 5.28   HGB 14.7 15.2   HCT 44.3 45.7   MCV 88.1 86.6   MCH 29.2 28.8   MCHC 33.2 33.3   RDW 14.4 13.7    317   MPV 10.2 9.8       Recent Labs   Lab 05/04/23  0529 05/10/23  0432    135*   K 4.4 4.5   CO2 25 20*   BUN 19.6 15.7   CREATININE 0.72* 0.75   CALCIUM 9.3 9.2   MG  --  2.10   ALBUMIN 3.1*  --    ALKPHOS 68  --    ALT 19  --    AST 19  --    BILITOT 0.7  --           Microbiology Results (last 7 days)       ** No results found for the last 168 hours. **             See below for Radiology    Scheduled Med:   aspirin  300 mg Rectal Daily    atorvastatin  10 mg Per NG tube Daily    clopidogreL  75 mg Per G Tube Daily    nicotine  1 patch Transdermal Daily    polyethylene glycol  17 g Per NG tube Daily    scopolamine  1 patch Transdermal Q3 Days        Continuous Infusions:   Amino acid 4.25% - dextrose 5% (CLINIMIX-E) solution (1L provides 42.5 gm AA, 50 gm CHO (170 kcal/L dextrose), Na 35, K 30, Mg 5, Ca 4.5, Acetate 70, Cl 39, Phos 15) 75 mL/hr at 05/10/23 0450        PRN Meds:  acetaminophen, ceFAZolin (ANCEF) IVPB, enalaprilat, fentaNYL, glycopyrrolate, hydrALAZINE, HYDROmorphone, labetalol, ondansetron, sodium chloride 0.9%       Assessment/Plan:      VTE prophylaxis:     Patient condition:  Stable/Fair/Guarded/ Serious/ Critical    Anticipated discharge and Disposition:         All diagnosis and differential diagnosis have been reviewed; assessment and plan has been documented; I have personally reviewed the labs and test results that are presently available; I have reviewed the patients medication list; I have reviewed the consulting providers response and recommendations. I have reviewed or attempted to review medical records based upon their availability    All of the patient's questions have been  addressed and answered. Patient's is agreeable to the above stated plan. I will continue to monitor closely and make adjustments to medical management  as needed.  _____________________________________________________________________    Nutrition Status:    Radiology:  XR Gastric tube check, non-radiologist performed  EXAMINATION  XR GASTRIC TUBE CHECK, NON-RADIOLOGIST PERFORMED    CLINICAL HISTORY  Replaced NG;    TECHNIQUE  A total of 1 AP image(s) submitted of the partially visualized lower chest and upper abdomen.    COMPARISON  20 April 2023    FINDINGS  Lines/tubes/devices: Esophagogastric tube remains in place, slightly advanced in the interval with the radiolucent side port marker approximately 4 cm distal to the GE junction.  Multiple ECG leads overlie the chest.  ICD lead is unchanged in the interval.    The visualized cardiopulmonary structures demonstrate no new or worsening focal abnormality.  No enlarging pleural effusion or convincing pneumothorax.  There is no new focal abnormality of the included upper abdomen.  Regional osseous structures are similar.    IMPRESSION  1. Slight interval advancement of NG tube, as above.  2. No evidence of new or worsening abnormality.    Electronically signed by: Brandon Constantino  Date:    05/03/2023  Time:    13:29  Fl Modified Barium Swallow Speech  See procedure notes from Speech Pathologist.    This procedure was auto-finalized.      Kiarra Bernal MD   05/10/2023

## 2023-05-10 NOTE — PT/OT/SLP PROGRESS
SLP attempted to see pt x2 for dysphagia therapy; however, pt attending to his ADLs and then working with PT services. Will re-attempt at later time schedule permitting.

## 2023-05-10 NOTE — PT/OT/SLP PROGRESS
Occupational Therapy   Treatment    Name: Marino Mccrary  MRN: [id number]  Admitting Diagnosis:  Unruptured cerebral aneurysm  14 Days Post-Op    Recommendations:     Discharge Recommendations: rehabilitation facility, nursing facility, skilled  Discharge Equipment Recommendations:  to be determined by next level of care  Barriers to discharge:       Assessment:     Marino Mccrary is a 63 y.o. male with a medical diagnosis of unruptured cerebral aneurysm. Performance deficits affecting function are weakness, decreased upper extremity function, impaired endurance, gait instability, impaired balance, impaired self care skills, impaired functional mobility, decreased lower extremity function, decreased safety awareness, impaired coordination. Would benefit from rehab vs SNF placement to improve overall strength, endurance and independence with ADLs.     Rehab Prognosis:  Good; patient would benefit from acute skilled OT services to address these deficits and reach maximum level of function.       Plan:     Patient to be seen 3 x/week, 5 x/week to address the above listed problems via self-care/home management, therapeutic activities, therapeutic exercises  Plan of Care Expires: 05/10/23  Plan of Care Reviewed with: patient    Subjective     Pain/Comfort:  No c/o pain.      Objective:     Communicated with: RN prior to session.  Patient found supine with telemetry, PEG Tube, pulse ox (continuous), PICC line, bed alarm upon OT entry to room.    General Precautions: Standard, fall    Orthopedic Precautions:N/A  Braces: N/A  Respiratory Status: Room air  Vital Signs: Blood Pressure: 110/69     Occupational Performance:     Bed Mobility:    Patient completed Supine to Sit with minimum assistance  Patient completed Sit to Supine with minimum assistance     Functional Mobility/Transfers:  Patient completed Sit <> Stand Transfer with minimum assistance  with  rolling walker   Functional Mobility: Min A with RW to and from  bathroom. Unsteady but no LOB.     Activities of Daily Living:  Toileting: performed toilet t/f with CGA. Able to perform seated posterior pericare.     Therapeutic Activities:  Instructed pt in FM ax with resistive clips for L hand strengthening. Demo'd limited ability to  and manipulate objects in L hand, but able to use R hand to assist in task.    Therapeutic Positioning    OT interventions performed during the course of today's session in an effort to prevent and/or reduce acquired pressure injuries:   Education on Pressure Ulcer Prevention provided    Skin assessment: all visible skin assessed   Findings: no redness or breakdown noted    [facility] 6 Click ADL: 19    Patient Education:  Patient provided with verbal education regarding OT role/goals/POC, fall prevention, and safety awareness.  Understanding was verbalized, however additional teaching warranted.      Patient left supine with all lines intact, call button in reach, and bed alarm on    GOALS:   Multidisciplinary Problems       Occupational Therapy Goals          Problem: Occupational Therapy    Goal Priority Disciplines Outcome Interventions   Occupational Therapy Goal     OT, PT/OT Ongoing, Progressing    Description: Goals to be met by: Discharge     Patient will increase functional independence with ADLs by performing:    LE Dressing with Modified [place].  Grooming while standing at sink with Modified [place].  Toileting from toilet with Modified [place] for hygiene and clothing management.   Bathing from  shower chair/bench with Modified [place].  Toilet transfer to toilet with Modified [place].                         Time Tracking:     OT Date of Treatment: 05/10/23  OT Start Time: 1327  OT Stop Time: 1353  OT Total Time (min): 26 min    Billable Minutes:Self Care/Home Management 10  Therapeutic Activity 16    OT/EDVIN: EDVIN     Number of EDVIN visits since last OT visit: 3    5/10/2023

## 2023-05-10 NOTE — PLAN OF CARE
Referral sent to [facility] per sister's request via voicemail. Awaiting response at this time.      Spoke to Sarah with [facility] regarding patient's referral to [facility]. She stated that the patient has been clinically accepted to their facility and they are ready to submit for insurance authorization. I sent the necessary updates, the requested tube feeding settings, as well as the [id number]. Awaiting auth at this time.

## 2023-05-11 PROCEDURE — 25000003 PHARM REV CODE 250: Performed by: STUDENT IN AN ORGANIZED HEALTH CARE EDUCATION/TRAINING PROGRAM

## 2023-05-11 PROCEDURE — 25000003 PHARM REV CODE 250: Performed by: INTERNAL MEDICINE

## 2023-05-11 PROCEDURE — 25000003 PHARM REV CODE 250: Performed by: ANESTHESIOLOGY

## 2023-05-11 PROCEDURE — 97530 THERAPEUTIC ACTIVITIES: CPT | Mod: CQ

## 2023-05-11 PROCEDURE — 97535 SELF CARE MNGMENT TRAINING: CPT | Mod: CO

## 2023-05-11 PROCEDURE — 94761 N-INVAS EAR/PLS OXIMETRY MLT: CPT

## 2023-05-11 PROCEDURE — 97116 GAIT TRAINING THERAPY: CPT | Mod: CQ

## 2023-05-11 PROCEDURE — 25000003 PHARM REV CODE 250: Performed by: NURSE PRACTITIONER

## 2023-05-11 PROCEDURE — 92526 ORAL FUNCTION THERAPY: CPT

## 2023-05-11 PROCEDURE — S4991 NICOTINE PATCH NONLEGEND: HCPCS | Performed by: STUDENT IN AN ORGANIZED HEALTH CARE EDUCATION/TRAINING PROGRAM

## 2023-05-11 PROCEDURE — 21400001 HC TELEMETRY ROOM

## 2023-05-11 RX ORDER — FAMOTIDINE 10 MG/ML
20 INJECTION INTRAVENOUS ONCE
Status: COMPLETED | OUTPATIENT
Start: 2023-05-11 | End: 2023-05-11

## 2023-05-11 RX ADMIN — CLOPIDOGREL BISULFATE 75 MG: 75 TABLET ORAL at 09:05

## 2023-05-11 RX ADMIN — ASPIRIN 300 MG: 300 SUPPOSITORY RECTAL at 09:05

## 2023-05-11 RX ADMIN — ATORVASTATIN CALCIUM 10 MG: 10 TABLET, FILM COATED ORAL at 09:05

## 2023-05-11 RX ADMIN — POLYETHYLENE GLYCOL 3350 17 G: 17 POWDER, FOR SOLUTION ORAL at 09:05

## 2023-05-11 RX ADMIN — FAMOTIDINE 20 MG: 10 INJECTION, SOLUTION INTRAVENOUS at 01:05

## 2023-05-11 RX ADMIN — SCOPOLAMINE 1 PATCH: 1 PATCH TRANSDERMAL at 06:05

## 2023-05-11 RX ADMIN — ACETAMINOPHEN 650 MG: 325 TABLET ORAL at 06:05

## 2023-05-11 RX ADMIN — LEUCINE, PHENYLALANINE, LYSINE, METHIONINE, ISOLEUCINE, VALINE, HISTIDINE, THREONINE, TRYPTOPHAN, ALANINE, GLYCINE, ARGININE, PROLINE, SERINE, TYROSINE, SODIUM ACETATE, DIBASIC POTASSIUM PHOSPHATE, MAGNESIUM CHLORIDE, SODIUM CHLORIDE, CALCIUM CHLORIDE, DEXTROSE
311; 238; 247; 170; 255; 247; 204; 179; 77; 880; 438; 489; 289; 213; 17; 297; 261; 51; 77; 33; 5 INJECTION INTRAVENOUS at 06:05

## 2023-05-11 RX ADMIN — NICOTINE 1 PATCH: 21 PATCH, EXTENDED RELEASE TRANSDERMAL at 11:05

## 2023-05-11 NOTE — PROGRESS NOTES
[facility] Progress Note        Chief Complaint: Inpatient Follow-up for  acute stroke     HPI: Mr Mccrary is a 63 y.o. 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 King.  Patient was admitted to ICU postprocedure for monitoring.  He had a delayed recovery from anesthesia.  Patient has a history of right MCA stroke with left-sided residual weakness.  At some point while he was still under the effect of anesthesia, he was unable to move his right lower extremity which has since resolved.  Patient still have weakness in the left upper extremity, which per ICU nurse, his sister confirmed that it is residual from his previous stroke.  Sister reported that patient lives alone but he is pretty functional given he cooks for himself, does his grocery shopping, drives and mows his yard as well.    During ICU stay, overnight, had some confusion, CT head repeated was negative.  Patient did receive IV Ativan.    Patient speech is also dysarthric from his previous stroke.  Sister confirmed that every time patient eat or drink something he coughs. That made as concern for swallow related issues and concern for aspiration as well.  Speech was consulted; following recs. Physical therapy and occupational therapy also consulted; will follow recs for DC planning. Noted to be somewhat confused but able to answer most questions. Nurse stating that this is patient's baseline. Patient is still has NG tube and is receiving TF.   Patient currently being managed for right MCA CVA with left-sided deficits, complicated by oropharyngeal dysphagia on NG tube.  Awaiting PEG tube placement but needs to hold  Plavix for 5 days. Neuro stays it is okay to hold Plavix and continue aspirin rectally or via NG tube and resume Plavix immediately after PEG tube has been placed.  GI placed a PEG tube on 05/09.  No complications .  Plavix has been resumed.  patient currently awaiting placement    Interval Hx:   Sleeping comfortably, easily awoke.  Denies any complaints.  Tube feeds are going through his PEG tube right now.  No family is at bedside  Case discussed with patient nurse and  on the floor    Objective/physical exam:  General: In no acute distress, afebrile  Chest:  Coarse breath sounds anteriorly, on room air  Heart: RRR, +S1, S2, no appreciable murmur  Abdomen: NGT in place; Soft, nontender, BS +  MSK: Warm, no lower extremity edema, no clubbing or cyanosis  Neurologic:  Awake and alert and oriented.  Speech has improved since I last saw him on 4/28,  Strength 4/5 in LUE/LLE & 5/5 in RUE/RLE    VITAL SIGNS: 24 HRS MIN & MAX LAST   Temp  Min: 97.8 °F (36.6 °C)  Max: 99.1 °F (37.3 °C) 98.5 °F (36.9 °C)   BP  Min: 106/64  Max: 136/78 110/70   Pulse  Min: 59  Max: 81  80   Resp  Min: 16  Max: 18 16   SpO2  Min: 96 %  Max: 97 % 96 %       Recent Labs   Lab 05/10/23  0432   WBC 10.98   RBC 5.28   HGB 15.2   HCT 45.7   MCV 86.6   MCH 28.8   MCHC 33.3   RDW 13.7      MPV 9.8       Recent Labs   Lab 05/10/23  0432   *   K 4.5   CO2 20*   BUN 15.7   CREATININE 0.75   CALCIUM 9.2   MG 2.10          Microbiology Results (last 7 days)       ** No results found for the last 168 hours. **             See below for Radiology    Scheduled Med:   aspirin  300 mg Rectal Daily    atorvastatin  10 mg Per NG tube Daily    clopidogreL  75 mg Per G Tube Daily    nicotine  1 patch Transdermal Daily    polyethylene glycol  17 g Per NG tube Daily    scopolamine  1 patch Transdermal Q3 Days        Continuous Infusions:   Amino acid 4.25% - dextrose 5% (CLINIMIX-E) solution (1L provides 42.5 gm AA, 50 gm CHO (170 kcal/L  dextrose), Na 35, K 30, Mg 5, Ca 4.5, Acetate 70, Cl 39, Phos 15) 75 mL/hr at 05/11/23 0627        PRN Meds:  acetaminophen, ceFAZolin (ANCEF) IVPB, enalaprilat, fentaNYL, glycopyrrolate, hydrALAZINE, HYDROmorphone, labetalol, ondansetron, sodium chloride 0.9%       Assessment/Plan:  Oropharyngeal dysphagia  s/p peg tube 5/9  s/p Coil Embolization of L AMBER Aneurysm  Prior R MCA CVA with Residual LUE/LLE Weakness, L Facial Droop  HTN, bp is borderline low   Nicotine dependence with current smoking status        S/P PEG tube placement 05/09.  No complications .  Plavix has been resumed.  patient currently awaiting placement  Discontinue Clinimix once tube feeds are at goal  Bp meds on hold- bp wnl  PT/OT on board--> recommended snf,  working on it  Patient continued on ASA, plavix, Atorvastatin  Labs stable as of 05/10/2023, repeat Saturday     VTE prophylaxis: SCDs     Patient condition:  Stable     Anticipated discharge and Disposition:   awaits SNF , medically stable to transfer        All diagnosis and differential diagnosis have been reviewed; assessment and plan has been documented; I have personally reviewed the labs and test results that are presently available; I have reviewed the patients medication list; I have reviewed the consulting providers response and recommendations. I have reviewed or attempted to review medical records based upon their availability    All of the patient's questions have been  addressed and answered. Patient's is agreeable to the above stated plan. I will continue to monitor closely and make adjustments to medical management as needed.  _____________________________________________________________________    Nutrition Status:    Radiology:  XR Gastric tube check, non-radiologist performed  EXAMINATION  XR GASTRIC TUBE CHECK, NON-RADIOLOGIST PERFORMED    CLINICAL HISTORY  Replaced NG;    TECHNIQUE  A total of 1 AP image(s) submitted of the partially visualized lower chest  and upper abdomen.    COMPARISON  20 April 2023    FINDINGS  Lines/tubes/devices: Esophagogastric tube remains in place, slightly advanced in the interval with the radiolucent side port marker approximately 4 cm distal to the GE junction.  Multiple ECG leads overlie the chest.  ICD lead is unchanged in the interval.    The visualized cardiopulmonary structures demonstrate no new or worsening focal abnormality.  No enlarging pleural effusion or convincing pneumothorax.  There is no new focal abnormality of the included upper abdomen.  Regional osseous structures are similar.    IMPRESSION  1. Slight interval advancement of NG tube, as above.  2. No evidence of new or worsening abnormality.    Electronically signed by: Brandon Constantino  Date:    05/03/2023  Time:    13:29  Fl Modified Barium Swallow Speech  See procedure notes from Speech Pathologist.    This procedure was auto-finalized.      Kobe Antoine MD  Department of Hospital Medicine   [facility]   05/11/2023

## 2023-05-11 NOTE — PT/OT/SLP PROGRESS
Occupational Therapy   Treatment    Name: Marino Mccrary  MRN: [id number]  Admitting Diagnosis:  Unruptured cerebral aneurysm  15 Days Post-Op    Recommendations:     Discharge Recommendations: nursing facility, skilled  Discharge Equipment Recommendations:  to be determined by next level of care  Barriers to discharge:       Assessment:     Marino Mccrary is a 63 y.o. male with a medical diagnosis of unruptured cerebral aneurysm. Performance deficits affecting function are weakness, impaired endurance, impaired functional mobility, decreased safety awareness, decreased lower extremity function, decreased upper extremity function, impaired self care skills, impaired balance, gait instability.     Rehab Prognosis:  Good; patient would benefit from acute skilled OT services to address these deficits and reach maximum level of function.       Plan:     Patient to be seen 3 x/week, 5 x/week to address the above listed problems via self-care/home management, therapeutic activities, therapeutic exercises  Plan of Care Expires: 05/10/23  Plan of Care Reviewed with: patient    Subjective     Pain/Comfort:  Pain Rating 1: 0/10    Objective:     Communicated with: RN prior to session.  Patient found ambulatory in room/pfeiffer with PTA with telemetry, PEG Tube, PICC line, pulse ox (continuous) upon OT entry to room.    General Precautions: Standard, fall    Orthopedic Precautions:N/A  Braces: N/A  Respiratory Status: Room air     Occupational Performance:     Bed Mobility:    Patient completed Sit to Supine with stand by assistance     Functional Mobility/Transfers:  Patient completed Sit <> Stand Transfer with contact guard assistance  with  no assistive device   Functional Mobility: Min A with no AD to and from bathroom. Pt unsteady but no major LOB.     Activities of Daily Living:  Toileting: performed toilet t/f 2x with CGA for safety. Able to perform seated pericare.   UE dressing: donned/doffed gown with Min A. Cues for  marisol technique.  LE dressing: Mod A to don/doff B socks 2/2 limited use of LUE.     Therapeutic Activities:  Provided pt with blue sponge for L hand strengthening. Educated on use. Good carryover noted.     Therapeutic Positioning    OT interventions performed during the course of today's session in an effort to prevent and/or reduce acquired pressure injuries:   Education on Pressure Ulcer Prevention provided    Skin assessment: full body skin assessment was performed    Findings: no redness or breakdown noted    [facility] 6 Click ADL: 20    Patient Education:  Patient provided with verbal education regarding OT role/goals/POC, fall prevention, safety awareness, and pressure ulcer prevention.  Understanding was verbalized.      Patient left supine with all lines intact, call button in reach, and bed alarm on    GOALS:   Multidisciplinary Problems       Occupational Therapy Goals          Problem: Occupational Therapy    Goal Priority Disciplines Outcome Interventions   Occupational Therapy Goal     OT, PT/OT Ongoing, Progressing    Description: Goals to be met by: Discharge     Patient will increase functional independence with ADLs by performing:    LE Dressing with Modified Friona.  Grooming while standing at sink with Modified Friona.  Toileting from toilet with Modified Friona for hygiene and clothing management.   Bathing from  shower chair/bench with Modified Friona.  Toilet transfer to toilet with Modified Friona.                         Time Tracking:     OT Date of Treatment: 05/11/23  OT Start Time: 1408  OT Stop Time: 1431  OT Total Time (min): 23 min    Billable Minutes:Self Care/Home Management 23    OT/EDVIN: EDVIN     Number of EDVIN visits since last OT visit: 4    5/11/2023

## 2023-05-11 NOTE — PT/OT/SLP PROGRESS
Physical Therapy Treatment    Patient Name:  Marino Mccrary   MRN:  [id number]    Recommendations:     Discharge Recommendations: nursing facility, skilled  Discharge Equipment Recommendations: walker, rolling  Barriers to discharge: Impaired mobility    Assessment:     Marino Mccrary is a 63 y.o. male admitted with a medical diagnosis of <principal problem not specified>.  He presents with the following impairments/functional limitations: weakness, impaired endurance, decreased safety awareness, impaired balance, impaired cognition, impaired cardiopulmonary response to activity, impaired self care skills, impaired functional mobility, gait instability, decreased lower extremity function .    Rehab Prognosis: Good; patient would benefit from acute skilled PT services to address these deficits and reach maximum level of function.    Recent Surgery: * No procedures listed * 15 Days Post-Op    Plan:     During this hospitalization, patient to be seen 5 x/week to address the identified rehab impairments via gait training, therapeutic activities and progress toward the following goals:    Plan of Care Expires:  05/31/23    Subjective     Chief Complaint:   Patient/Family Comments/goals:   Pain/Comfort:         Objective:     Communicated with nurse prior to session.  Patient found HOB elevated with telemetry, PEG Tube, PICC line, pulse ox (continuous) upon PT entry to room.     General Precautions: Standard, fall  Orthopedic Precautions: N/A  Braces: N/A  Respiratory Status: Room air  Blood Pressure:   Skin Integrity: Visible skin intact      Functional Mobility:  Bed Mobility:     Supine to Sit: minimum assistance  Transfers:     Sit to Stand:  minimum assistance with rolling walker  Gait: Pt amb for 200ft with out AD and Nicole with 2 bouts of unsteadiness    Therapeutic Activities/Exercises:  Pt performed standing hip flexion, hip abd/add and calf raises with BUE support x10 reps    Education:  Patient provided with  verbal education regarding safety awareness.  Understanding was verbalized, however additional teaching warranted.     Patient left up in chair with all lines intact, call button in reach, and LONDONO present..    GOALS:   Multidisciplinary Problems       Physical Therapy Goals          Problem: Physical Therapy    Goal Priority Disciplines Outcome Goal Variances Interventions   Physical Therapy Goal     PT, PT/OT Ongoing, Progressing     Description: Goals to be met by: 23     Patient will increase functional independence with mobility by performin. Supine to sit with Stand-by Assistance  2. Sit to supine with Stand-by Assistance  3. Sit to stand transfer with Stand-by Assistance  4. Gait  x 150 feet with Stand-by Assistance using No Assistive Device.                          Time Tracking:     PT Received On: 23  PT Start Time: 1352     PT Stop Time: 1415  PT Total Time (min): 23 min     Billable Minutes: Gait Training 13 and Therapeutic Activity 10    Treatment Type: Treatment  PT/PTA: PTA     Number of PTA visits since last PT visit: 2023

## 2023-05-11 NOTE — PT/OT/SLP PROGRESS
Speech Language Pathology Department  Dysphagia Therapy Progress Note    Patient Name:  Marino Mccrary   MRN:  [id number]  Admitting Diagnosis: unruptured cerebral aneurysm s/p coiling    Recommendations:     General recommendations:  dysphagia therapy  Diet recommendations:  NPO, Liquid Diet Level: NPO     Discharge recommendations:  nursing facility, skilled   Barriers to safe discharge:  level of skilled assistance needed    Subjective     Patient awake, alert, and cooperative.    Pain/Comfort:      Spiritual/Cultural/Sikh Beliefs/Practices that affect care: no    Respiratory Status: room air    Objective:     Oral Musculature  Dentition: edentulous  Secretion Management: problems swallowing saliva and coughing on secretions  Mucosal Quality: good  Facial Movement: WFL  Vocal Quality: strained/strangled  Volitional Cough: Weak    Therapeutic Activities:  Pt completed base of tongue and laryngeal x30 with minimal-moderate cues.  Pt tolerated thermal stimulation to the anterior faucial pillars x15 with x1 swallow responses.     Assessment:     Pt continues to present with oropharyngeal dysphagia and remains unsafe for PO intake.    Goals:     Multidisciplinary Problems       SLP Goals          Problem: SLP    Goal Priority Disciplines Outcome   SLP Goal     SLP Ongoing, Progressing   Description:   LTG: Tolerate least restrictive PO diet with no clinical signs/sx aspiration - progressing  STGs:  1.  Complete base of tongue and laryngeal strengthening exercises with minimal cues - progressing  2.  Tolerate thermal stimulation to the anterior faucial pillars with 100% effortful swallow responses and delay less than 2 seconds - progressing                       Patient Education:     Patient provided with verbal education regarding SLP POC.  Understanding was verbalized.    Plan:     Will continue to follow and tx as appropriate.    SLP Follow-Up:  Yes   Patient to be seen:  daily   Plan of Care expires:   05/17/23  Plan of Care reviewed with:  patient       Time Tracking:     SLP Treatment Date:   5/11/2023  Speech Start Time:  1300  Speech Stop Time:  1315  Speech Total Time (min):  15    Billable minutes:  Treatment of Swallow Dysfunction, 15 minutes        05/11/2023

## 2023-05-12 PROCEDURE — S4991 NICOTINE PATCH NONLEGEND: HCPCS | Performed by: STUDENT IN AN ORGANIZED HEALTH CARE EDUCATION/TRAINING PROGRAM

## 2023-05-12 PROCEDURE — 21400001 HC TELEMETRY ROOM

## 2023-05-12 PROCEDURE — 25000003 PHARM REV CODE 250: Performed by: INTERNAL MEDICINE

## 2023-05-12 PROCEDURE — 97164 PT RE-EVAL EST PLAN CARE: CPT

## 2023-05-12 PROCEDURE — 97168 OT RE-EVAL EST PLAN CARE: CPT

## 2023-05-12 PROCEDURE — 92526 ORAL FUNCTION THERAPY: CPT

## 2023-05-12 PROCEDURE — 25000003 PHARM REV CODE 250: Performed by: STUDENT IN AN ORGANIZED HEALTH CARE EDUCATION/TRAINING PROGRAM

## 2023-05-12 RX ADMIN — NICOTINE 1 PATCH: 21 PATCH, EXTENDED RELEASE TRANSDERMAL at 09:05

## 2023-05-12 RX ADMIN — POLYETHYLENE GLYCOL 3350 17 G: 17 POWDER, FOR SOLUTION ORAL at 09:05

## 2023-05-12 RX ADMIN — CLOPIDOGREL BISULFATE 75 MG: 75 TABLET ORAL at 09:05

## 2023-05-12 RX ADMIN — ATORVASTATIN CALCIUM 10 MG: 10 TABLET, FILM COATED ORAL at 09:05

## 2023-05-12 RX ADMIN — ASPIRIN 300 MG: 300 SUPPOSITORY RECTAL at 09:05

## 2023-05-12 NOTE — PT/OT/SLP RE-EVAL
Physical Therapy Re-Evaluation    Patient Name:  Marino Mccrary   MRN:  [id number]    Recommendations:     Discharge Recommendations: nursing facility, skilled   Discharge Equipment Recommendations:  (pending progress)   Barriers to discharge: Decreased caregiver support and Impaired mobility    Assessment:     Marino Mccrary is a 63 y.o. male admitted with a medical diagnosis of L AMBER aneurysm s/p coil embolization.  He presents with the following impairments/functional limitations: impaired endurance, impaired functional mobility, gait instability, impaired balance. Patient progressing towards functional PT goals, continues to require CGA/HHA for stability during mobility. Patient with narrow EVAN, increased step length, and slight lateral sway. Patient is appropriate for continued acute PT services.     Rehab Prognosis: Good; patient would benefit from acute skilled PT services to address these deficits and reach maximum level of function.    Recent Surgery: * No procedures listed * 16 Days Post-Op    Plan:     During this hospitalization, patient to be seen 5 x/week to address the identified rehab impairments via gait training, therapeutic activities, therapeutic exercises, neuromuscular re-education and progress toward the following goals:    Plan of Care Expires:  06/09/23    Subjective     Chief Complaint: none stated  Patient/Family Comments/goals: to get stronger  Pain/Comfort:  Pain Rating 1: 0/10    Patients cultural, spiritual, Jain conflicts given the current situation: no    Objective:     Communicated with RN prior to session.  Patient found HOB elevated with chair check, PEG Tube, peripheral IV, telemetry  upon PT entry to room.    General Precautions: Standard, fall, NPO  Orthopedic Precautions:N/A   Braces: N/A  Respiratory Status: Room air      Exams:  RLE ROM: WNL  RLE Strength: 4/5  LLE ROM: WNL  LLE Strength: 4/5  Skin integrity: Visible skin intact      Functional  Mobility:  Transfers:  Sit to Stand:  minimum assistance with hand-held assist  Gait: patient ambulated 200ft with Nicole, narrow EVAN, lateral sway.       AM-PAC 6 CLICK MOBILITY  Total Score:19       Treatment & Education:    Patient provided with verbal education regarding PT POC.  Understanding was verbalized.     Patient left HOB elevated with all lines intact, call button in reach, and RN present.    GOALS:   Multidisciplinary Problems       Physical Therapy Goals          Problem: Physical Therapy    Goal Priority Disciplines Outcome Goal Variances Interventions   Physical Therapy Goal     PT, PT/OT Ongoing, Progressing     Description: Goals to be met by: 23     Patient will increase functional independence with mobility by performin. Supine to sit with Stand-by Assistance  2. Sit to supine with Stand-by Assistance  3. Sit to stand transfer with independence  4. Gait  x 150 feet with independence using No Assistive Device.                          History:     Past Medical History:   Diagnosis Date    Acid reflux     Cerebral aneurysm     Heart disease     Hypertension     Mixed hyperlipidemia        Past Surgical History:   Procedure Laterality Date    BACK SURGERY      CARDIAC SURGERY      ESOPHAGOGASTRODUODENOSCOPY W/ PEG N/A 2023    Procedure: PEG;  Surgeon: Nader Ryan MD;  Location: [facility] END[facility]COPY;  Service: Gastroenterology;  Laterality: N/A;    INSERT / REPLACE / REMOVE PACEMAKER Left     KNEE SURGERY         Time Tracking:     PT Received On: 23  PT Start Time: 1000     PT Stop Time: 1010  PT Total Time (min): 10 min     Billable Minutes: Re-eval 10min      2023

## 2023-05-12 NOTE — PLAN OF CARE
Problem: Physical Therapy  Goal: Physical Therapy Goal  Description: Goals to be met by: 23     Patient will increase functional independence with mobility by performin. Supine to sit with Stand-by Assistance  2. Sit to supine with Stand-by Assistance  3. Sit to stand transfer with independence  4. Gait  x 150 feet with independence using No Assistive Device.     Outcome: Ongoing, Progressing

## 2023-05-12 NOTE — PT/OT/SLP RE-EVAL
Occupational Therapy   Re-evaluation    Name: Marino Mccrary  MRN: [id number]  Admitting Diagnosis:  <principal problem not specified>  Recent Surgery: * No procedures listed * 16 Days Post-Op    Recommendations:     Discharge Recommendations: nursing facility, skilled  Discharge Equipment Recommendations: to be determined by next level of care  Barriers to discharge:       Assessment:     Marino Mccrary is a 63 y.o. male with a medical diagnosis of s/p coil embolization of L AMBER aneurysm, prior R MCA.  He presents with the following performance deficits affecting function are weakness, impaired endurance, impaired self care skills, impaired functional mobility, gait instability, impaired balance, decreased upper extremity function, visual deficits, impaired cognition, decreased coordination.      Rehab Prognosis:  good; patient would benefit from acute skilled OT services to address these deficits and reach maximum level of function.       Plan:     Patient to be seen 5 x/week to address the above listed problems via self-care/home management, therapeutic activities, therapeutic exercises  Plan of Care Expires: 05/10/23  Plan of Care Reviewed with: patient    Subjective       Communicated with: nrsg prior to session.  Pain/Comfort:  Pain Rating 1: 0/10    Objective:     Communicated with: nrsg prior to session.  Patient found HOB elevated with: bed alarm upon OT entry to room.    General Precautions: Standard, fall, peg tube   Orthopedic Precautions: N/A  Braces: N/A  Respiratory Status: Room air  Vital Signs:      Occupational Performance:    Bed Mobility:    Patient completed Supine to Sit with minimum assistance    Functional Mobility/Transfers:  Patient completed Toilet Transfer Step Transfer technique with minimum assistance with  rolling walker  Functional Mobility: no LOB     Activities of Daily Living:  Grooming: contact guard assistance for balance standing at sink ; assist for opening packages   Upper  Body Dressing: moderate assistance    Lower Body Dressing: moderate assistance    Toileting: minimum assistance      Cognitive/Visual Perceptual:  Cognitive/Psychosocial Skills:     -       Oriented to: Person, Place, and Time     Physical Exam:  Sensation:    -       Intact  Upper Extremity Strength:    -       Right Upper Extremity: WFL  -       Left Upper Extremity: -3/5  Fine Motor Coordination:    -       Impaired  Left hand, finger to nose  , Right hand, finger to nose  , Left hand thumb/finger opposition skills  , and Right hand thumb/finger opposition skills      Therapeutic Positioning  Risk for acquired pressure injuries is decreased due to ability to get to BSC/toilet with assist.    OT interventions performed during the course of today's session in an effort to prevent and/or reduce acquired pressure injuries:  Therapeutic positioning completed         OT recommendations for therapeutic positioning throughout hospitalization:   Follow [facility] Pressure Injury Prevention Protocol    [facility] 6 Click:  [facility] Total Score:      Education:  Patient provided with verbal education regarding OT role/goals/POC.  Understanding was verbalized.     A    Patient left up in chair with all lines intact, call button in reach, chair alarm on, and CNA notified    GOALS:   Multidisciplinary Problems       Occupational Therapy Goals          Problem: Occupational Therapy    Goal Priority Disciplines Outcome Interventions   Occupational Therapy Goal     OT, PT/OT Ongoing, Progressing    Description: Goals to be met by: Discharge     Patient will increase functional independence with ADLs by performing:    LE Dressing with Modified [place].  Grooming while standing at sink with Modified [place].  Toileting from toilet with Modified [place] for hygiene and clothing management.   Bathing from  shower chair/bench with Modified [place].  Toilet transfer to toilet with Modified [place].                          History:     Past Medical History:   Diagnosis Date    Acid reflux     Cerebral aneurysm     Heart disease     Hypertension     Mixed hyperlipidemia          Past Surgical History:   Procedure Laterality Date    BACK SURGERY      CARDIAC SURGERY      ESOPHAGOGASTRODUODENOSCOPY W/ PEG N/A 5/9/2023    Procedure: PEG;  Surgeon: Nader Ryan MD;  Location: [facility] ENDOSCOPY;  Service: Gastroenterology;  Laterality: N/A;    INSERT / REPLACE / REMOVE PACEMAKER Left     KNEE SURGERY         Time Tracking:     OT Date of Treatment:    OT Start Time: 0930  OT Stop Time: 0946  OT Total Time (min): 16 min    Billable Minutes:Re-eval 16min     5/12/2023

## 2023-05-12 NOTE — PLAN OF CARE
Problem: Occupational Therapy  Goal: Occupational Therapy Goal  Description: Goals to be met by: Discharge     Patient will increase functional independence with ADLs by performing:    LE Dressing with Modified [place].  Grooming while standing at sink with Modified [place].  Toileting from toilet with Modified [place] for hygiene and clothing management.   Bathing from  shower chair/bench with Modified [place].  Toilet transfer to toilet with Modified [place].    Outcome: Ongoing, Progressing

## 2023-05-12 NOTE — PROGRESS NOTES
[facility] Medicine Progress Note        Chief Complaint: Inpatient Follow-up for  acute stroke     HPI: Mr Mccrary is a 63 y.o. 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 King.  Patient was admitted to ICU postprocedure for monitoring.  He had a delayed recovery from anesthesia.  Patient has a history of right MCA stroke with left-sided residual weakness.  At some point while he was still under the effect of anesthesia, he was unable to move his right lower extremity which has since resolved.  Patient still have weakness in the left upper extremity, which per ICU nurse, his sister confirmed that it is residual from his previous stroke.  Sister reported that patient lives alone but he is pretty functional given he cooks for himself, does his grocery shopping, drives and mows his yard as well.    During ICU stay, overnight, had some confusion, CT head repeated was negative.  Patient did receive IV Ativan.    Patient speech is also dysarthric from his previous stroke.  Sister confirmed that every time patient eat or drink something he coughs. That made as concern for swallow related issues and concern for aspiration as well.  Speech was consulted; following recs. Physical therapy and occupational therapy also consulted; will follow recs for DC planning. Noted to be somewhat confused but able to answer most questions. Nurse stating that this is patient's baseline. Patient is still has NG tube and is receiving TF.   Patient currently being managed for right MCA CVA with left-sided deficits, complicated by oropharyngeal dysphagia on NG tube.  Awaiting PEG tube placement but needs to hold  Plavix for 5 days. Neuro stays it is okay to hold Plavix and continue aspirin rectally or via NG tube and resume Plavix immediately after PEG tube has been placed.  GI placed a PEG tube on 05/09.  No complications .  Plavix has been resumed.  patient currently awaiting placement    Interval Hx:   Sleeping comfortably, easily awoke.  Denies any complaints.  Tube feeds are going through his PEG tube right now.  No family is at bedside  Case discussed with patient nurse and  on the floor    Objective/physical exam:  General: In no acute distress, afebrile  Chest:  Coarse breath sounds anteriorly, on room air  Heart: RRR, +S1, S2, no appreciable murmur  Abdomen: NGT in place; Soft, nontender, BS +  MSK: Warm, no lower extremity edema, no clubbing or cyanosis  Neurologic:  Asleep comfortably     VITAL SIGNS: 24 HRS MIN & MAX LAST   Temp  Min: 97.7 °F (36.5 °C)  Max: 98.8 °F (37.1 °C) 98.6 °F (37 °C)   BP  Min: 102/70  Max: 128/85 128/85   Pulse  Min: 76  Max: 89  76   No data recorded 16   SpO2  Min: 95 %  Max: 99 % 96 %       Recent Labs   Lab 05/10/23  0432   WBC 10.98   RBC 5.28   HGB 15.2   HCT 45.7   MCV 86.6   MCH 28.8   MCHC 33.3   RDW 13.7      MPV 9.8         Recent Labs   Lab 05/10/23  0432   *   K 4.5   CO2 20*   BUN 15.7   CREATININE 0.75   CALCIUM 9.2   MG 2.10            Microbiology Results (last 7 days)       ** No results found for the last 168 hours. **             See below for Radiology    Scheduled Med:   aspirin  300 mg Rectal Daily    atorvastatin  10 mg Per NG tube Daily    clopidogreL  75 mg Per G Tube Daily    nicotine  1 patch Transdermal Daily    polyethylene glycol  17 g Per NG tube Daily    scopolamine  1 patch Transdermal Q3 Days        Continuous Infusions:         PRN Meds:  acetaminophen, ceFAZolin (ANCEF) IVPB, enalaprilat, fentaNYL, glycopyrrolate, hydrALAZINE, HYDROmorphone, labetalol, ondansetron, sodium chloride 0.9%       Assessment/Plan:  Oropharyngeal  dysphagia  s/p peg tube 5/9  s/p Coil Embolization of L AMBER Aneurysm  Prior R MCA CVA with Residual LUE/LLE Weakness, L Facial Droop  HTN, bp is borderline low   Nicotine dependence with current smoking status        S/P PEG tube placement 05/09.  No complications .  Plavix has been resumed.  patient currently awaiting placement  Clinimix discontinued given tube feeds at goal now  Bp meds on hold- bp wnl  PT/OT on board--> recommended snf,  working on it, awaiting acceptance   Patient continued on ASA, plavix, Atorvastatin  Morning CBC, BMP ordered     VTE prophylaxis: SCDs     Patient condition:  Stable     Anticipated discharge and Disposition:   awaits SNF , medically stable to transfer        All diagnosis and differential diagnosis have been reviewed; assessment and plan has been documented; I have personally reviewed the labs and test results that are presently available; I have reviewed the patients medication list; I have reviewed the consulting providers response and recommendations. I have reviewed or attempted to review medical records based upon their availability    All of the patient's questions have been  addressed and answered. Patient's is agreeable to the above stated plan. I will continue to monitor closely and make adjustments to medical management as needed.  _____________________________________________________________________    Nutrition Status:    Radiology:  XR Gastric tube check, non-radiologist performed  EXAMINATION  XR GASTRIC TUBE CHECK, NON-RADIOLOGIST PERFORMED    CLINICAL HISTORY  Replaced NG;    TECHNIQUE  A total of 1 AP image(s) submitted of the partially visualized lower chest and upper abdomen.    COMPARISON  20 April 2023    FINDINGS  Lines/tubes/devices: Esophagogastric tube remains in place, slightly advanced in the interval with the radiolucent side port marker approximately 4 cm distal to the GE junction.  Multiple ECG leads overlie the chest.  ICD lead is  unchanged in the interval.    The visualized cardiopulmonary structures demonstrate no new or worsening focal abnormality.  No enlarging pleural effusion or convincing pneumothorax.  There is no new focal abnormality of the included upper abdomen.  Regional osseous structures are similar.    IMPRESSION  1. Slight interval advancement of NG tube, as above.  2. No evidence of new or worsening abnormality.    Electronically signed by: Brandon Constantino  Date:    05/03/2023  Time:    13:29  Fl Modified Barium Swallow Speech  See procedure notes from Speech Pathologist.    This procedure was auto-finalized.      Kobe Antoine MD  Department of Hospital Medicine   [facility]   05/12/2023

## 2023-05-12 NOTE — PT/OT/SLP PROGRESS
Speech Language Pathology Department  Dysphagia Therapy Progress Note    Patient Name:  Marino Mccrary   MRN:  [id number]  Admitting Diagnosis: unruptured cerebral aneurysm s/p coiling    Recommendations:     General recommendations:  dysphagia therapy  Diet recommendations:  NPO, Liquid Diet Level: NPO     Discharge recommendations:  nursing facility, skilled   Barriers to safe discharge:  level of skilled assistance needed    Subjective     Patient awake, alert, and cooperative.    Pain/Comfort: none    Spiritual/Cultural/[place] Beliefs/Practices that affect care: no    Respiratory Status: room air    Objective:     Oral Musculature  Dentition: edentulous  Secretion Management: problems swallowing saliva and coughing on secretions  Mucosal Quality: good  Facial Movement: WFL  Vocal Quality: strained/strangled  Volitional Cough: Weak    Therapeutic Activities:  Pt completed base of tongue and laryngeal x15 with minimal-moderate cues.  Pt tolerated thermal stimulation to the anterior faucial pillars x10 with x1 swallow responses.     Assessment:     Pt continues to present with oropharyngeal dysphagia and remains unsafe for PO intake.    Goals:     Multidisciplinary Problems       SLP Goals          Problem: SLP    Goal Priority Disciplines Outcome   SLP Goal     SLP Ongoing, Progressing   Description:   LTG: Tolerate least restrictive PO diet with no clinical signs/sx aspiration - progressing  STGs:  1.  Complete base of tongue and laryngeal strengthening exercises with minimal cues - progressing  2.  Tolerate thermal stimulation to the anterior faucial pillars with 100% effortful swallow responses and delay less than 2 seconds - progressing                       Patient Education:     Patient provided with verbal education regarding SLP POC.  Understanding was verbalized.    Plan:     Will continue to follow and tx as appropriate.    SLP Follow-Up:  Yes   Patient to be seen:  daily   Plan of Care expires:   05/17/23  Plan of Care reviewed with:  patient       Time Tracking:     SLP Treatment Date:   5/12/2023  Speech Start Time:  1300  Speech Stop Time:  1315  Speech Total Time (min):  15    Billable minutes:  Treatment of Swallow Dysfunction, 15 minutes        05/12/2023

## 2023-05-13 LAB
ANION GAP SERPL CALC-SCNC: 9 MEQ/L
BUN SERPL-MCNC: 17.6 MG/DL (ref 8.4–25.7)
CALCIUM SERPL-MCNC: 9.3 MG/DL (ref 8.8–10)
CHLORIDE SERPL-SCNC: 105 MMOL/L (ref 98–107)
CO2 SERPL-SCNC: 23 MMOL/L (ref 23–31)
CREAT SERPL-MCNC: 0.68 MG/DL (ref 0.73–1.18)
CREAT/UREA NIT SERPL: 26
ERYTHROCYTE [DISTWIDTH] IN BLOOD BY AUTOMATED COUNT: 14.2 % (ref 11.5–17)
GFR SERPLBLD CREATININE-BSD FMLA CKD-EPI: >60 MLS/MIN/1.73/M2
GLUCOSE SERPL-MCNC: 98 MG/DL (ref 82–115)
HCT VFR BLD AUTO: 43.4 % (ref 42–52)
HGB BLD-MCNC: 14.4 G/DL (ref 14–18)
MAGNESIUM SERPL-MCNC: 1.9 MG/DL (ref 1.6–2.6)
MCH RBC QN AUTO: 29.1 PG (ref 27–31)
MCHC RBC AUTO-ENTMCNC: 33.2 G/DL (ref 33–36)
MCV RBC AUTO: 87.9 FL (ref 80–94)
NRBC BLD AUTO-RTO: 0 %
PLATELET # BLD AUTO: 327 X10(3)/MCL (ref 130–400)
PMV BLD AUTO: 10 FL (ref 7.4–10.4)
POTASSIUM SERPL-SCNC: 4.4 MMOL/L (ref 3.5–5.1)
RBC # BLD AUTO: 4.94 X10(6)/MCL (ref 4.7–6.1)
SODIUM SERPL-SCNC: 137 MMOL/L (ref 136–145)
WBC # SPEC AUTO: 7.07 X10(3)/MCL (ref 4.5–11.5)

## 2023-05-13 PROCEDURE — 25000003 PHARM REV CODE 250: Performed by: INTERNAL MEDICINE

## 2023-05-13 PROCEDURE — 80048 BASIC METABOLIC PNL TOTAL CA: CPT | Performed by: INTERNAL MEDICINE

## 2023-05-13 PROCEDURE — 85027 COMPLETE CBC AUTOMATED: CPT | Performed by: INTERNAL MEDICINE

## 2023-05-13 PROCEDURE — 25000003 PHARM REV CODE 250: Performed by: STUDENT IN AN ORGANIZED HEALTH CARE EDUCATION/TRAINING PROGRAM

## 2023-05-13 PROCEDURE — 83735 ASSAY OF MAGNESIUM: CPT | Performed by: INTERNAL MEDICINE

## 2023-05-13 PROCEDURE — 21400001 HC TELEMETRY ROOM

## 2023-05-13 PROCEDURE — S4991 NICOTINE PATCH NONLEGEND: HCPCS | Performed by: STUDENT IN AN ORGANIZED HEALTH CARE EDUCATION/TRAINING PROGRAM

## 2023-05-13 RX ADMIN — NICOTINE 1 PATCH: 21 PATCH, EXTENDED RELEASE TRANSDERMAL at 09:05

## 2023-05-13 RX ADMIN — CLOPIDOGREL BISULFATE 75 MG: 75 TABLET ORAL at 09:05

## 2023-05-13 RX ADMIN — POLYETHYLENE GLYCOL 3350 17 G: 17 POWDER, FOR SOLUTION ORAL at 09:05

## 2023-05-13 RX ADMIN — ASPIRIN 300 MG: 300 SUPPOSITORY RECTAL at 09:05

## 2023-05-13 RX ADMIN — ATORVASTATIN CALCIUM 10 MG: 10 TABLET, FILM COATED ORAL at 09:05

## 2023-05-13 NOTE — PROGRESS NOTES
[facility] Medicine Progress Note        Chief Complaint: Inpatient Follow-up for  acute stroke     HPI: Mr Mccrary is a 63 y.o. 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 King.  Patient was admitted to ICU postprocedure for monitoring.  He had a delayed recovery from anesthesia.  Patient has a history of right MCA stroke with left-sided residual weakness.  At some point while he was still under the effect of anesthesia, he was unable to move his right lower extremity which has since resolved.  Patient still have weakness in the left upper extremity, which per ICU nurse, his sister confirmed that it is residual from his previous stroke.  Sister reported that patient lives alone but he is pretty functional given he cooks for himself, does his grocery shopping, drives and mows his yard as well.    During ICU stay, overnight, had some confusion, CT head repeated was negative.  Patient did receive IV Ativan.    Patient speech is also dysarthric from his previous stroke.  Sister confirmed that every time patient eat or drink something he coughs. That made as concern for swallow related issues and concern for aspiration as well.  Speech was consulted; following recs. Physical therapy and occupational therapy also consulted; will follow recs for DC planning. Noted to be somewhat confused but able to answer most questions. Nurse stating that this is patient's baseline. Patient is still has NG tube and is receiving TF.   Patient currently being managed for right MCA CVA with left-sided deficits, complicated by oropharyngeal dysphagia on NG tube.  Awaiting PEG tube placement but needs to hold  Plavix for 5 days. Neuro stays it is okay to hold Plavix and continue aspirin rectally or via NG tube and resume Plavix immediately after PEG tube has been placed.  GI placed a PEG tube on 05/09.  No complications .  Plavix has been resumed.  patient currently awaiting placement    Interval Hx:   Laying in bed, awake.  No complaints.  Tube feeds at goal through his PEG tube  No family is at bedside  Case discussed with patient nurse on the floor    Objective/physical exam:  General: In no acute distress, afebrile  Chest:  Coarse breath sounds anteriorly, on room air  Heart: RRR, +S1, S2, no appreciable murmur  Abdomen: NGT in place; Soft, nontender, BS +  MSK: Warm, no lower extremity edema, no clubbing or cyanosis  Neurologic:  Awake and alert, speech is slightly garbled but comprehending and responding.  Able to move all 4 extremities.  left side is still weaker     VITAL SIGNS: 24 HRS MIN & MAX LAST   Temp  Min: 97.5 °F (36.4 °C)  Max: 98.6 °F (37 °C) 98.2 °F (36.8 °C)   BP  Min: 115/76  Max: 142/85 135/85   Pulse  Min: 69  Max: 77  77   Resp  Min: 17  Max: 18 17   SpO2  Min: 96 %  Max: 100 % 96 %       Recent Labs   Lab 05/10/23  0432 05/13/23 0427   WBC 10.98 7.07   RBC 5.28 4.94   HGB 15.2 14.4   HCT 45.7 43.4   MCV 86.6 87.9   MCH 28.8 29.1   MCHC 33.3 33.2   RDW 13.7 14.2    327   MPV 9.8 10.0         Recent Labs   Lab 05/10/23  0432 05/13/23 0427   * 137   K 4.5 4.4   CO2 20* 23   BUN 15.7 17.6   CREATININE 0.75 0.68*   CALCIUM 9.2 9.3   MG 2.10  --             Microbiology Results (last 7 days)       ** No results found for the last 168 hours. **             See below for Radiology    Scheduled Med:   aspirin  300 mg Rectal Daily    atorvastatin  10 mg Per NG tube Daily    clopidogreL  75 mg Per G Tube Daily    nicotine  1 patch Transdermal Daily    polyethylene glycol  17 g Per NG tube Daily    scopolamine  1 patch Transdermal Q3 Days        Continuous Infusions:         PRN  Meds:  acetaminophen, enalaprilat, fentaNYL, glycopyrrolate, hydrALAZINE, HYDROmorphone, labetalol, ondansetron, sodium chloride 0.9%       Assessment/Plan:  Oropharyngeal dysphagia  s/p peg tube 5/9  s/p Coil Embolization of L AMBER Aneurysm  Prior R MCA CVA with Residual LUE/LLE Weakness, L Facial Droop  HTN, bp is borderline low   Nicotine dependence with current smoking status        S/P PEG tube placement 05/09.  No complications .  Plavix has been resumed.  patient currently awaiting placement  Clinimix discontinued given tube feeds at goal now  Bp meds on hold- bp wnl  PT/OT on board--> recommended snf,  working on it, awaiting acceptance   Patient continued on ASA, plavix, Atorvastatin  Morning CBC, BMP stable     VTE prophylaxis: SCDs     Patient condition:  Stable     Anticipated discharge and Disposition:   awaits SNF , medically stable to transfer        All diagnosis and differential diagnosis have been reviewed; assessment and plan has been documented; I have personally reviewed the labs and test results that are presently available; I have reviewed the patients medication list; I have reviewed the consulting providers response and recommendations. I have reviewed or attempted to review medical records based upon their availability    All of the patient's questions have been  addressed and answered. Patient's is agreeable to the above stated plan. I will continue to monitor closely and make adjustments to medical management as needed.  _____________________________________________________________________    Nutrition Status:    Radiology:  XR Gastric tube check, non-radiologist performed  EXAMINATION  XR GASTRIC TUBE CHECK, NON-RADIOLOGIST PERFORMED    CLINICAL HISTORY  Replaced NG;    TECHNIQUE  A total of 1 AP image(s) submitted of the partially visualized lower chest and upper abdomen.    COMPARISON  20 April 2023    FINDINGS  Lines/tubes/devices: Esophagogastric tube remains in place,  slightly advanced in the interval with the radiolucent side port marker approximately 4 cm distal to the GE junction.  Multiple ECG leads overlie the chest.  ICD lead is unchanged in the interval.    The visualized cardiopulmonary structures demonstrate no new or worsening focal abnormality.  No enlarging pleural effusion or convincing pneumothorax.  There is no new focal abnormality of the included upper abdomen.  Regional osseous structures are similar.    IMPRESSION  1. Slight interval advancement of NG tube, as above.  2. No evidence of new or worsening abnormality.    Electronically signed by: Brandon Constantino  Date:    05/03/2023  Time:    13:29  Fl Modified Barium Swallow Speech  See procedure notes from Speech Pathologist.    This procedure was auto-finalized.      Kobe Antoine MD  Department of Hospital Medicine   [facility]   05/13/2023

## 2023-05-14 PROCEDURE — 25000003 PHARM REV CODE 250: Performed by: INTERNAL MEDICINE

## 2023-05-14 PROCEDURE — S4991 NICOTINE PATCH NONLEGEND: HCPCS | Performed by: STUDENT IN AN ORGANIZED HEALTH CARE EDUCATION/TRAINING PROGRAM

## 2023-05-14 PROCEDURE — 21400001 HC TELEMETRY ROOM

## 2023-05-14 PROCEDURE — 25000003 PHARM REV CODE 250: Performed by: STUDENT IN AN ORGANIZED HEALTH CARE EDUCATION/TRAINING PROGRAM

## 2023-05-14 RX ADMIN — NICOTINE 1 PATCH: 21 PATCH, EXTENDED RELEASE TRANSDERMAL at 08:05

## 2023-05-14 RX ADMIN — CLOPIDOGREL BISULFATE 75 MG: 75 TABLET ORAL at 08:05

## 2023-05-14 RX ADMIN — SCOPOLAMINE 1 PATCH: 1 PATCH TRANSDERMAL at 04:05

## 2023-05-14 RX ADMIN — ATORVASTATIN CALCIUM 10 MG: 10 TABLET, FILM COATED ORAL at 08:05

## 2023-05-14 RX ADMIN — ASPIRIN 300 MG: 300 SUPPOSITORY RECTAL at 08:05

## 2023-05-14 RX ADMIN — POLYETHYLENE GLYCOL 3350 17 G: 17 POWDER, FOR SOLUTION ORAL at 08:05

## 2023-05-14 NOTE — PROGRESS NOTES
[facility] Medicine Progress Note        Chief Complaint: Inpatient Follow-up for  acute stroke     HPI: Mr Mccrary is a 63 y.o. 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 King.  Patient was admitted to ICU postprocedure for monitoring.  He had a delayed recovery from anesthesia.  Patient has a history of right MCA stroke with left-sided residual weakness.  At some point while he was still under the effect of anesthesia, he was unable to move his right lower extremity which has since resolved.  Patient still have weakness in the left upper extremity, which per ICU nurse, his sister confirmed that it is residual from his previous stroke.  Sister reported that patient lives alone but he is pretty functional given he cooks for himself, does his grocery shopping, drives and mows his yard as well.    During ICU stay, overnight, had some confusion, CT head repeated was negative.  Patient did receive IV Ativan.    Patient speech is also dysarthric from his previous stroke.  Sister confirmed that every time patient eat or drink something he coughs. That made as concern for swallow related issues and concern for aspiration as well.  Speech was consulted; following recs. Physical therapy and occupational therapy also consulted; will follow recs for DC planning. Noted to be somewhat confused but able to answer most questions. Nurse stating that this is patient's baseline. Patient is still has NG tube and is receiving TF.   Patient currently being managed for right MCA CVA with left-sided deficits, complicated by oropharyngeal dysphagia on NG tube.  Awaiting PEG tube placement but needs to hold  Plavix for 5 days. Neuro stays it is okay to hold Plavix and continue aspirin rectally or via NG tube and resume Plavix immediately after PEG tube has been placed.  GI placed a PEG tube on 05/09.  No complications .  Plavix has been resumed.  patient currently awaiting placement    Interval Hx:   Laying in bed, awake.  No complaints.  Tube feeds at goal through his PEG tube  No family is at bedside  Case discussed with patient nurse on the floor    Objective/physical exam:  General: In no acute distress, afebrile  Chest:  Clear anteriorly, on room air  Heart: RRR, +S1, S2, no appreciable murmur  Abdomen: PEG in place; Soft, nontender, BS +  MSK: Warm, no lower extremity edema, no clubbing or cyanosis  Neurologic:  Awake and alert, speech is clear.  Able to move all 4 extremities.  left side is still weaker     VITAL SIGNS: 24 HRS MIN & MAX LAST   Temp  Min: 97.7 °F (36.5 °C)  Max: 99.9 °F (37.7 °C) 99.9 °F (37.7 °C)   BP  Min: 117/72  Max: 134/89 117/72   Pulse  Min: 65  Max: 80  80   Resp  Min: 18  Max: 18 18   SpO2  Min: 96 %  Max: 100 % 96 %       Recent Labs   Lab 05/10/23  0432 05/13/23 0427   WBC 10.98 7.07   RBC 5.28 4.94   HGB 15.2 14.4   HCT 45.7 43.4   MCV 86.6 87.9   MCH 28.8 29.1   MCHC 33.3 33.2   RDW 13.7 14.2    327   MPV 9.8 10.0         Recent Labs   Lab 05/10/23  0432 05/13/23  0427   * 137   K 4.5 4.4   CO2 20* 23   BUN 15.7 17.6   CREATININE 0.75 0.68*   CALCIUM 9.2 9.3   MG 2.10 1.90            Microbiology Results (last 7 days)       ** No results found for the last 168 hours. **             See below for Radiology    Scheduled Med:   aspirin  300 mg Rectal Daily    atorvastatin  10 mg Per NG tube Daily    clopidogreL  75 mg Per G Tube Daily    nicotine  1 patch Transdermal Daily    polyethylene glycol  17 g Per NG tube Daily    scopolamine  1 patch Transdermal Q3 Days        Continuous Infusions:         PRN Meds:  acetaminophen, enalaprilat, fentaNYL, glycopyrrolate,  hydrALAZINE, HYDROmorphone, labetalol, ondansetron, sodium chloride 0.9%       Assessment/Plan:  Oropharyngeal dysphagia  s/p peg tube 5/9  s/p Coil Embolization of L AMBER Aneurysm  Prior R MCA CVA with Residual LUE/LLE Weakness, L Facial Droop  HTN, bp is borderline low   Nicotine dependence with current smoking status        S/P PEG tube placement 05/09.  No complications .  Tube feeds at goal.  Plavix has been resumed.  patient currently awaiting placement  Clinimix discontinued given tube feeds at goal now  Bp meds on hold- bp wnl  PT/OT on board--> recommended snf,  working on it, awaiting acceptance   Patient continued on ASA, plavix, Atorvastatin  Morning CBC, BMP stable     VTE prophylaxis: SCDs     Patient condition:  Stable     Anticipated discharge and Disposition:   awaits SNF, medically cleared for transfer        All diagnosis and differential diagnosis have been reviewed; assessment and plan has been documented; I have personally reviewed the labs and test results that are presently available; I have reviewed the patients medication list; I have reviewed the consulting providers response and recommendations. I have reviewed or attempted to review medical records based upon their availability    All of the patient's questions have been  addressed and answered. Patient's is agreeable to the above stated plan. I will continue to monitor closely and make adjustments to medical management as needed.  _____________________________________________________________________    Nutrition Status:    Radiology:  XR Gastric tube check, non-radiologist performed  EXAMINATION  XR GASTRIC TUBE CHECK, NON-RADIOLOGIST PERFORMED    CLINICAL HISTORY  Replaced NG;    TECHNIQUE  A total of 1 AP image(s) submitted of the partially visualized lower chest and upper abdomen.    COMPARISON  20 April 2023    FINDINGS  Lines/tubes/devices: Esophagogastric tube remains in place, slightly advanced in the interval with the  radiolucent side port marker approximately 4 cm distal to the GE junction.  Multiple ECG leads overlie the chest.  ICD lead is unchanged in the interval.    The visualized cardiopulmonary structures demonstrate no new or worsening focal abnormality.  No enlarging pleural effusion or convincing pneumothorax.  There is no new focal abnormality of the included upper abdomen.  Regional osseous structures are similar.    IMPRESSION  1. Slight interval advancement of NG tube, as above.  2. No evidence of new or worsening abnormality.    Electronically signed by: Brandon Constantino  Date:    05/03/2023  Time:    13:29  Fl Modified Barium Swallow Speech  See procedure notes from Speech Pathologist.    This procedure was auto-finalized.      Kobe Antoine MD  Department of Hospital Medicine   [facility]   05/14/2023

## 2023-05-15 VITALS
WEIGHT: 164 LBS | HEART RATE: 79 BPM | OXYGEN SATURATION: 94 % | TEMPERATURE: 98 F | SYSTOLIC BLOOD PRESSURE: 130 MMHG | DIASTOLIC BLOOD PRESSURE: 77 MMHG | HEIGHT: 68 IN | BODY MASS INDEX: 24.86 KG/M2 | RESPIRATION RATE: 18 BRPM

## 2023-05-15 PROCEDURE — 97535 SELF CARE MNGMENT TRAINING: CPT | Mod: CO

## 2023-05-15 PROCEDURE — 97110 THERAPEUTIC EXERCISES: CPT | Mod: CQ

## 2023-05-15 PROCEDURE — 25000003 PHARM REV CODE 250: Performed by: INTERNAL MEDICINE

## 2023-05-15 PROCEDURE — 25000003 PHARM REV CODE 250: Performed by: NURSE PRACTITIONER

## 2023-05-15 PROCEDURE — 94761 N-INVAS EAR/PLS OXIMETRY MLT: CPT

## 2023-05-15 PROCEDURE — 97116 GAIT TRAINING THERAPY: CPT | Mod: CQ

## 2023-05-15 PROCEDURE — 25000003 PHARM REV CODE 250: Performed by: STUDENT IN AN ORGANIZED HEALTH CARE EDUCATION/TRAINING PROGRAM

## 2023-05-15 PROCEDURE — S4991 NICOTINE PATCH NONLEGEND: HCPCS | Performed by: STUDENT IN AN ORGANIZED HEALTH CARE EDUCATION/TRAINING PROGRAM

## 2023-05-15 RX ORDER — IBUPROFEN 200 MG
1 TABLET ORAL DAILY
Qty: 30 PATCH
Start: 2023-05-15

## 2023-05-15 RX ORDER — MAG HYDROX/ALUMINUM HYD/SIMETH 200-200-20
30 SUSPENSION, ORAL (FINAL DOSE FORM) ORAL ONCE
Status: COMPLETED | OUTPATIENT
Start: 2023-05-15 | End: 2023-05-15

## 2023-05-15 RX ADMIN — ALUMINUM HYDROXIDE, MAGNESIUM HYDROXIDE, AND SIMETHICONE 30 ML: 200; 200; 20 SUSPENSION ORAL at 07:05

## 2023-05-15 RX ADMIN — POLYETHYLENE GLYCOL 3350 17 G: 17 POWDER, FOR SOLUTION ORAL at 08:05

## 2023-05-15 RX ADMIN — CLOPIDOGREL BISULFATE 75 MG: 75 TABLET ORAL at 08:05

## 2023-05-15 RX ADMIN — NICOTINE 1 PATCH: 21 PATCH, EXTENDED RELEASE TRANSDERMAL at 08:05

## 2023-05-15 RX ADMIN — ASPIRIN 300 MG: 300 SUPPOSITORY RECTAL at 08:05

## 2023-05-15 RX ADMIN — ATORVASTATIN CALCIUM 10 MG: 10 TABLET, FILM COATED ORAL at 08:05

## 2023-05-15 NOTE — PT/OT/SLP PROGRESS
Physical Therapy Treatment    Patient Name:  Marino Mccrary   MRN:  [id number]    Recommendations:     Discharge Recommendations: nursing facility, skilled  Discharge Equipment Recommendations: to be determined by next level of care  Barriers to discharge: Impaired mobility    Assessment:     Marino Mccrary is a 63 y.o. male admitted with a medical diagnosis of Unruptured cerebral aneurysm.  He presents with the following impairments/functional limitations: weakness, gait instability, impaired endurance, impaired balance, impaired self care skills, impaired functional mobility, decreased safety awareness, decreased upper extremity function .    Possible d/c today. Pt sitting up in bed, pleasant and agreeable to tx session. Pt requires min assist with ambulation 2/2 unsteadiness.     Rehab Prognosis: Good; patient would benefit from acute skilled PT services to address these deficits and reach maximum level of function.    Recent Surgery: * No procedures listed * 19 Days Post-Op    Plan:     During this hospitalization, patient to be seen 5 x/week to address the identified rehab impairments via gait training, therapeutic activities, therapeutic exercises and progress toward the following goals:    Plan of Care Expires:  06/09/23    Subjective     Chief Complaint: none  Patient/Family Comments/goals: n/a  Pain/Comfort:  Pain Rating 1: 0/10      Objective:     Communicated with nurse prior to session.  Patient found HOB elevated with PEG Tube, pulse ox (continuous), telemetry upon PTA entry to room. HOB locked at 30 degrees and bed alarm activated.     General Precautions: Standard, fall, NPO, SBP <140  Orthopedic Precautions: N/A  Braces: N/A  Respiratory Status: Room air; SPO2 94%  Blood Pressure: 130/77  Skin Integrity: Visible skin intact      Functional Mobility:  Bed Mobility:     Supine<->sit; SBA  Transfers:     Sit to Stand:  min assist  Gait: ~ 250' with HHA x 1 initially then progressed to no AD. NBOS  and mild lateral sway with 1 episode of increased unsteadiness with head turns, no major LOB.     Therapeutic Activities/Exercises:  BALDOMERO SLR and hip abd x 10 reps    Education:  Patient provided with verbal education regarding safety awareness, POC and HEP.  Understanding was verbalized, however additional teaching warranted.     Patient left HOB elevated with all lines intact and call button in reach.    GOALS:   Multidisciplinary Problems       Physical Therapy Goals          Problem: Physical Therapy    Goal Priority Disciplines Outcome Goal Variances Interventions   Physical Therapy Goal     PT, PT/OT Ongoing, Progressing     Description: Goals to be met by: 23     Patient will increase functional independence with mobility by performin. Supine to sit with Stand-by Assistance  2. Sit to supine with Stand-by Assistance  3. Sit to stand transfer with independence  4. Gait  x 150 feet with independence using No Assistive Device.                          Time Tracking:     PT Received On: 05/15/23  PT Start Time: 1100     PT Stop Time: 1123  PT Total Time (min): 23 min     Billable Minutes: Gait Training 1 unit and Therapeutic Exercise 1 unit    Treatment Type: Treatment  PT/PTA: PTA     Number of PTA visits since last PT visit: 1     05/15/2023

## 2023-05-15 NOTE — PT/OT/SLP PROGRESS
Occupational Therapy   Treatment    Name: Marnio Mccrary  MRN: [id number]  Admitting Diagnosis:  Unruptured cerebral aneurysm  19 Days Post-Op    Recommendations:     Discharge Recommendations: nursing facility, skilled  Discharge Equipment Recommendations:  to be determined by next level of care  Barriers to discharge:       Assessment:     Marino Mccrary is a 63 y.o. male with a medical diagnosis of Unruptured cerebral aneurysm. Performance deficits affecting function are weakness, impaired endurance, impaired self care skills, impaired functional mobility, gait instability, impaired balance, decreased safety awareness, decreased upper extremity function.     Rehab Prognosis:  Good; patient would benefit from acute skilled OT services to address these deficits and reach maximum level of function.       Plan:     Patient to be seen 5 x/week to address the above listed problems via self-care/home management, therapeutic activities, therapeutic exercises  Plan of Care Expires: 05/10/23  Plan of Care Reviewed with: patient    Subjective     Pain/Comfort:   No c/o pain.     Objective:     Communicated with: RN prior to session.  Patient found supine with PEG Tube, telemetry, pulse ox (continuous) upon OT entry to room.    General Precautions: Standard, fall, NPO    Orthopedic Precautions:N/A  Braces: N/A  Respiratory Status: Room air  Vital Signs: Blood Pressure: 128/79     Occupational Performance:     Bed Mobility:    Patient completed Supine to Sit with contact guard assistance  Patient completed Sit to Supine with contact guard assistance     Functional Mobility/Transfers:  Patient completed Sit <> Stand Transfer with minimum assistance  with  no assistive device   Functional Mobility: Min A with RW to and from bathroom. Slightly unsteady but no major LOB.    Activities of Daily Living:  Toileting: performed toilet t/f with CGA for descent to low surface. Able to perform posterior pericare following BM with  SBA.   Grooming: completed oral hygiene and washed face standing at sink with CGA for standing balance.    UE dressing: donned gown with SBA and cues for marisol technique. Demo'd good carryover.     Therapeutic Positioning    OT interventions performed during the course of today's session in an effort to prevent and/or reduce acquired pressure injuries:   Education on Pressure Ulcer Prevention provided    Skin assessment: all visible skin assessed   Findings: no redness or breakdown noted    [facility] 6 Click ADL: 16    Patient Education:  Patient provided with verbal education regarding OT role/goals/POC, safety awareness, and Discharge/DME recommendations.  Understanding was verbalized.      Patient left supine with all lines intact and call button in reach    GOALS:   Multidisciplinary Problems       Occupational Therapy Goals          Problem: Occupational Therapy    Goal Priority Disciplines Outcome Interventions   Occupational Therapy Goal     OT, PT/OT Ongoing, Progressing    Description: Goals to be met by: Discharge     Patient will increase functional independence with ADLs by performing:    LE Dressing with Modified [place].  Grooming while standing at sink with Modified [place].  Toileting from toilet with Modified [place] for hygiene and clothing management.   Bathing from  shower chair/bench with Modified [place].  Toilet transfer to toilet with Modified [place].                         Time Tracking:     OT Date of Treatment: 05/15/23  OT Start Time: 0929  OT Stop Time: 0952  OT Total Time (min): 23 min    Billable Minutes:Self Care/Home Management 23    OT/EDVIN: EDVIN     Number of EDVIN visits since last OT visit: 1    5/15/2023

## 2023-05-15 NOTE — DISCHARGE SUMMARY
[facility] Hospital Medicine Discharge Summary    Admit Date: 4/26/2023  Discharge Date and Time: 5/15/24495:40 AM  Admitting Physician: ELDER Team  Discharging Physician: Kobe Antoine MD.  Primary Care Physician: TERESSA Andrews MD  Consults: Gastroenterology, General Surgery, and Nutrition    Discharge Diagnoses:  Oropharyngeal dysphagia  s/p peg tube 5/9  s/p Coil Embolization of L AMBER Aneurysm  Prior R MCA CVA with Residual LUE/LLE Weakness, L Facial Droop  HTN, bp is borderline low   Nicotine dependence with current smoking status    Hospital Course:   Mr Mccrary is a 63 y.o. 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 King.  Patient was admitted to ICU postprocedure for monitoring.  He had a delayed recovery from anesthesia.  Patient has a history of right MCA stroke with left-sided residual weakness.  At some point while he was still under the effect of anesthesia, he was unable to move his right lower extremity which has since resolved.  Patient still have weakness in the left upper extremity, which per ICU nurse, his sister confirmed that it is residual from his previous stroke.  Sister reported that patient lives alone but he is pretty functional given he cooks for himself, does his grocery shopping, drives and mows his yard as well.    During ICU stay, overnight, had some confusion, CT head repeated was negative.  Patient did receive IV Ativan.    Patient speech is also dysarthric from his previous stroke.  Sister confirmed that every time patient eat or drink something he coughs. That made as concern for swallow related issues and concern for aspiration as well.  Speech was  consulted; following recs. Physical therapy and occupational therapy also consulted; will follow recs for DC planning. Noted to be somewhat confused but able to answer most questions. Nurse stating that this is patient's baseline. Patient is still has NG tube and is receiving TF.   Patient currently being managed for right MCA CVA with left-sided deficits, complicated by oropharyngeal dysphagia on NG tube.  Awaiting PEG tube placement but needs to hold Plavix for 5 days. Neuro stays it is okay to hold Plavix and continue aspirin rectally or via NG tube and resume Plavix immediately after PEG tube has been placed.  GI placed a PEG tube on 05/09.  No complications .  Plavix has been resumed.  patient currently awaiting placement  S/P PEG tube placement 05/09.  No complications .  Tube feeds at goal.  Plavix has been resumed.  patient currently awaiting placement  Clinimix discontinued given tube feeds at goal now  Bp meds on hold- bp wnl  PT/OT on board--> recommended snf,  working on it, awaiting acceptance   Patient continued on ASA, plavix, Atorvastatin  Pt has been accepted to [facility], He has been medically ready for discharge     Pt was seen and examined on the day of discharge  Vitals:  VITAL SIGNS: 24 HRS MIN & MAX LAST   Temp  Min: 97.7 °F (36.5 °C)  Max: 98.8 °F (37.1 °C) 97.7 °F (36.5 °C)   BP  Min: 121/77  Max: 153/90 126/81   Pulse  Min: 70  Max: 78  76   Resp  Min: 18  Max: 18 18   SpO2  Min: 96 %  Max: 99 % 98 %       Physical Exam:  General: In no acute distress, afebrile  Chest:  Clear anteriorly, on room air  Heart: RRR, +S1, S2, no appreciable murmur  Abdomen: PEG in place; Soft, nontender, BS +  MSK: Warm, no lower extremity edema, no clubbing or cyanosis  Neurologic:  Awake and alert, speech is clear.  Able to move all 4 extremities.  left side is still weaker    Procedures Performed: No admission procedures for hospital encounter.     Significant Diagnostic Studies: See  Full reports for all details    Recent Labs   Lab 05/10/23  0432 05/13/23 0427   WBC 10.98 7.07   RBC 5.28 4.94   HGB 15.2 14.4   HCT 45.7 43.4   MCV 86.6 87.9   MCH 28.8 29.1   MCHC 33.3 33.2   RDW 13.7 14.2    327   MPV 9.8 10.0       Recent Labs   Lab 05/10/23  0432 05/13/23 0427   * 137   K 4.5 4.4   CO2 20* 23   BUN 15.7 17.6   CREATININE 0.75 0.68*   CALCIUM 9.2 9.3   MG 2.10 1.90        Microbiology Results (last 7 days)       ** No results found for the last 168 hours. **             XR Gastric tube check, non-radiologist performed  EXAMINATION  XR GASTRIC TUBE CHECK, NON-RADIOLOGIST PERFORMED    CLINICAL HISTORY  Replaced NG;    TECHNIQUE  A total of 1 AP image(s) submitted of the partially visualized lower chest and upper abdomen.    COMPARISON  20 April 2023    FINDINGS  Lines/tubes/devices: Esophagogastric tube remains in place, slightly advanced in the interval with the radiolucent side port marker approximately 4 cm distal to the GE junction.  Multiple ECG leads overlie the chest.  ICD lead is unchanged in the interval.    The visualized cardiopulmonary structures demonstrate no new or worsening focal abnormality.  No enlarging pleural effusion or convincing pneumothorax.  There is no new focal abnormality of the included upper abdomen.  Regional osseous structures are similar.    IMPRESSION  1. Slight interval advancement of NG tube, as above.  2. No evidence of new or worsening abnormality.    Electronically signed by: Brandon Constantino  Date:    05/03/2023  Time:    13:29  Fl Modified Barium Swallow Speech  See procedure notes from Speech Pathologist.    This procedure was auto-finalized.         Medication List        START taking these medications      nicotine 14 mg/24 hr  Commonly known as: NICODERM CQ  Place 1 patch onto the skin once daily.            CONTINUE taking these medications      aspirin 81 MG EC tablet  Commonly known as: ECOTRIN     atorvastatin 10 MG tablet  Commonly  known as: LIPITOR     clopidogreL 75 mg tablet  Commonly known as: PLAVIX     pantoprazole 40 MG tablet  Commonly known as: PROTONIX  Take 1 tablet (40 mg total) by mouth once daily.     sacubitriL-valsartan 24-26 mg per tablet  Commonly known as: ENTRESTO     scopolamine 1.3-1.5 mg (1 mg over 3 days)  Commonly known as: TRANSDERM-SCOP            STOP taking these medications      metoprolol succinate 100 MG 24 hr tablet  Commonly known as: TOPROL-XL     QUEtiapine 100 MG Tab  Commonly known as: SEROQUEL     spironolactone 25 MG tablet  Commonly known as: ALDACTONE     zolpidem 5 MG Tab  Commonly known as: AMBIEN               Where to Get Your Medications        Information about where to get these medications is not yet available    Ask your nurse or doctor about these medications  nicotine 14 mg/24 hr          Explained in detail to the patient about the discharge plan, medications, and follow-up visits. Pt understands and agrees with the treatment plan  Discharge Disposition: [facility]   Discharged Condition: stable  Diet-   Dietary Orders (From admission, onward)       Start     Ordered    05/10/23 1332  Tube Feedings/Formulas 75; 1,500; Fibersource HN; Peg; 100; Every 4 hours  Continuous        Comments: Increase by 10ml every 3-4 hours as tolerated to goal rate   Question Answer Comment   Formula Rate (mL/hr): 75    Feeding Volume (mL): 1500    Select Formula: Fibersource HN    Route: Peg    Free water flush volume (mL): 100    Free water flush frequency: Every 4 hours        05/10/23 1332    05/09/23 1217  Diet NPO  Diet effective now         05/09/23 1217                   Medications Per DC med rec  Activities as tolerated   Follow-up Information       Shahzad King MD Follow up on 5/17/2023.    Specialties: Neurology, Interventional Neurology  Why: follow up with Dr King on May 17 at 11am.  Contact information:  [address] [place] [address] Drive  [address] [place] [address]  [id number]  [phone]               TERESSA Andrews MD. Schedule an appointment as soon as possible for a visit in 2 week(s).    Specialty: Family Medicine  Why: post discharge  Contact information:  [place] [address]  [phone]                           For further questions contact hospitalist office    Discharge time 34 minutes    For worsening symptoms, chest pain, shortness of breath, increased abdominal pain, high grade fever, stroke or stroke like symptoms, immediately go to the nearest Emergency Room or call 911 as soon as possible.      Kobe Antoine MD  Department of Hospital Medicine   [facility]   05/15/2023 8:40 AM

## 2023-05-15 NOTE — PLAN OF CARE
05/15/23 0913   Final Note   Assessment Type Final Discharge Note   Anticipated Discharge Disposition SNF   Hospital Resources/Appts/Education Provided Post-Acute resouces added to AVS   Post-Acute Status   Post-Acute Authorization Placement   Post-Acute Placement Status Set-up Complete/Auth obtained  ([facility])     Updates provided to patient's sister Tisha regarding DC plan for today. [facility] will be reaching out to Tisha regarding paperwork.

## 2023-05-15 NOTE — PROGRESS NOTES
Inpatient Nutrition Assessment    Admit Date: 4/26/2023   Total duration of encounter: 19 days     Nutrition Recommendation/Prescription     TF recommendations: Fibersource HN  @ 75 mL/hr with 100mL free water q4hrs.   Kcal: 1800 kcal/day (~99% est kcal needs)  Protein: 81 g/day (~90% est protein needs)  Fluid: 1815 mL (~100% est fluid needs)    2. Monitor tolerance, weight, and labs      Communication of Recommendations: reviewed with nurse and reviewed with patient    Nutrition Assessment     Malnutrition Assessment/Nutrition-Focused Physical Exam    Malnutrition in the context of acute illness or injury  Degree of Malnutrition: unable to complete  Energy Intake: unable to obtain  Interpretation of Weight Loss: >7.5% in 3 months (per EMR)  Body Fat:does not meet criteria  Area of Body Fat Loss: does not meet criteria  Muscle Mass Loss: does not meet criteria  Area of Muscle Mass Loss: does not meet criteria  Fluid Accumulation: unable to obtain  Edema: unable to obtain   Reduced  Strength: unable to obtain  A minimum of two characteristics is recommended for diagnosis of either severe or non-severe malnutrition.    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  History of CVA to the right MCA with residual left-sided upper and lower extremity weakness and left-sided facial droop  Hypertension    Relevant Medical History:    Acid reflux      Cerebral aneurysm      Heart disease      Hypertension      Mixed hyperlipidemia        Nutrition-Related Medications: miralax  Calorie Containing IV Medications: Clinimix     Nutrition-Related Labs:  4/29/2023: Total Bili: 1.6 and Glucose 93  4/30: Crea-0.71  5/4/2023: Crea 0.72, Alb 3.1, Gluc 126  5/10: Na 135, Glu 117  5/13: Crea  "0.68    Diet/PN Order: Diet NPO  Diet NPO  Oral Supplement Order: none  Tube Feeding Order:  Fibersource HN @ 75 mL/hr (see below for calculation)   Appetite/Oral Intake: NPO/NPO  Factors Affecting Nutritional Intake: NPO  Food/Sikhism/Cultural Preferences: unable to obtain  Food Allergies: unable to obtain    Skin Integrity: drain/device(s)  Wound(s):   none noted.    Comments    2023: Pt unable to answer questions. Per EMR, pt weighed 83.9 kg on 2023 (11% wt loss in 3 months, significant). Provided TF recommendations of Peptamen @ 75 mL/hr. Pt currently has Peptamen @ 10 mL/hr running, no issues noted thus far per nurse. Will monitor.  23: Pt out of ICU now. Continues on tube feeds. Will change Tube feed formula and adjust nutritional needs accordingly.   2023: Pt expecting PEG placement either  or . TF on hold at this time due to accidental NGT removal per pt. Clinimix now ordered, was not hanging at time of visit. Pt denies N/V. Last BM documented as 2023. Will monitor.  5/10:  pt had PEG placed yesterday; plan to continue with current formula; PPN running; nurse reports good tolerance  5/15 tolerating tube feeding; plans to d/c today    Anthropometrics    Height: 5' 7.99" (172.7 cm) Height Method: Stated  Last Weight: 74.4 kg (164 lb) (05/10/23 0713) Weight Method: Bed Scale  BMI (Calculated): 24.9  BMI Classification: overweight (BMI 25-29.9)        Ideal Body Weight (IBW), Male: 153.94 lb     % Ideal Body Weight, Male (lb): 106.54 %                 Usual Body Weight (UBW), k.9 kg  % Usual Body Weight: 89.34     Usual Weight Provided By: EMR weight history    Wt Readings from Last 5 Encounters:   05/10/23 74.4 kg (164 lb)   23 77 kg (169 lb 12.1 oz)   23 83.9 kg (185 lb)   23 83.9 kg (185 lb)     Weight Change(s) Since Admission:  Admit Weight: 74.8 kg (164 lb 14.5 oz) (23 1026)  2023: 74.8 kg  : no new wt  2023: no new wts logged  5/10: " 74.4kg    Estimated Needs    Weight Used For Calorie Calculations: 74.8 kg (164 lb 14.5 oz)  Energy Calorie Requirements (kcal): 4089-1978 (MSJ x 1.2-1.3)  Energy Need Method: Ramsey-St Jeor  Weight Used For Protein Calculations: 74.8 kg (164 lb 14.5 oz)  Protein Requirements:  (1.2-2.0 g/kg)  Fluid Requirements (mL): 1816 (1 mL/kcal)           Enteral Nutrition    Evaluation of Received Nutrient/Fluid Intake     Formula: Fibersource HN  Rate/Volume: 75ml/hr  Water Flushes: 100ml every 4h  Additives/Modulars: none at this time  Route: PEG tube  Method: continuous  Total Nutrition Provided by Tube Feeding, Additives, and Flushes:  Calories Provided  1800 kcal/d, 99% needs   Protein Provided  81 g/d, 90% needs   Fluid Provided  1815 ml/d, 100% needs   Continuous feeding calculations based on estimated 20 hr/d run time unless otherwise stated.     Parenteral Nutrition    Pt not receiving parenteral nutrition at this time      Evaluation of Received Nutrient Intake    Calories: meeting estimated needs  Protein: meeting estimated needs    Patient Education    Not applicable.    Nutrition Diagnosis     PES: Increased nutrient needs related to acute illness as evidenced by increased need for kcal and protein. (continues)    Interventions/Goals     Intervention(s): modified composition of enteral nutrition  Goal: Tolerate enteral feeding at goal rate by follow-up. (goal met)    Monitoring & Evaluation     Dietitian will monitor enteral nutrition intake, weight, electrolyte/renal panel, glucose/endocrine profile, and gastrointestinal profile.  Nutrition Risk/Follow-Up: moderate (follow-up in 3-5 days)   Please consult if re-assessment needed sooner.

## 2023-06-13 ENCOUNTER — PATIENT MESSAGE (OUTPATIENT)
Dept: NEUROLOGY | Facility: CLINIC | Age: 63
End: 2023-06-13
Payer: MEDICAID

## 2023-08-14 PROBLEM — I63.511 ACUTE ISCHEMIC RIGHT MCA STROKE: Status: RESOLVED | Noted: 2020-11-27 | Resolved: 2023-08-14

## (undated) DEVICE — SOL IRRI STRL WATER 1000ML

## (undated) DEVICE — KIT PEG PULL SAFETY 24FR

## (undated) DEVICE — TUBING O2 FEMALE CONN 13FT

## (undated) DEVICE — TIP SUCTION YANKAUER

## (undated) DEVICE — KIT CANIST SUCTION 1200CC

## (undated) DEVICE — COLLECTION SPECIMEN NEPTUNE

## (undated) DEVICE — KIT SURGICAL COLON .25 1.1OZ